# Patient Record
Sex: FEMALE | Race: WHITE | NOT HISPANIC OR LATINO | Employment: FULL TIME | URBAN - METROPOLITAN AREA
[De-identification: names, ages, dates, MRNs, and addresses within clinical notes are randomized per-mention and may not be internally consistent; named-entity substitution may affect disease eponyms.]

---

## 2020-08-07 ENCOUNTER — OFFICE VISIT CONVERTED (OUTPATIENT)
Dept: FAMILY MEDICINE CLINIC | Facility: CLINIC | Age: 40
End: 2020-08-07
Attending: NURSE PRACTITIONER

## 2020-08-07 ENCOUNTER — CONVERSION ENCOUNTER (OUTPATIENT)
Dept: FAMILY MEDICINE CLINIC | Facility: CLINIC | Age: 40
End: 2020-08-07

## 2020-12-02 ENCOUNTER — HOSPITAL ENCOUNTER (OUTPATIENT)
Dept: LAB | Facility: HOSPITAL | Age: 40
Discharge: HOME | End: 2020-12-02
Attending: NURSE PRACTITIONER

## 2020-12-02 LAB
25(OH)D3 SERPL-MCNC: 33.2 NG/ML (ref 30–100)
ALBUMIN SERPL-MCNC: 4.5 G/DL (ref 3.5–5)
ALBUMIN/GLOB SERPL: 1.7 {RATIO} (ref 1.4–2.6)
ALP SERPL-CCNC: 42 U/L (ref 42–98)
ALT SERPL-CCNC: 20 U/L (ref 10–40)
ANION GAP SERPL CALC-SCNC: 19 MMOL/L (ref 8–19)
AST SERPL-CCNC: 16 U/L (ref 15–50)
BASOPHILS # BLD AUTO: 0.02 10*3/UL (ref 0–0.2)
BASOPHILS NFR BLD AUTO: 0.4 % (ref 0–3)
BILIRUB SERPL-MCNC: 0.17 MG/DL (ref 0.2–1.3)
BUN SERPL-MCNC: 19 MG/DL (ref 5–25)
BUN/CREAT SERPL: 23 {RATIO} (ref 6–20)
CALCIUM SERPL-MCNC: 9.7 MG/DL (ref 8.7–10.4)
CHLORIDE SERPL-SCNC: 108 MMOL/L (ref 99–111)
CHOLEST SERPL-MCNC: 235 MG/DL (ref 107–200)
CHOLEST/HDLC SERPL: 3.4 {RATIO} (ref 3–6)
CONV ABS IMM GRAN: 0.01 10*3/UL (ref 0–0.2)
CONV CO2: 20 MMOL/L (ref 22–32)
CONV IMMATURE GRAN: 0.2 % (ref 0–1.8)
CONV TOTAL PROTEIN: 7.2 G/DL (ref 6.3–8.2)
CREAT UR-MCNC: 0.83 MG/DL (ref 0.5–0.9)
DEPRECATED RDW RBC AUTO: 42.5 FL (ref 36.4–46.3)
EOSINOPHIL # BLD AUTO: 0.16 10*3/UL (ref 0–0.7)
EOSINOPHIL # BLD AUTO: 3.3 % (ref 0–7)
ERYTHROCYTE [DISTWIDTH] IN BLOOD BY AUTOMATED COUNT: 12.6 % (ref 11.7–14.4)
EST. AVERAGE GLUCOSE BLD GHB EST-MCNC: 103 MG/DL
GFR SERPLBLD BASED ON 1.73 SQ M-ARVRAT: >60 ML/MIN/{1.73_M2}
GLOBULIN UR ELPH-MCNC: 2.7 G/DL (ref 2–3.5)
GLUCOSE SERPL-MCNC: 94 MG/DL (ref 65–99)
HBA1C MFR BLD: 5.2 % (ref 3.5–5.7)
HCT VFR BLD AUTO: 42.9 % (ref 37–47)
HDLC SERPL-MCNC: 70 MG/DL (ref 40–60)
HGB BLD-MCNC: 14.1 G/DL (ref 12–16)
LDLC SERPL CALC-MCNC: 153 MG/DL (ref 70–100)
LYMPHOCYTES # BLD AUTO: 1.71 10*3/UL (ref 1–5)
LYMPHOCYTES NFR BLD AUTO: 35.6 % (ref 20–45)
MCH RBC QN AUTO: 30.1 PG (ref 27–31)
MCHC RBC AUTO-ENTMCNC: 32.9 G/DL (ref 33–37)
MCV RBC AUTO: 91.7 FL (ref 81–99)
MONOCYTES # BLD AUTO: 0.31 10*3/UL (ref 0.2–1.2)
MONOCYTES NFR BLD AUTO: 6.4 % (ref 3–10)
NEUTROPHILS # BLD AUTO: 2.6 10*3/UL (ref 2–8)
NEUTROPHILS NFR BLD AUTO: 54.1 % (ref 30–85)
NRBC CBCN: 0 % (ref 0–0.7)
OSMOLALITY SERPL CALC.SUM OF ELEC: 298 MOSM/KG (ref 273–304)
PLATELET # BLD AUTO: 188 10*3/UL (ref 130–400)
PMV BLD AUTO: 11.4 FL (ref 9.4–12.3)
POTASSIUM SERPL-SCNC: 3.9 MMOL/L (ref 3.5–5.3)
RBC # BLD AUTO: 4.68 10*6/UL (ref 4.2–5.4)
SODIUM SERPL-SCNC: 143 MMOL/L (ref 135–147)
TRIGL SERPL-MCNC: 59 MG/DL (ref 40–150)
TSH SERPL-ACNC: 2.75 M[IU]/L (ref 0.27–4.2)
VIT B12 SERPL-MCNC: 868 PG/ML (ref 211–911)
VLDLC SERPL-MCNC: 12 MG/DL (ref 5–37)
WBC # BLD AUTO: 4.81 10*3/UL (ref 4.8–10.8)

## 2020-12-04 ENCOUNTER — CONVERSION ENCOUNTER (OUTPATIENT)
Dept: FAMILY MEDICINE CLINIC | Facility: CLINIC | Age: 40
End: 2020-12-04

## 2020-12-04 ENCOUNTER — OFFICE VISIT CONVERTED (OUTPATIENT)
Dept: FAMILY MEDICINE CLINIC | Facility: CLINIC | Age: 40
End: 2020-12-04
Attending: NURSE PRACTITIONER

## 2021-01-07 ENCOUNTER — OFFICE VISIT CONVERTED (OUTPATIENT)
Dept: FAMILY MEDICINE CLINIC | Facility: CLINIC | Age: 41
End: 2021-01-07
Attending: NURSE PRACTITIONER

## 2021-04-01 ENCOUNTER — HOSPITAL ENCOUNTER (OUTPATIENT)
Dept: GENERAL RADIOLOGY | Facility: HOSPITAL | Age: 41
Discharge: HOME | End: 2021-04-01
Attending: NURSE PRACTITIONER

## 2021-04-01 ENCOUNTER — CONVERSION ENCOUNTER (OUTPATIENT)
Dept: FAMILY MEDICINE CLINIC | Facility: CLINIC | Age: 41
End: 2021-04-01

## 2021-04-01 ENCOUNTER — OFFICE VISIT CONVERTED (OUTPATIENT)
Dept: FAMILY MEDICINE CLINIC | Facility: CLINIC | Age: 41
End: 2021-04-01
Attending: NURSE PRACTITIONER

## 2021-05-07 ENCOUNTER — CONVERSION ENCOUNTER (OUTPATIENT)
Dept: FAMILY MEDICINE CLINIC | Facility: CLINIC | Age: 41
End: 2021-05-07

## 2021-05-07 ENCOUNTER — OFFICE VISIT CONVERTED (OUTPATIENT)
Dept: FAMILY MEDICINE CLINIC | Facility: CLINIC | Age: 41
End: 2021-05-07
Attending: NURSE PRACTITIONER

## 2021-05-13 NOTE — PROGRESS NOTES
Progress Note      Patient Name: Angelique Cordon   Patient ID: [id number]   Sex: Female   YOB: 1980    Primary Care Provider: Maira FORBES   Referring Provider: Maira FORBES    Visit Date: August 7, 2020    Provider: ANDREI Evangelista   Location: [facility]   Location Address: [address] [place] [address], [address]  [place]  [id number]   Location Phone: [phone]          Chief Complaint  · NEW PATIENT ESTABLISH CARE      History Of Present Illness  Angelique Cordon is a 40 year old female who presents for evaluation and treatment of:      Patient is here today to establish care.    Says she is here to establish care and discuss her weight gain. Gained 32 lbs since October and seems to be still climbing up. She just restarted [employer]. She is normally strict Keto but recently moved the last couple weeks so she has been off it. She states her body feels better on Keto.    Would like to schedule Pap smear with us and Mammogram. Both have always been normal in the pastl    costochondritis-dx 2018-she takes motrin prn which takes care of it.    due tetanus-defers today.       Past Surgical History  Procedure Name Date Notes   Foot surgery 2004/2008 L FOOT         Medication List  Name Date Started Instructions   Motrin  mg oral tablet 08/09/2020 take 1 tablet (200 mg) by oral route every 4-6 hours as needed with food   Trintellix 10 mg oral tablet 08/10/2020 take 1 tablet (10 mg) by oral route once daily at the same time each day         Allergy List  Allergen Name Date Reaction Notes   NO KNOWN DRUG ALLERGIES --  --  --        Allergies Reconciled  Family Medical History  Disease Name Relative/Age Notes   Diabetes Mother/   --          Social History  Finding Status Start/Stop Quantity Notes   Alcohol Never --/-- --  08/07/2020 -     --  --/-- --  --    Tobacco Never --/-- --  --          Review of Systems  · Constitutional  o Admits  o : wt gain  o Denies  o :  fatigue, night sweats  · Eyes  o Denies  o : double vision, blurred vision  · HENT  o Denies  o : vertigo, recent head injury  · Breasts  o Denies  o : abnormal changes in breast size, additional breast symptoms except as noted in the HPI  · Cardiovascular  o Denies  o : chest pain, irregular heart beats  · Respiratory  o Denies  o : shortness of breath, productive cough  · Gastrointestinal  o Denies  o : nausea, vomiting  · Genitourinary  o Denies  o : dysuria, urinary retention  · Integument  o Denies  o : hair growth change, new skin lesions  · Neurologic  o Denies  o : altered mental status, seizures  · Musculoskeletal  o Admits  o : chest wall pain at times  o Denies  o : joint swelling, limitation of motion  · Endocrine  o Denies  o : cold intolerance, heat intolerance  · Heme-Lymph  o Denies  o : petechiae, lymph node enlargement or tenderness  · Allergic-Immunologic  o Denies  o : frequent illnesses      Vitals  Date Time BP Position Site L\R Cuff Size HR RR TEMP (F) WT  HT  BMI kg/m2 BSA m2 O2 Sat        08/07/2020 01:38 /65 Sitting    88 - R   168lbs 0oz 5'   32.81 1.8 97 %          Physical Examination  · Constitutional  o Appearance  o : alert, in no acute distress, well developed, well-nourished  · Neck  o Thyroid  o : no thyomegaly, no palpabale masses   · Respiratory  o Auscultation of Lungs  o : normal breath sounds throughout, no wheeze, rhonchi, or crackles  · Cardiovascular  o Heart  o : Regular rate and rhythm, Normal S1,S2 , No cardiac murmers, No S3 or S4 gallop or rubs  · Skin and Subcutaneous Tissue  o General Inspection  o : no rashes, normal skin color, warm and dry  o Digits and Nails  o : no clubbing, cyanosis, deformities or edema present, normal appearing nails  · Neurologic  o Mental Status Examination  o : alert and oriented to time, place, and person. Gait and Station: normal gait, able to stand without difficulty  · Psychiatric  o Judgement and Insight  o : judgment and  insight intact  o Mood and Affect  o : normal mood and affect          Assessment  · Fatigue     780.79/R53.83  · Vitamin D deficiency     268.9/E55.9  · Screening for depression     V79.0/Z13.89  scored 4 on the PHQ-9 today-neg for depression.  · Screening for lipid disorders     V77.91/Z13.220  · Visit for screening mammogram     V76.12/Z12.31  ordered today.  · Screening for thyroid disorder     V77.0/Z13.29  · Routine lab draw     V72.60/Z01.89  · Family history of diabetes mellitus     V18.0/Z83.3  · Costochondral chest pain     786.59/R07.89  occ costochondritis-reports motrin relieves the pain.  · Weight gain     783.1/R63.5  32 lb wt gain since October. She just restarted [employer]. She is trying to get back on her Keto diet. She has been on Zoloft-discussed side effect of wt gain. Switched to Trintellix 10mg qd. Scheduled a 1 month f/u.      Plan  · Orders  o ACO-18: Negative screen for clinical depression using a standardized tool () - V79.0/Z13.89 - 08/07/2020  o Screening Mammography; Bilateral 3D (30884, 94470, ) - V76.12/Z12.31 - 08/07/2020  o Hgb A1c [facility] (34763) - V18.0/Z83.3 - 08/07/2020  o Physical, Primary Care Panel (CBC, CMP, Lipid, TSH) [facility] (72156, 36614, 43620, 83921) - V77.91/Z13.220, V77.0/Z13.29, V72.60/Z01.89 - 08/07/2020  o Vitamin D (25-Hydroxy) Level (25159) - 268.9/E55.9, 780.79/R53.83 - 08/07/2020  o ACO-39: Current medications updated and reviewed () - - 08/07/2020  o ACO-14: Influenza immunization was not administered for reasons documented () - - 08/07/2020  o Vitamin B12 level (85091) - 780.79/R53.83 - 08/07/2020  · Medications  o Trintellix 10 mg oral tablet   SIG: take 1 tablet (10 mg) by oral route once daily at the same time each day   DISP: (30) tablets with 0 refills  Prescribed on 08/07/2020     o Zoloft 50 mg oral tablet   SIG: take 1 tablet (50 mg) by oral route once daily   DISP: (0) tablet with 0 refills  Discontinued on 08/07/2020      o Medications have been Reconciled  o Transition of Care or Provider Policy  · Instructions  o Depression Screen completed and scanned into the EMR under the designated folder within the patient's documents.  o Today's PHQ-9 result is 4.  o Take all medications as prescribed/directed.  o Patient was educated/instructed on their diagnosis, treatment and medications prior to discharge from the clinic today.  o Patient instructed to seek medical attention urgently for new or worsening symptoms.  o Call the office with any concerns or questions.  o 1 month follow up  · Disposition  o Call or Return if symptoms worsen or persist.            Electronically Signed by: ANDREI Evangelista -Author on August 13, 2020 11:20:21 PM

## 2021-05-13 NOTE — PROGRESS NOTES
Progress Note      Patient Name: Angelique Cordon   Patient ID: [id number]   Sex: Female   YOB: 1980    Primary Care Provider: Maira FORBES   Referring Provider: Maira FORBES    Visit Date: December 4, 2020    Provider: ANDREI Evangelista   [place] [facility]   Location Address: [address] [place] [address], [address]  [place], [place]  [id number]   Location Phone: [phone]          Chief Complaint  · DISCUSS MEDICATIONS      History Of Present Illness  Angelique Cordon is a 40 year old /White female who presents for evaluation and treatment of:      Patient is here today to discuss medications. Says Trintellix is working for her she does feel a little agitated at night time. Increased dose to 20mg qd. Scheduled 1 month follow up.    States she would like to discuss weight loss options. Says she is on dirty keto diet and exercises 3-4 times a week but don't seem to lose any weight. Discussed Saxenda, Contrave and Phentermine. She is not a candidate for Contrave or Phentermine due to interaction w/Trintellix. Prescribed Saxenda. She is to bring the medication back for teaching prior to starting the medication.    report whelp under the left axilla -reports initially she noticed it in the summer but it went away-states it came back in the same spot. She was told it was a bug bite when it initially occurred. The area is red but no discharge or warmth noted. She is to call if the area does not resolve or she notices more lesions.       Past Medical History  Disease Name Date Onset Notes   Pap smear for cervical cancer screening 2018 --    Screening Mammogram --  --          Past Surgical History  Procedure Name Date Notes   Foot surgery 2004/2008 L FOOT         Medication List  Name Date Started Instructions   ibuprofen 800 mg oral tablet 12/04/2020 take 1 tablet (800 mg) by oral route 3 times per day with food prn   Trintellix 20 mg oral tablet 12/04/2020 take 1 tablet  (20 mg) by oral route once daily at the same time each day         Allergy List  Allergen Name Date Reaction Notes   NO KNOWN DRUG ALLERGIES --  --  --        Allergies Reconciled  Family Medical History  Disease Name Relative/Age Notes   Diabetes Mother/   --          Social History  Finding Status Start/Stop Quantity Notes   Alcohol Never --/-- --  12/04/2020 - 08/07/2020 -     --  --/-- --  --    Tobacco Never --/-- --  --          Review of Systems  · Constitutional  o Admits  o : wt gain  o Denies  o : fever, fatigue, weight loss  · Cardiovascular  o Denies  o : lower extremity edema, claudication, chest pressure, palpitations  · Respiratory  o Denies  o : shortness of breath, wheezing, cough, hemoptysis, dyspnea on exertion  · Gastrointestinal  o Denies  o : nausea, vomiting, diarrhea, constipation, abdominal pain  · Integument  o Admits  o : pigmentation changes, new skin lesions  o Denies  o : rash, itching  · Psychiatric  o Admits  o : anxiety, depression, agitation      Vitals  Date Time BP Position Site L\R Cuff Size HR RR TEMP (F) WT  HT  BMI kg/m2 BSA m2 O2 Sat FR L/min FiO2 HC       08/07/2020 01:38 /65 Sitting    88 - R   168lbs 0oz 5'   32.81 1.8 97 %      12/04/2020 08:11 /77 Sitting    87 - R   175lbs 4oz 5'   34.23 1.83 100 %            Physical Examination  · Constitutional  o Appearance  o : alert, in no acute distress, well developed, well-nourished  · Neck  o Thyroid  o : no thyomegaly, no palpabale masses   · Respiratory  o Auscultation of Lungs  o : normal breath sounds throughout, no wheeze, rhonchi, or crackles  · Cardiovascular  o Heart  o : Regular rate and rhythm, Normal S1,S2 , No cardiac murmers, No S3 or S4 gallop or rubs  · Skin and Subcutaneous Tissue  o General Inspection  o : no rashes, normal skin color, warm and dry  o Digits and Nails  o : no clubbing, cyanosis, deformities or edema present, normal appearing nails  · Neurologic  o Mental Status  Examination  o : alert and oriented to time, place, and person. Gait and Station: normal gait, able to stand without difficulty  · Psychiatric  o Judgement and Insight  o : judgment and insight intact  o Mood and Affect  o : normal mood and affect          Assessment  · Anxiety disorder     300.00/F41.9  · Depression     311/F32.9  · Obesity     278.00/E66.9  · Weight gain     783.1/R63.5  · Skin lesion     709.9/L98.9  · BMI 34.0-34.9,adult     V85.34/Z68.34      Plan  · Orders  o ACO-39: Current medications updated and reviewed (, 1159F) - - 12/04/2020  o ACO-14: Influenza immunization was not administered for reasons documented [facility] () - - 12/04/2020  · Medications  o Saxenda 3 mg/0.5 mL (18 mg/3 mL) subcutaneous pen injector   SIG: inject 0.6 mg subq daily x1wk then increase to 1.2mg qd x 1wk then 1.8mg qd x 1 wk then 2.4mg qd x 1wk then 3mg qd.   DISP: (1) Pen Needle with 0 refills  Prescribed on 12/04/2020     o ibuprofen 800 mg oral tablet   SIG: take 1 tablet (800 mg) by oral route 3 times per day with food prn   DISP: (20) Tablet with 2 refills  Adjusted on 12/04/2020     o Trintellix 20 mg oral tablet   SIG: take 1 tablet (20 mg) by oral route once daily at the same time each day   DISP: (30) Tablet with 0 refills  Refilled on 12/04/2020     o Medications have been Reconciled  o Transition of Care or Provider Policy  · Instructions  o Take all medications as prescribed/directed.  o Patient was educated/instructed on their diagnosis, treatment and medications prior to discharge from the clinic today.  o Patient instructed to seek medical attention urgently for new or worsening symptoms.  o Call the office with any concerns or questions.  o 1 month follow up  o Electronically Identified Patient Education Materials Provided Electronically  · Disposition  o Call or Return if symptoms worsen or persist.            Electronically Signed by: ANDREI Evangelista -Author on December 4, 2020 09:48:23 AM

## 2021-05-14 VITALS
SYSTOLIC BLOOD PRESSURE: 133 MMHG | HEART RATE: 87 BPM | OXYGEN SATURATION: 100 % | DIASTOLIC BLOOD PRESSURE: 77 MMHG | HEIGHT: 60 IN | BODY MASS INDEX: 34.41 KG/M2 | WEIGHT: 175.25 LBS

## 2021-05-14 VITALS
HEIGHT: 60 IN | SYSTOLIC BLOOD PRESSURE: 121 MMHG | HEART RATE: 78 BPM | DIASTOLIC BLOOD PRESSURE: 74 MMHG | WEIGHT: 177 LBS | OXYGEN SATURATION: 98 % | BODY MASS INDEX: 34.75 KG/M2

## 2021-05-14 VITALS
HEIGHT: 60 IN | WEIGHT: 178.25 LBS | SYSTOLIC BLOOD PRESSURE: 124 MMHG | BODY MASS INDEX: 34.99 KG/M2 | DIASTOLIC BLOOD PRESSURE: 79 MMHG | OXYGEN SATURATION: 97 % | SYSTOLIC BLOOD PRESSURE: 148 MMHG | HEART RATE: 84 BPM | DIASTOLIC BLOOD PRESSURE: 70 MMHG

## 2021-05-14 NOTE — PROGRESS NOTES
Progress Note      Patient Name: Angelique Cordon   Patient ID: [id number]   Sex: Female   YOB: 1980    Primary Care Provider: Maira FORBES   Referring Provider: Maira FORBES    Visit Date: April 1, 2021    Provider: ANDREI Evangelista   Location: [facility] [place]   Location Address: [address] [place] [address], [address]  [place], [place]  [id number]   Location Phone: [phone]          Chief Complaint  · Acute Visit   · Right Foot pain   · Cramping in toes      History Of Present Illness  Angelique Cordon is a 41 year old /White female who presents for evaluation and treatment of:      Pt is here for Acute Visit.    Pt states that this pain has been going on for a month now. Pt thinks that she injured it in [place] at the [employer]. States she did 2 races the first was a 7 mile and the second was a 4 mile.     Pt states that she has pain on the bottom of her right forefoot that radiates to her toes with sharp tightening pain. Pain isolated in the second and third toes. States she is unable to go barefoot due to pain. Denies swelling or bruising. Pt has used IBU 800mg QD for pain. States it helps the pain. She has not tried ice. Discussed her symptoms correlate with metatarsalgia. Ordered right foot x-ray to r/o stress fx.      She is doing CrossFit 5 days per wk and meal planning to loose wt-she was started on phentermine in Jan but missed her last appt due to having to pick her daughter up from school. Denies any side effects of the phentermine-increased dose to 18.75mg bid. Tani and JUANJO up to date.       Past Medical History  Disease Name Date Onset Notes   Bone Density Screening --  --    Pap smear for cervical cancer screening 2018 --    Screening Mammogram --  --          Past Surgical History  Procedure Name Date Notes   Colonoscopy --  --    Foot surgery 2004/2008 L FOOT         Medication List  Name Date Started Instructions   ibuprofen 800 mg oral tablet  02/18/2021 take 1 tablet (800 mg) by oral route 3 times per day with food prn   phentermine 37.5 mg oral tablet 04/01/2021 take one-half tablet (18.75 mg) by oral route 2 times per day before breakfast and after lunch   Trintellix 20 mg oral tablet 02/18/2021 take 1 tablet (20 mg) by oral route once daily at the same time each day         Allergy List  Allergen Name Date Reaction Notes   NO KNOWN DRUG ALLERGIES --  --  --          Family Medical History  Disease Name Relative/Age Notes   Diabetes Mother/   --          Social History  Finding Status Start/Stop Quantity Notes   Alcohol Never --/-- --  01/07/2021 - 12/04/2020 - 08/07/2020 -     --  --/-- --  --    Tobacco Never --/-- --  --          Review of Systems  · Constitutional  o Denies  o : fever, fatigue, weight loss, weight gain  · Cardiovascular  o Denies  o : lower extremity edema, claudication, chest pressure, palpitations  · Respiratory  o Denies  o : shortness of breath, wheezing, cough, hemoptysis, dyspnea on exertion  · Gastrointestinal  o Denies  o : nausea, vomiting, diarrhea, constipation, abdominal pain  · Musculoskeletal  o Admits  o : foot pain      Vitals  Date Time BP Position Site L\R Cuff Size HR RR TEMP (F) WT  HT  BMI kg/m2 BSA m2 O2 Sat FR L/min FiO2 HC       04/01/2021 02:55 /79 Sitting    84 - R   178lbs 4oz 5'   34.81 1.85 97 %  21%    04/01/2021 03:12 /70 Sitting                       Physical Examination  · Constitutional  o Appearance  o : alert, in no acute distress, well developed, well-nourished  · Respiratory  o Auscultation of Lungs  o : normal breath sounds throughout, no wheeze, rhonchi, or crackles  · Cardiovascular  o Heart  o : Regular rate and rhythm, Normal S1,S2 , No cardiac murmers, No S3 or S4 gallop or rubs  · Skin and Subcutaneous Tissue  o General Inspection  o : no rashes, normal skin color, warm and dry  o Digits and Nails  o : no clubbing, cyanosis, deformities or edema present, normal  appearing nails  · Neurologic  o Mental Status Examination  o : alert and oriented to time, place, and person. Gait and Station: normal gait, able to stand without difficulty  · Psychiatric  o Judgment and Insight  o : judgment and insight intact  o Mood and Affect  o : normal mood and affect     TTP over right forefoot-specifically below the 2nd and 3rd digits           Assessment  · Foot pain, right     729.5/M79.671  · Metatarsalgia of right foot     726.70/M77.41      Plan  · Orders  o ACO - Pt declines to or was not able to provide an Advance Care Plan or name a Surrogate Decision Maker (1124F) - - 04/01/2021  o ACO-39: Current medications updated and reviewed (1159F, ) - - 04/01/2021  o Xray foot right [facility] ([id number]) - 729.5/M79.671 - 04/01/2021  · Medications  o phentermine 37.5 mg oral tablet   SIG: take one-half tablet (18.75 mg) by oral route 2 times per day before breakfast and after lunch   DISP: (30) Tablet with 0 refills  Adjusted on 04/01/2021     o Medications have been Reconciled  o Transition of Care or Provider Policy  · Instructions  o Patient was educated/instructed on their diagnosis, treatment and medications prior to discharge from the clinic today.  o Patient instructed to seek medical attention urgently for new or worsening symptoms.  o Call the office with any concerns or questions.  o Bring all medicines with their bottles to each office visit.  o 1 month follow up  · Disposition  o Call or Return if symptoms worsen or persist.            Electronically Signed by: ANDREI Evangelista -Author on April 1, 2021 09:20:04 PM

## 2021-05-14 NOTE — PROGRESS NOTES
Progress Note      Patient Name: Angelique Cordon   Patient ID: [id number]   Sex: Female   YOB: 1980    Primary Care Provider: Maira FORBES   Referring Provider: Maira FORBES    Visit Date: January 7, 2021    Provider: ANDREI Evangelista   Location: [facility]   Location Address: [address] [place] [address], [address]  [place], [place]  [id number]   Location Phone: [phone]          Chief Complaint  · 1 MONTH F/U      History Of Present Illness  Angelique Cordon is a 40 year old /White female who presents for evaluation and treatment of:      Patient is here today for 1 month follow up on Saxenda. States her insurance wont pay for it unless she try phentermine and contrave medications. States she doing really good on Trintellix 20mg-she feels her mood is great. She is doing Keto diet and she has an appt today to start CrossFit. There is a possible interaction with Trintellix w/Saxenda and Contrave so instructed pt to move her Trintellix to pm and take the Phentermine in the am to avoid a medication interaction. UDS, Tani and Controlled Consent obtained today. Prescribed Phentermine 15mg po qd. Scheduled 1m follow up.       Past Medical History  Disease Name Date Onset Notes   Pap smear for cervical cancer screening 2018 --    Screening Mammogram --  --          Past Surgical History  Procedure Name Date Notes   Foot surgery 2004/2008 L FOOT         Medication List  Name Date Started Instructions   ibuprofen 800 mg oral tablet 12/04/2020 take 1 tablet (800 mg) by oral route 3 times per day with food prn   Trintellix 20 mg oral tablet 01/07/2021 take 1 tablet (20 mg) by oral route once daily at the same time each day         Allergy List  Allergen Name Date Reaction Notes   NO KNOWN DRUG ALLERGIES --  --  --        Allergies Reconciled  Family Medical History  Disease Name Relative/Age Notes   Diabetes Mother/   --          Social History  Finding Status Start/Stop  Quantity Notes   Alcohol Never --/-- --  01/07/2021 - 12/04/2020 - 08/07/2020 -     --  --/-- --  --    Tobacco Never --/-- --  --          Review of Systems  · Constitutional  o Admits  o : wt gain  o Denies  o : fever, fatigue, weight loss  · Cardiovascular  o Denies  o : lower extremity edema, claudication, chest pressure, palpitations  · Respiratory  o Denies  o : shortness of breath, wheezing, cough, hemoptysis, dyspnea on exertion  · Gastrointestinal  o Denies  o : nausea, vomiting, diarrhea, constipation, abdominal pain      Vitals  Date Time BP Position Site L\R Cuff Size HR RR TEMP (F) WT  HT  BMI kg/m2 BSA m2 O2 Sat FR L/min FiO2 HC       01/07/2021 08:21 /74 Sitting    78 - R   176lbs 16oz 5'   34.57 1.84 98 %            Physical Examination  · Constitutional  o Appearance  o : alert, in no acute distress, well developed, well-nourished  · Respiratory  o Auscultation of Lungs  o : normal breath sounds throughout, no wheeze, rhonchi, or crackles  · Cardiovascular  o Heart  o : Regular rate and rhythm, Normal S1,S2 , No cardiac murmers, No S3 or S4 gallop or rubs  · Skin and Subcutaneous Tissue  o General Inspection  o : no rashes, normal skin color, warm and dry  o Digits and Nails  o : no clubbing, cyanosis, deformities or edema present, normal appearing nails  · Neurologic  o Mental Status Examination  o : alert and oriented to time, place, and person. Gait and Station: normal gait, able to stand without difficulty  · Psychiatric  o Judgement and Insight  o : judgment and insight intact  o Mood and Affect  o : normal mood and affect          Results  · In-Office Procedures  o Lab procedure  § IOP - Urine Drug Screen In-House [facility] ([address])   § Amphetamines Ur Ql: Negative   § Barbiturates Ur Ql: Negative   § Buprenorphine+Nor Ur Ql Scn: Negative   § Benzodiaz Ur Ql: Negative   § Cocaine Ur Ql: Negative   § Methadone Ur Ql: Negative   § Methamphet Ur Ql: Negative   § MDMA Ur Ql Scn: Negative    § Opiates Ur Ql: Negative   § Oxycodone Ur Ql: Negative   § PCP Ur Ql: Negative   § THC Ur Ql: Negative   § Temp in Range?: Within/Acceptable   § Control Seen?: Yes   § Automated Dipstick Urinalysis (Surg Spec) WITHOUT Micro [facility] (42251)   § Color Ur: Yellow   § Clarity Ur: Clear   § Glucose Ur Ql Strip: Negative   § Bilirub Ur Ql Strip: Negative   § Ketones Ur Ql Strip: Negative   § Sp Gr Ur Qn: 1.020   § Hgb Ur Ql Strip: Negative   § pH Ur-LsCnc: 6.5   § Prot Ur Ql Strip: Negative   § Urobilinogen Ur Strip-mCnc: 0.2 E.U./dL   § Nitrite Ur Ql Strip: Negative   § WBC Est Ur Ql Strip: Negative       Assessment  · Obesity     278.00/E66.9  · BMI 34.0-34.9,adult     V85.34/Z68.34      Plan  · Orders  o MARCO A Report (KASPR) - - 01/07/2021  o ACO-14: Influenza immunization was not administered for reasons documented [facility] () - - 01/07/2021  o ACO-39: Current medications updated and reviewed (, 1159F) - - 01/07/2021  · Medications  o phentermine 15 mg oral capsule   SIG: take 1 capsule (15 mg) by oral route once daily before breakfast   DISP: (30) Capsule with 0 refills  Prescribed on 01/07/2021     o Trintellix 20 mg oral tablet   SIG: take 1 tablet (20 mg) by oral route once daily at the same time each day   DISP: (30) Tablet with 5 refills  Adjusted on 01/07/2021     o Saxenda 3 mg/0.5 mL (18 mg/3 mL) subcutaneous pen injector   SIG: inject 0.6 mg subq daily x1wk then increase to 1.2mg qd x 1wk then 1.8mg qd x 1 wk then 2.4mg qd x 1wk then 3mg qd.   DISP: (1) Pen Needle with 0 refills  Discontinued on 01/07/2021     o Medications have been Reconciled  o Transition of Care or Provider Policy  · Instructions  o Take all medications as prescribed/directed.  o Patient was educated/instructed on their diagnosis, treatment and medications prior to discharge from the clinic today.  o Patient instructed to seek medical attention urgently for new or worsening symptoms.  o Call the office with any concerns or  questions.  o 1 month follow up  o Electronically Identified Patient Education Materials Provided Electronically  · Disposition  o Call or Return if symptoms worsen or persist.            Electronically Signed by: ANDREI Evangelista -Author on January 7, 2021 10:01:27 AM

## 2021-05-15 VITALS
SYSTOLIC BLOOD PRESSURE: 117 MMHG | OXYGEN SATURATION: 97 % | BODY MASS INDEX: 32.98 KG/M2 | HEART RATE: 88 BPM | WEIGHT: 168 LBS | DIASTOLIC BLOOD PRESSURE: 65 MMHG | HEIGHT: 60 IN

## 2021-06-06 NOTE — PROGRESS NOTES
Progress Note      Patient Name: Angelique Cordon   Patient ID: [id number]   Sex: Female   YOB: 1980    Primary Care Provider: Maira FORBES   Referring Provider: Maira FORBES    Visit Date: May 7, 2021    Provider: ANDREI Evangelista   Location: [facility] [place] [place]   Location Address: [address] [place] [address], [address]  [place], [place]  [id number]   Location Phone: [phone]          Chief Complaint  · FOLLOW UP WEIGHT LOSS      History Of Present Illness  Angelique Cordon is a 41 year old /White female who presents for evaluation and treatment of:      PATIENT HERE FOR FOLLOW UP WEIGHT LOSS--she has lost 11 lbs since last visit. Denies SOA, HTN, tachycardia or palpitions -states the only thing she has noticed is occ she has to take a deep breath. Her vital signs are WNL in the office today. She is doing CrossFit and HSN. States HSN tell you what to eat to be healthier. Her goal is to loose 30 more pounds and she will be at her BMI. Instructed to continue diet and exercise to prevent rebound wt gain. She is to call with any questions or concerns. Tani ordered, uds up to date. Refilled today.    due pap-states she has started noticing a vaginal odor-denies fish odor-denies new partner-states she has started eating more veg and thinks it may be related. Denies urinary symptoms. Scheduled pap-discussed doing vaginal cultures w/pap.    she would like a referral to podiatry due to feet pain-referral placed.       Past Medical History  Disease Name Date Onset Notes   Bone Density Screening --  --    Pap smear for cervical cancer screening 2018 --    Screening Mammogram --  --          Past Surgical History  Procedure Name Date Notes   Colonoscopy --  --    Foot surgery 2004/2008 L FOOT         Medication List  Name Date Started Instructions   ibuprofen 800 mg oral tablet 02/18/2021 take 1 tablet (800 mg) by oral route 3 times per day with food prn   phentermine 37.5 mg oral  tablet 04/01/2021 take one-half tablet (18.75 mg) by oral route 2 times per day before breakfast and after lunch   Trintellix 20 mg oral tablet 02/18/2021 take 1 tablet (20 mg) by oral route once daily at the same time each day         Allergy List  Allergen Name Date Reaction Notes   NO KNOWN DRUG ALLERGIES --  --  --          Family Medical History  Disease Name Relative/Age Notes   Diabetes Mother/   --          Social History  Finding Status Start/Stop Quantity Notes   Alcohol Never --/-- --  01/07/2021 - 12/04/2020 - 08/07/2020 -     --  --/-- --  --    Tobacco Never --/-- --  --          Review of Systems  · Constitutional  o Admits  o : wt loss  o Denies  o : fever, fatigue, weight gain  · Cardiovascular  o Denies  o : lower extremity edema, claudication, chest pressure, palpitations  · Respiratory  o Denies  o : shortness of breath, wheezing, cough, hemoptysis, dyspnea on exertion  · Gastrointestinal  o Denies  o : nausea, vomiting, diarrhea, constipation, abdominal pain      Vitals  Date Time BP Position Site L\R Cuff Size HR RR TEMP (F) WT  HT  BMI kg/m2 BSA m2 O2 Sat FR L/min FiO2 HC       01/07/2021 08:21 /74 Sitting    78 - R   176lbs 16oz 5'   34.57 1.84 98 %      04/01/2021 02:55 /79 Sitting    84 - R   178lbs 4oz 5'   34.81 1.85 97 %  21%    05/07/2021 10:03 /82 Sitting    86 - R 12 97.8 167lbs 2oz 5'   32.64 1.79 99 %  21%          Physical Examination  · Constitutional  o Appearance  o : alert, in no acute distress, well developed, well-nourished  · Neck  o Thyroid  o : no thyomegaly, no palpabale masses   · Respiratory  o Auscultation of Lungs  o : normal breath sounds throughout, no wheeze, rhonchi, or crackles  · Cardiovascular  o Heart  o : Regular rate and rhythm, Normal S1,S2 , No cardiac murmers, No S3 or S4 gallop or rubs  · Skin and Subcutaneous Tissue  o General Inspection  o : no rashes, normal skin color, warm and dry  o Digits and Nails  o : no clubbing,  cyanosis, deformities or edema present, normal appearing nails  · Neurologic  o Mental Status Examination  o : alert and oriented to time, place, and person. Gait and Station: normal gait, able to stand without difficulty  · Psychiatric  o Judgement and Insight  o : judgment and insight intact  o Mood and Affect  o : normal mood and affect          Assessment  · Obesity     278.00/E66.9  · Foot pain, bilateral       Pain in right foot     729.5/M79.671  Pain in left foot     729.5/M79.672  · BMI 32.0-32.9,adult     V85.32/Z68.32  · Vaginal odor     625.8/N89.8      Plan  · Orders  o MARCO A Report (KASPR) - - 05/07/2021  o ACO-14: Influenza immunization was not administered for reasons documented [facility] () - - 05/07/2021  o ACO-39: Current medications updated and reviewed (, 1159F) - - 05/07/2021  o PODIATRY CONSULTATION (PODIA) - 729.5/M79.671, 729.5/M79.672 - 05/07/2021  · Medications  o phentermine 37.5 mg oral tablet   SIG: take one-half tablet (18.75 mg) by oral route 2 times per day before breakfast and after lunch   DISP: (30) Tablet with 0 refills  Refilled on 05/07/2021     o Medications have been Reconciled  o Transition of Care or Provider Policy  · Instructions  o Patient was educated/instructed on their diagnosis, treatment and medications prior to discharge from the clinic today.  o Patient instructed to seek medical attention urgently for new or worsening symptoms.  o Call the office with any concerns or questions.  o 1 month follow up w/pap  · Disposition  o Call or Return if symptoms worsen or persist.            Electronically Signed by: ANDREI Evangelista -Author on May 7, 2021 02:24:18 PM

## 2021-06-17 ENCOUNTER — PROCEDURE VISIT (OUTPATIENT)
Dept: FAMILY MEDICINE CLINIC | Facility: CLINIC | Age: 41
End: 2021-06-17

## 2021-06-17 VITALS
BODY MASS INDEX: 32.16 KG/M2 | TEMPERATURE: 97.2 F | DIASTOLIC BLOOD PRESSURE: 66 MMHG | HEART RATE: 88 BPM | SYSTOLIC BLOOD PRESSURE: 133 MMHG | OXYGEN SATURATION: 100 % | HEIGHT: 60 IN | WEIGHT: 163.8 LBS

## 2021-06-17 DIAGNOSIS — Z12.4 PAP SMEAR FOR CERVICAL CANCER SCREENING: ICD-10-CM

## 2021-06-17 DIAGNOSIS — R31.9 HEMATURIA, UNSPECIFIED TYPE: Primary | ICD-10-CM

## 2021-06-17 DIAGNOSIS — E66.3 OVERWEIGHT: ICD-10-CM

## 2021-06-17 PROBLEM — F32.A DEPRESSION: Status: ACTIVE | Noted: 2021-06-17

## 2021-06-17 LAB
BACTERIA UR QL AUTO: NORMAL /HPF
BILIRUB BLD-MCNC: NEGATIVE MG/DL
BILIRUB UR QL STRIP: NEGATIVE
CANDIDA SPECIES: NEGATIVE
CLARITY UR: CLEAR
CLARITY, POC: CLEAR
COLOR UR: YELLOW
COLOR UR: YELLOW
GARDNERELLA VAGINALIS: NEGATIVE
GLUCOSE UR STRIP-MCNC: NEGATIVE MG/DL
GLUCOSE UR STRIP-MCNC: NEGATIVE MG/DL
HGB UR QL STRIP.AUTO: ABNORMAL
HYALINE CASTS UR QL AUTO: NORMAL /LPF
KETONES UR QL STRIP: NEGATIVE
KETONES UR QL: NEGATIVE
LEUKOCYTE EST, POC: NEGATIVE
LEUKOCYTE ESTERASE UR QL STRIP.AUTO: NEGATIVE
NITRITE UR QL STRIP: NEGATIVE
NITRITE UR-MCNC: NEGATIVE MG/ML
PH UR STRIP.AUTO: 6 [PH] (ref 5–8)
PH UR: 5.5 [PH] (ref 5–8)
PROT UR QL STRIP: NEGATIVE
PROT UR STRIP-MCNC: NEGATIVE MG/DL
RBC # UR STRIP: ABNORMAL /UL
RBC # UR: NORMAL /HPF
REF LAB TEST METHOD: NORMAL
SP GR UR STRIP: 1.02 (ref 1–1.03)
SP GR UR: 1.01 (ref 1–1.03)
SQUAMOUS #/AREA URNS HPF: NORMAL /HPF
T VAGINALIS DNA VAG QL PROBE+SIG AMP: NEGATIVE
UROBILINOGEN UR QL STRIP: ABNORMAL
UROBILINOGEN UR QL: NORMAL
WBC UR QL AUTO: NORMAL /HPF

## 2021-06-17 PROCEDURE — 87624 HPV HI-RISK TYP POOLED RSLT: CPT | Performed by: NURSE PRACTITIONER

## 2021-06-17 PROCEDURE — 87660 TRICHOMONAS VAGIN DIR PROBE: CPT | Performed by: NURSE PRACTITIONER

## 2021-06-17 PROCEDURE — 87205 SMEAR GRAM STAIN: CPT | Performed by: NURSE PRACTITIONER

## 2021-06-17 PROCEDURE — 81003 URINALYSIS AUTO W/O SCOPE: CPT | Performed by: NURSE PRACTITIONER

## 2021-06-17 PROCEDURE — 87186 SC STD MICRODIL/AGAR DIL: CPT | Performed by: NURSE PRACTITIONER

## 2021-06-17 PROCEDURE — 87070 CULTURE OTHR SPECIMN AEROBIC: CPT | Performed by: NURSE PRACTITIONER

## 2021-06-17 PROCEDURE — G0123 SCREEN CERV/VAG THIN LAYER: HCPCS | Performed by: NURSE PRACTITIONER

## 2021-06-17 PROCEDURE — 99396 PREV VISIT EST AGE 40-64: CPT | Performed by: NURSE PRACTITIONER

## 2021-06-17 PROCEDURE — 81001 URINALYSIS AUTO W/SCOPE: CPT | Performed by: NURSE PRACTITIONER

## 2021-06-17 PROCEDURE — 87510 GARDNER VAG DNA DIR PROBE: CPT | Performed by: NURSE PRACTITIONER

## 2021-06-17 PROCEDURE — 87480 CANDIDA DNA DIR PROBE: CPT | Performed by: NURSE PRACTITIONER

## 2021-06-17 RX ORDER — PHENTERMINE HYDROCHLORIDE 37.5 MG/1
37.5 TABLET ORAL
Qty: 30 TABLET | Refills: 0 | Status: SHIPPED | OUTPATIENT
Start: 2021-06-17 | End: 2021-08-10 | Stop reason: SDUPTHER

## 2021-06-17 RX ORDER — IBUPROFEN 800 MG/1
TABLET ORAL
COMMUNITY
Start: 2021-06-10 | End: 2021-08-26 | Stop reason: SDUPTHER

## 2021-06-17 RX ORDER — VORTIOXETINE 20 MG/1
20 TABLET, FILM COATED ORAL EVERY MORNING
COMMUNITY
Start: 2021-06-10 | End: 2022-11-04

## 2021-06-17 RX ORDER — PHENTERMINE HYDROCHLORIDE 37.5 MG/1
TABLET ORAL
COMMUNITY
Start: 2021-05-07 | End: 2021-06-17 | Stop reason: SDUPTHER

## 2021-06-17 NOTE — ADDENDUM NOTE
Addended by: HELENA HERNANDEZ on: 6/17/2021 11:42 AM     Modules accepted: Level of Service, SmartSet

## 2021-06-17 NOTE — PROGRESS NOTES
Subjective   Pt states that she thinks that foul odor and white discharge came when she started her Menstrual cycle. Pt denies any burning or itching. Pt would like to see about getting tested for this if possible.     Pt needs refill on Phentermine, current weight 163.8. Wt loss of 14lbs.  Denies palpitations, soa or htn.  Refilled today. Discussed establishing a routine to establish good eating habits and exercise to prevent rebound wt gain.     Angelique Cordon is a 41 y.o. female who presents for annual exam.    Obstetric History:  OB History    No obstetric history on file.        Menstrual History:  Menarche Age: 12 years  Patient's last menstrual period was 05/27/2021 (exact date).  Period Duration (Days): 5-7  Period Pattern: (!) Irregular (Irregular d/t not know when she going to start)  Menstrual Flow: Heavy (Starts Heavy)  Menstrual Control: Tampon  Menstrual Control Change Freq (Hours): less than 1 hour when flow starts then up to 3 hrs  Dysmenorrhea: None  Cyclic Symptoms: (!) Yes  Cyclical Symptoms: Other (Comment), Bloating (Back pain and Cramping)    Sexual History:  Age of First Sexual Encounter: 18 years  Number of Partners in Last Year: 1 years (last 15 years)  Sexually Transmitted Infection History: None  Results of Last Sexually Transmitted Infection Testing: Not applicable      Current contraception: vasectomy  History of abnormal Pap smear: no  JUAQUIN exposure in utero: no  Received Gardasil immunization: no  Perform regular self breast exam: no  Family history of uterine or ovarian cancer: no  Family History of cervical cancer: no  Family History of colon cancer/colon polyps: yes - dad  Regular self breast exam: no  History of abnormal mammogram: no  Family history of breast cancer: no  History of abnormal lipids: no    The following portions of the patient's history were reviewed and updated as appropriate:   She  has a past medical history of Abnormal bone density screening, Pap smear for  "cervical cancer screening (2018), and Visit for screening mammogram.  She does not have any pertinent problems on file.  She  has a past surgical history that includes Foot surgery (2003/2009).  Her family history includes COPD in her mother; Diabetes in her mother; Hypertension in her mother; Sleep apnea in her mother.  She  reports that she has never smoked. She has never used smokeless tobacco. She reports that she does not drink alcohol and does not use drugs.  Current Outpatient Medications   Medication Sig Dispense Refill   • ibuprofen (ADVIL,MOTRIN) 800 MG tablet      • phentermine (ADIPEX-P) 37.5 MG tablet Take 1 tablet by mouth Every Morning Before Breakfast. 30 tablet 0   • Trintellix 20 MG tablet        No current facility-administered medications for this visit.     Current Outpatient Medications on File Prior to Visit   Medication Sig   • ibuprofen (ADVIL,MOTRIN) 800 MG tablet    • Trintellix 20 MG tablet    • [DISCONTINUED] phentermine (ADIPEX-P) 37.5 MG tablet      No current facility-administered medications on file prior to visit.     She has No Known Allergies..    Review of Systems    Genitourinary:positive for abnormal menstrual periods and vaginal discharge  GYN:  positive for  vaginal discharge     Objective   Physical Exam    /66 (BP Location: Right arm, Patient Position: Sitting, Cuff Size: Adult)   Pulse 88   Temp 97.2 °F (36.2 °C)   Ht 152.4 cm (60\")   Wt 74.3 kg (163 lb 12.8 oz)   LMP 05/27/2021 (Exact Date)   SpO2 100%   Breastfeeding No   BMI 31.99 kg/m²     General:   alert, appears stated age and cooperative   Heart: regular rate and rhythm, S1, S2 normal, no murmur, click, rub or gallop   Lungs: clear to auscultation bilaterally   Abdomen: soft, non-tender, without masses or organomegaly   Breast: inspection negative, no nipple discharge or bleeding, no masses or nodularity palpable   Vulva: normal   Vagina: moderate discharge   Cervix: no bleeding following Pap, no " cervical motion tenderness and no lesions   Uterus: non-tender, normal shape and consistency   Adnexa: normal adnexa     Assessment/Plan   Diagnoses and all orders for this visit:    1. Pap smear for cervical cancer screening (Primary)  -     PAP, Liquid Based ([facility] Only); Future  -     POCT urinalysis dipstick, automated    2. Overweight  -     phentermine (ADIPEX-P) 37.5 MG tablet; Take 1 tablet by mouth Every Morning Before Breakfast.  Dispense: 30 tablet; Refill: 0        urinalysis with micro and repeat u/a in 2 wks

## 2021-06-18 ENCOUNTER — TELEPHONE (OUTPATIENT)
Dept: FAMILY MEDICINE CLINIC | Facility: CLINIC | Age: 41
End: 2021-06-18

## 2021-06-18 DIAGNOSIS — Z12.4 PAP SMEAR FOR CERVICAL CANCER SCREENING: Primary | ICD-10-CM

## 2021-06-18 NOTE — TELEPHONE ENCOUNTER
Pt informed    ----- Message from ANDREI Bustos sent at 6/17/2021 11:22 PM EDT -----  Urine micro wnl-would still like her to come by in 2 wks to repeat u/a

## 2021-06-20 LAB
BACTERIA SPEC AEROBE CULT: ABNORMAL
GRAM STN SPEC: ABNORMAL
GRAM STN SPEC: ABNORMAL

## 2021-06-21 LAB
CYTOLOGIST CVX/VAG CYTO: NORMAL
CYTOLOGY CVX/VAG DOC CYTO: NORMAL
DX ICD CODE: NORMAL
HIV 1 & 2 AB SER-IMP: NORMAL
OTHER STN SPEC: NORMAL
STAT OF ADQ CVX/VAG CYTO-IMP: NORMAL

## 2021-06-21 RX ORDER — SULFAMETHOXAZOLE AND TRIMETHOPRIM 800; 160 MG/1; MG/1
1 TABLET ORAL 2 TIMES DAILY
Qty: 20 TABLET | Refills: 0 | Status: SHIPPED | OUTPATIENT
Start: 2021-06-21 | End: 2021-07-01

## 2021-07-15 VITALS
WEIGHT: 167.12 LBS | HEART RATE: 86 BPM | DIASTOLIC BLOOD PRESSURE: 82 MMHG | BODY MASS INDEX: 32.81 KG/M2 | OXYGEN SATURATION: 99 % | SYSTOLIC BLOOD PRESSURE: 128 MMHG | HEIGHT: 60 IN | TEMPERATURE: 97.8 F | RESPIRATION RATE: 12 BRPM

## 2021-08-06 DIAGNOSIS — E66.3 OVERWEIGHT: ICD-10-CM

## 2021-08-06 RX ORDER — PHENTERMINE HYDROCHLORIDE 37.5 MG/1
37.5 TABLET ORAL
Qty: 30 TABLET | Refills: 0 | Status: CANCELLED | OUTPATIENT
Start: 2021-08-06

## 2021-08-09 ENCOUNTER — TELEPHONE (OUTPATIENT)
Dept: FAMILY MEDICINE CLINIC | Facility: CLINIC | Age: 41
End: 2021-08-09

## 2021-08-10 ENCOUNTER — OFFICE VISIT (OUTPATIENT)
Dept: FAMILY MEDICINE CLINIC | Facility: CLINIC | Age: 41
End: 2021-08-10

## 2021-08-10 VITALS
WEIGHT: 161.8 LBS | OXYGEN SATURATION: 96 % | SYSTOLIC BLOOD PRESSURE: 119 MMHG | HEART RATE: 85 BPM | DIASTOLIC BLOOD PRESSURE: 72 MMHG | HEIGHT: 60 IN | BODY MASS INDEX: 31.77 KG/M2

## 2021-08-10 DIAGNOSIS — E66.3 OVERWEIGHT: Primary | ICD-10-CM

## 2021-08-10 PROCEDURE — 99213 OFFICE O/P EST LOW 20 MIN: CPT | Performed by: NURSE PRACTITIONER

## 2021-08-10 RX ORDER — PHENTERMINE HYDROCHLORIDE 37.5 MG/1
CAPSULE ORAL
Qty: 30 CAPSULE | Refills: 0 | Status: SHIPPED | OUTPATIENT
Start: 2021-08-10 | End: 2021-10-07 | Stop reason: SDUPTHER

## 2021-08-10 NOTE — PROGRESS NOTES
"Chief Complaint  Weight Loss (1 month f/u) and Med Refill    Subjective          Angelique Cordon presents to [facility]  Pt is here for 1 month f/u on weight loss. Pt current weight 161.8lbs BMI 31.60 last month her weight was 163 lbs  BMI 32. Last UDS 1/7/21, Tani ran today.    Pt states that feels like the phentermine 37.5mg, working pretty good and she likes the energy it gives her. She is doing Crossfit and watches her diet. She is working from home due to COVID exposures at work and stays she is active when she is home. Denies side effects from the medication. Educated pt on long term effects of phentermine which is why is it prescribed <6m. Denies any side effects of palpitations, soa or htn.               She  has a past medical history of Abnormal bone density screening, Pap smear for cervical cancer screening (2018), and Visit for screening mammogram.     Objective   Vital Signs:   /72 (BP Location: Right arm, Patient Position: Sitting, Cuff Size: Large Adult)   Pulse 85   Ht 152.4 cm (60\")   Wt 73.4 kg (161 lb 12.8 oz)   SpO2 96%   BMI 31.60 kg/m²     Physical Exam  Constitutional:       Appearance: Normal appearance.   Neck:      Thyroid: No thyroid mass, thyromegaly or thyroid tenderness.   Cardiovascular:      Rate and Rhythm: Normal rate and regular rhythm.      Pulses: Normal pulses.      Heart sounds: Normal heart sounds.   Pulmonary:      Effort: Pulmonary effort is normal.      Breath sounds: Normal breath sounds.   Musculoskeletal:      Right lower leg: No edema.      Left lower leg: No edema.   Skin:     General: Skin is warm and dry.   Neurological:      General: No focal deficit present.      Mental Status: She is alert and oriented to person, place, and time.   Psychiatric:         Mood and Affect: Mood normal.         Behavior: Behavior normal.        Result Review :        Assessment and Plan    Diagnoses and all orders for this visit:    1. " Overweight (Primary)        Follow Up   Return in about 4 weeks (around 9/7/2021).  Patient was given instructions and counseling regarding her condition or for health maintenance advice. Please see specific information pulled into the AVS if appropriate.     Angelique EVER Cordon  reports that she has never smoked. She has never used smokeless tobacco.. .

## 2021-08-24 ENCOUNTER — OFFICE VISIT (OUTPATIENT)
Dept: FAMILY MEDICINE CLINIC | Facility: CLINIC | Age: 41
End: 2021-08-24

## 2021-08-24 VITALS
SYSTOLIC BLOOD PRESSURE: 119 MMHG | HEIGHT: 60 IN | DIASTOLIC BLOOD PRESSURE: 76 MMHG | TEMPERATURE: 98.3 F | OXYGEN SATURATION: 100 % | WEIGHT: 159 LBS | HEART RATE: 91 BPM | BODY MASS INDEX: 31.22 KG/M2 | RESPIRATION RATE: 16 BRPM

## 2021-08-24 DIAGNOSIS — J02.9 SORE THROAT: ICD-10-CM

## 2021-08-24 DIAGNOSIS — R05.9 COUGH: Primary | ICD-10-CM

## 2021-08-24 LAB
EXPIRATION DATE: NORMAL
INTERNAL CONTROL: NORMAL
Lab: NORMAL
S PYO AG THROAT QL: NEGATIVE

## 2021-08-24 PROCEDURE — 99213 OFFICE O/P EST LOW 20 MIN: CPT | Performed by: NURSE PRACTITIONER

## 2021-08-24 PROCEDURE — 87880 STREP A ASSAY W/OPTIC: CPT | Performed by: NURSE PRACTITIONER

## 2021-08-24 PROCEDURE — U0003 INFECTIOUS AGENT DETECTION BY NUCLEIC ACID (DNA OR RNA); SEVERE ACUTE RESPIRATORY SYNDROME CORONAVIRUS 2 (SARS-COV-2) (CORONAVIRUS DISEASE [COVID-19]), AMPLIFIED PROBE TECHNIQUE, MAKING USE OF HIGH THROUGHPUT TECHNOLOGIES AS DESCRIBED BY CMS-2020-01-R: HCPCS | Performed by: NURSE PRACTITIONER

## 2021-08-24 RX ORDER — BENZONATATE 100 MG/1
200 CAPSULE ORAL 3 TIMES DAILY PRN
Qty: 21 CAPSULE | Refills: 0 | Status: SHIPPED | OUTPATIENT
Start: 2021-08-24 | End: 2021-12-21

## 2021-08-24 RX ORDER — METHYLPREDNISOLONE 4 MG/1
TABLET ORAL
Qty: 1 EACH | Refills: 0 | Status: SHIPPED | OUTPATIENT
Start: 2021-08-24 | End: 2021-12-21

## 2021-08-24 NOTE — PROGRESS NOTES
"Chief Complaint  Sore Throat and Cough    Subjective          Angelique Cordon presents to [facility] for   History of Present Illness    Patient is here with complaints of a cough and sore throat since Friday. Patient states that it started out as a tickle in her throat but has progressively gotten worse. Cough is non-productive, nasal congestion, drainage.   Medical History  Past Medical History:   Diagnosis Date   • Abnormal bone density screening    • Pap smear for cervical cancer screening 2018   • Visit for screening mammogram      Surgical History  Past Surgical History:   Procedure Laterality Date   • FOOT SURGERY  2003/2009    L FOOT     Social History  Social History     Socioeconomic History   • Marital status:      Spouse name: Not on file   • Number of children: Not on file   • Years of education: Not on file   • Highest education level: Not on file   Tobacco Use   • Smoking status: Never Smoker   • Smokeless tobacco: Never Used   Vaping Use   • Vaping Use: Never used   Substance and Sexual Activity   • Alcohol use: Never   • Drug use: Never   • Sexual activity: Yes     Partners: Male     Birth control/protection: None       Current Outpatient Medications:   •  ibuprofen (ADVIL,MOTRIN) 800 MG tablet, , Disp: , Rfl:   •  phentermine 37.5 MG capsule, Take 1/2 tab in the am and then 1/2 tab at 11., Disp: 30 capsule, Rfl: 0  •  Trintellix 20 MG tablet, , Disp: , Rfl:   •  benzonatate (Tessalon Perles) 100 MG capsule, Take 2 capsules by mouth 3 (Three) Times a Day As Needed for Cough., Disp: 21 capsule, Rfl: 0  •  methylPREDNISolone (MEDROL) 4 MG dose pack, Take as directed on package instructions., Disp: 1 each, Rfl: 0    Review of Systems     Objective     /76 (BP Location: Left arm, Patient Position: Sitting, Cuff Size: Adult)   Pulse 91   Temp 98.3 °F (36.8 °C)   Resp 16   Ht 152.4 cm (60\")   Wt 72.1 kg (159 lb)   SpO2 100%   BMI 31.05 kg/m²     Body " mass index is 31.05 kg/m².    Physical Exam  Vitals reviewed.   Constitutional:       Appearance: Normal appearance. She is well-developed.   HENT:      Head: Normocephalic and atraumatic.      Right Ear: Tympanic membrane, ear canal and external ear normal.      Left Ear: Tympanic membrane, ear canal and external ear normal.      Nose: Congestion and rhinorrhea present.   Eyes:      Conjunctiva/sclera: Conjunctivae normal.      Pupils: Pupils are equal, round, and reactive to light.   Cardiovascular:      Rate and Rhythm: Normal rate and regular rhythm.      Heart sounds: No murmur heard.   No friction rub. No gallop.    Pulmonary:      Effort: Pulmonary effort is normal.      Breath sounds: Normal breath sounds. No wheezing or rhonchi.   Skin:     General: Skin is warm and dry.   Neurological:      Mental Status: She is alert and oriented to person, place, and time.      Cranial Nerves: No cranial nerve deficit.   Psychiatric:         Mood and Affect: Mood and affect normal.         Behavior: Behavior normal.         Thought Content: Thought content normal.         Judgment: Judgment normal.         Result Review :     The following data was reviewed by: ANDREI Olivera on 08/24/2021:    Strep    [facility] 8/24/21   POC Strep A, Molecular Negative                              Assessment:  Diagnoses and all orders for this visit:    1. Cough (Primary)  -     methylPREDNISolone (MEDROL) 4 MG dose pack; Take as directed on package instructions.  Dispense: 1 each; Refill: 0  -     benzonatate (Tessalon Perles) 100 MG capsule; Take 2 capsules by mouth 3 (Three) Times a Day As Needed for Cough.  Dispense: 21 capsule; Refill: 0  -     COVID-19,CEPHEID/ROYCE/BDMAX,COR/LORRAINE/PAD/ESTRELLITA IN-HOUSE(OR EMERGENT/ADD-ON),NP SWAB IN TRANSPORT MEDIA 3-4 HR TAT, RT-PCR - Swab, Nasopharynx; Future  -     COVID-19,CEPHEID/ROYCE/BDMAX,COR/LORRAINE/PAD/ESTRELLITA IN-HOUSE(OR EMERGENT/ADD-ON),NP SWAB IN TRANSPORT MEDIA 3-4 HR TAT, RT-PCR - Swab,  Nasopharynx    2. Sore throat  -     POCT rapid strep A  -     COVID-19,CEPHEID/ROYCE/BDMAX,COR/LORRAINE/PAD/ESTRELLITA IN-HOUSE(OR EMERGENT/ADD-ON),NP SWAB IN TRANSPORT MEDIA 3-4 HR TAT, RT-PCR - Swab, Nasopharynx; Future  -     COVID-19,CEPHEID/ROYCE/BDMAX,COR/LORRAINE/PAD/ESTRELLITA IN-HOUSE(OR EMERGENT/ADD-ON),NP SWAB IN TRANSPORT MEDIA 3-4 HR TAT, RT-PCR - Swab, Nasopharynx        Plan:     Patient is self quarantine pending Covid swab results.  Any worsening symptoms or shortness of breath patient is seek immediate reevaluation.          Follow Up     No follow-ups on file.    Patient was given instructions and counseling regarding her condition or for health maintenance advice. Please see specific information pulled into the AVS if appropriate.     Hannah Parrish, APRN  08/24/2021

## 2021-08-25 LAB — SARS-COV-2 RNA RESP QL NAA+PROBE: DETECTED

## 2021-08-26 ENCOUNTER — TELEPHONE (OUTPATIENT)
Dept: FAMILY MEDICINE CLINIC | Facility: CLINIC | Age: 41
End: 2021-08-26

## 2021-08-26 DIAGNOSIS — M79.672 PAIN IN BOTH FEET: Primary | ICD-10-CM

## 2021-08-26 DIAGNOSIS — M79.671 PAIN IN BOTH FEET: Primary | ICD-10-CM

## 2021-08-26 RX ORDER — IBUPROFEN 800 MG/1
800 TABLET ORAL EVERY 8 HOURS PRN
Qty: 30 TABLET | Refills: 0 | Status: SHIPPED | OUTPATIENT
Start: 2021-08-26 | End: 2021-11-10

## 2021-08-26 NOTE — TELEPHONE ENCOUNTER
----- Message from ANDREI Bustos sent at 8/25/2021 10:18 PM EDT -----  Regarding: COVID-19 Lab Results (Informational Only - No Action Required)  Inform pt she is positive for COVID she needs to quarantine x 10 days-use good handwashing and hand . Avoid contact w/others. How is she feeling?

## 2021-10-07 DIAGNOSIS — E66.3 OVERWEIGHT: ICD-10-CM

## 2021-10-07 RX ORDER — PHENTERMINE HYDROCHLORIDE 37.5 MG/1
CAPSULE ORAL
Qty: 30 CAPSULE | Refills: 0 | Status: SHIPPED | OUTPATIENT
Start: 2021-10-07 | End: 2021-11-19 | Stop reason: SDUPTHER

## 2021-10-07 NOTE — TELEPHONE ENCOUNTER
Caller: Angelique Cordon    Relationship: Self      Medication requested (name and dosage): phentermine 37.5 MG capsule    Pharmacy where request should be sent: [id number] - [place], [place] - [address] AT [facility] [phone]  - [phone]   [phone]    Additional details provided by patient: THE PATIENT STATED SHE IS NEARLY OUT OF MEDICATION AND NEEDS A REFILL TO GET HER THROUGH TO HER NEXT APPOINTMENT ON 11/03/2021. SHE HAS SCHEDULED THE FIRST AVAILABLE WITH PCP    Best call back number: [phone]    Does the patient have less than a 3 day supply:  [x] Yes  [] No    Anny Spicer Rep   10/07/21 07:59 EDT

## 2021-11-10 DIAGNOSIS — M79.672 PAIN IN BOTH FEET: ICD-10-CM

## 2021-11-10 DIAGNOSIS — M79.671 PAIN IN BOTH FEET: ICD-10-CM

## 2021-11-10 RX ORDER — IBUPROFEN 800 MG/1
TABLET ORAL
Qty: 30 TABLET | Refills: 0 | Status: SHIPPED | OUTPATIENT
Start: 2021-11-10 | End: 2022-04-03 | Stop reason: SDUPTHER

## 2021-11-19 ENCOUNTER — TELEMEDICINE (OUTPATIENT)
Dept: FAMILY MEDICINE CLINIC | Facility: CLINIC | Age: 41
End: 2021-11-19

## 2021-11-19 ENCOUNTER — TELEPHONE (OUTPATIENT)
Dept: FAMILY MEDICINE CLINIC | Facility: CLINIC | Age: 41
End: 2021-11-19

## 2021-11-19 DIAGNOSIS — E66.3 OVERWEIGHT: ICD-10-CM

## 2021-11-19 PROCEDURE — 99213 OFFICE O/P EST LOW 20 MIN: CPT | Performed by: NURSE PRACTITIONER

## 2021-11-19 RX ORDER — PHENTERMINE HYDROCHLORIDE 37.5 MG/1
CAPSULE ORAL
Qty: 30 CAPSULE | Refills: 0 | Status: SHIPPED | OUTPATIENT
Start: 2021-11-19 | End: 2021-12-26 | Stop reason: SDUPTHER

## 2021-11-19 NOTE — PROGRESS NOTES
You have chosen to receive care through a telehealth visit.  Do you consent to use a video/audio connection for your medical care today? Yes  Chief Complaint  Med Refill    Subjective          Angelique Cordon presents to [facility]  History of Present IllnessPT PRESENTS TODAY FOR A VIDEO VISIT   PT WOULD LIKE TO DISCUSS MEDICATIONS   PT HAS NO OTHER ISSUES OR CONCERNS. States she has not lost any wt since last visit. States it does help curb her appetite. She has lost 19lbs since Jan. She is working out but reports she has been inconsistent due to her  traveling. Denies htn, palpitations or SOA. Tani (today) and UDS (1/7/21)  up to date.     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     Objective   Vital Signs:   There were no vitals taken for this visit.    Physical Exam   Result Review :            Past Surgical History:   Procedure Laterality Date   • FOOT SURGERY  2003/2009    L FOOT      Family History   Problem Relation Age of Onset   • Diabetes Mother    • Sleep apnea Mother    • COPD Mother    • Hypertension Mother         Current Outpatient Medications:   •  benzonatate (Tessalon Perles) 100 MG capsule, Take 2 capsules by mouth 3 (Three) Times a Day As Needed for Cough., Disp: 21 capsule, Rfl: 0  •  ibuprofen (ADVIL,MOTRIN) 800 MG tablet, TAKE 1 TABLET BY MOUTH EVERY 8 HOURS AS NEEDED FOR MILD PAIN, Disp: 30 tablet, Rfl: 0  •  methylPREDNISolone (MEDROL) 4 MG dose pack, Take as directed on package instructions., Disp: 1 each, Rfl: 0  •  phentermine 37.5 MG capsule, Take 1/2 tab in the am and then 1/2 tab at 11., Disp: 30 capsule, Rfl: 0  •  Trintellix 20 MG tablet, , Disp: , Rfl:    Assessment and Plan    Diagnoses and all orders for this visit:    1. Overweight  -      phentermine 37.5 MG capsule; Take 1/2 tab in the am and then 1/2 tab at 11.  Dispense: 30 capsule; Refill: 0        Follow Up   Return in about 4 weeks (around 12/17/2021).  Patient was given instructions and counseling regarding her condition or for health maintenance advice. Please see specific information pulled into the AVS if appropriate.     Angelique CURTIS Bravo  reports that she has never smoked. She has never used smokeless tobacco..            ANDREI Bustos

## 2021-12-20 ENCOUNTER — PATIENT MESSAGE (OUTPATIENT)
Dept: FAMILY MEDICINE CLINIC | Facility: CLINIC | Age: 41
End: 2021-12-20

## 2021-12-21 ENCOUNTER — OFFICE VISIT (OUTPATIENT)
Dept: FAMILY MEDICINE CLINIC | Facility: CLINIC | Age: 41
End: 2021-12-21

## 2021-12-21 VITALS
HEIGHT: 60 IN | HEART RATE: 68 BPM | WEIGHT: 157 LBS | DIASTOLIC BLOOD PRESSURE: 77 MMHG | OXYGEN SATURATION: 99 % | SYSTOLIC BLOOD PRESSURE: 113 MMHG | BODY MASS INDEX: 30.82 KG/M2

## 2021-12-21 DIAGNOSIS — Z12.31 BREAST CANCER SCREENING BY MAMMOGRAM: ICD-10-CM

## 2021-12-21 DIAGNOSIS — M79.89 NODULE OF SOFT TISSUE: Primary | ICD-10-CM

## 2021-12-21 PROCEDURE — 99214 OFFICE O/P EST MOD 30 MIN: CPT | Performed by: NURSE PRACTITIONER

## 2021-12-21 NOTE — PROGRESS NOTES
"Chief Complaint  Bump on left side  Subjective          Angelique Cordon presents to [facility] for   History of Present Illness  States noticed a little bump on left side ribs near axilla approx a couple weeks ago. States has not changed during this time but it is somewhat painful to touch. Has not been red or any color change.     Pt states has not had a mammo in the last year. Denies any breast symptoms, no family history of breast cancer.    Medical History  Past Medical History:   Diagnosis Date   • Abnormal bone density screening    • Pap smear for cervical cancer screening 2018   • Visit for screening mammogram      Surgical History  Past Surgical History:   Procedure Laterality Date   • FOOT SURGERY  2003/2009    L FOOT     Social History  Social History     Socioeconomic History   • Marital status:    Tobacco Use   • Smoking status: Never Smoker   • Smokeless tobacco: Never Used   Vaping Use   • Vaping Use: Never used   Substance and Sexual Activity   • Alcohol use: Never   • Drug use: Never   • Sexual activity: Yes     Partners: Male     Birth control/protection: None       Current Outpatient Medications:   •  ibuprofen (ADVIL,MOTRIN) 800 MG tablet, TAKE 1 TABLET BY MOUTH EVERY 8 HOURS AS NEEDED FOR MILD PAIN, Disp: 30 tablet, Rfl: 0  •  phentermine 37.5 MG capsule, Take 1/2 tab in the am and then 1/2 tab at 11., Disp: 30 capsule, Rfl: 0  •  Trintellix 20 MG tablet, , Disp: , Rfl:     Review of Systems     Objective     /77   Pulse 68   Ht 152.4 cm (60\")   Wt 71.2 kg (157 lb)   SpO2 99%   BMI 30.66 kg/m²     Body mass index is 30.66 kg/m².    Physical Exam  Vitals reviewed.   Constitutional:       Appearance: Normal appearance. She is well-developed.   HENT:      Head: Normocephalic and atraumatic.      Right Ear: External ear normal.      Left Ear: External ear normal.   Eyes:      Conjunctiva/sclera: Conjunctivae normal.      Pupils: Pupils are equal, " round, and reactive to light.   Cardiovascular:      Rate and Rhythm: Normal rate and regular rhythm.      Heart sounds: No murmur heard.  No friction rub. No gallop.    Pulmonary:      Effort: Pulmonary effort is normal.      Breath sounds: Normal breath sounds. No wheezing or rhonchi.   Skin:     General: Skin is warm and dry.      Comments: Posterior to left axilla patient has superficial subcentimeter firm mobile nodule.  Mildly tender to palpation, no palpable axillary nodes   Neurological:      Mental Status: She is alert and oriented to person, place, and time.      Cranial Nerves: No cranial nerve deficit.   Psychiatric:         Mood and Affect: Mood and affect normal.         Behavior: Behavior normal.         Thought Content: Thought content normal.         Judgment: Judgment normal.         Result Review :     The following data was reviewed by: ANDREI Olivera on 12/21/2021:                         Assessment:  Diagnoses and all orders for this visit:    1. Nodule of soft tissue (Primary)  -     US Chest; Future    2. Breast cancer screening by mammogram  -     Mammo Screening Digital Tomosynthesis Bilateral With CAD; Future        Instructed patient to call and follow-up if she has not heard from the scheduling hub for an appointment within 1 week        Follow Up     Return if symptoms worsen or fail to improve, for Follow-up with PCP if any changes.    Patient was given instructions and counseling regarding her condition or for health maintenance advice. Please see specific information pulled into the AVS if appropriate.     ANDREI Olivera  12/21/2021

## 2021-12-26 DIAGNOSIS — E66.3 OVERWEIGHT: ICD-10-CM

## 2021-12-26 RX ORDER — PHENTERMINE HYDROCHLORIDE 37.5 MG/1
CAPSULE ORAL
Qty: 30 CAPSULE | Refills: 0 | Status: SHIPPED | OUTPATIENT
Start: 2021-12-26 | End: 2022-05-12

## 2021-12-26 NOTE — TELEPHONE ENCOUNTER
From: Angelique Cordon  To: Maira Oviedo, ANDREI  Sent: 12/20/2021 11:44 AM EST  Subject: Phentermine     Hey Dr. Jaramillo     Hope your planning a great Neelam. I forgot if I had a refill left on this RX. I know we have a follow up next month. I am on my last 2 days of it and feel like I am doing amazing with it. Even on my scale it's going down YEA!! can you let me know if I have 1 refi left on it. I would love to keep it going till my apt next month!     Thank you   Angelique

## 2022-01-12 ENCOUNTER — TELEMEDICINE (OUTPATIENT)
Dept: FAMILY MEDICINE CLINIC | Facility: CLINIC | Age: 42
End: 2022-01-12

## 2022-01-12 VITALS — BODY MASS INDEX: 30.86 KG/M2 | WEIGHT: 158 LBS

## 2022-01-12 DIAGNOSIS — E66.9 OBESITY (BMI 30-39.9): Primary | ICD-10-CM

## 2022-01-12 PROCEDURE — 99213 OFFICE O/P EST LOW 20 MIN: CPT | Performed by: NURSE PRACTITIONER

## 2022-01-12 NOTE — PROGRESS NOTES
You have chosen to receive care through a telehealth visit.  Do you consent to use a video/audio connection for your medical care today? Yes  Chief Complaint  Obesity (f/u)     Pt states that her weight keeps staying between 157- 160 lbs. Pt thinks her current weight is 158 lb. She has lost 2 lbs since last visit. She has lost a total of 10lbs. Discussed need to transition off the phentermine. Prescribed contrave.     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     Subjective          Angelique Cordon presents to [facility]  History of Present Illness    She  has a past medical history of Abnormal bone density screening, Pap smear for cervical cancer screening (2018), and Visit for screening mammogram.     Objective   Vital Signs:   Wt 71.7 kg (158 lb)   BMI 30.86 kg/m²     Physical Exam  Constitutional:       Appearance: Normal appearance.   Neurological:      General: No focal deficit present.      Mental Status: She is alert and oriented to person, place, and time.   Psychiatric:         Mood and Affect: Mood normal.         Behavior: Behavior normal.        Result Review :        Past Surgical History:   Procedure Laterality Date   • FOOT SURGERY  2003/2009    L FOOT      Family History   Problem Relation Age of Onset   • Diabetes Mother    • Sleep apnea Mother    • COPD Mother    • Hypertension Mother         Current Outpatient Medications:   •  ibuprofen (ADVIL,MOTRIN) 800 MG tablet, TAKE 1 TABLET BY MOUTH EVERY 8 HOURS AS NEEDED FOR MILD PAIN, Disp: 30 tablet, Rfl: 0  •  naltrexone-bupropion ER (CONTRAVE) 8-90 MG tablet, Wk 1: 1 tab daily, Wk 2: 1 tab twice a day, Wk 3: 2 tabs in AM, 1 tab in PM, Wk 4: 2 tabs twice a day, Maintenance dose: 2 tabs twice daily., Disp: 120 tablet, Rfl: 0  •  phentermine 37.5 MG capsule, Take 1/2 tab in the am and then 1/2 tab at 11., Disp: 30 capsule, Rfl: 0  •  Trintellix 20 MG  tablet, , Disp: , Rfl:    Assessment and Plan    Diagnoses and all orders for this visit:    1. Obesity (BMI 30-39.9) (Primary)  Comments:  discontinued phentermine. Prescribed Contrave -instructed pt on taper. Scheduled 1 month follow up.   Orders:  -     naltrexone-bupropion ER (CONTRAVE) 8-90 MG tablet; Wk 1: 1 tab daily, Wk 2: 1 tab twice a day, Wk 3: 2 tabs in AM, 1 tab in PM, Wk 4: 2 tabs twice a day, Maintenance dose: 2 tabs twice daily.  Dispense: 120 tablet; Refill: 0        Follow Up   Return in about 4 weeks (around 2/9/2022).  Patient was given instructions and counseling regarding her condition or for health maintenance advice. Please see specific information pulled into the AVS if appropriate.     Angelique EVER Cordon  reports that she has never smoked. She has never used smokeless tobacco..                   ANDREI Bustos    The patient is advised to attempt to lose weight.

## 2022-01-26 ENCOUNTER — HOSPITAL ENCOUNTER (OUTPATIENT)
Dept: ULTRASOUND IMAGING | Facility: HOSPITAL | Age: 42
Discharge: HOME | End: 2022-01-26

## 2022-01-26 ENCOUNTER — HOSPITAL ENCOUNTER (OUTPATIENT)
Dept: MAMMOGRAPHY | Facility: HOSPITAL | Age: 42
Discharge: HOME | End: 2022-01-26

## 2022-01-26 DIAGNOSIS — Z12.31 BREAST CANCER SCREENING BY MAMMOGRAM: ICD-10-CM

## 2022-01-26 DIAGNOSIS — M79.89 NODULE OF SOFT TISSUE: ICD-10-CM

## 2022-01-26 PROCEDURE — 77063 BREAST TOMOSYNTHESIS BI: CPT

## 2022-01-26 PROCEDURE — 77067 SCR MAMMO BI INCL CAD: CPT

## 2022-01-26 PROCEDURE — 76604 US EXAM CHEST: CPT

## 2022-01-27 ENCOUNTER — TELEPHONE (OUTPATIENT)
Dept: FAMILY MEDICINE CLINIC | Facility: CLINIC | Age: 42
End: 2022-01-27

## 2022-01-27 NOTE — TELEPHONE ENCOUNTER
----- Message from ANDREI Arteaga sent at 1/27/2022  7:21 AM EST -----  Results appear most consistent with a sebaceous cyst.  If patient desires, refer to general surgery for probable excision

## 2022-01-27 NOTE — TELEPHONE ENCOUNTER
----- Message from ANDREI Arteaga sent at 1/27/2022 11:30 AM EST -----  Mammogram normal, repeat in 1 year

## 2022-03-08 DIAGNOSIS — N60.89 SEBACEOUS CYST OF SKIN OF BREAST, UNSPECIFIED LATERALITY: Primary | ICD-10-CM

## 2022-03-24 ENCOUNTER — OFFICE VISIT (OUTPATIENT)
Dept: SURGERY | Facility: CLINIC | Age: 42
End: 2022-03-24

## 2022-03-24 VITALS — WEIGHT: 160 LBS | HEIGHT: 60 IN | BODY MASS INDEX: 31.41 KG/M2 | RESPIRATION RATE: 15 BRPM

## 2022-03-24 DIAGNOSIS — L72.3 SEBACEOUS CYST: Primary | ICD-10-CM

## 2022-03-24 PROCEDURE — 99203 OFFICE O/P NEW LOW 30 MIN: CPT | Performed by: SURGERY

## 2022-03-24 RX ORDER — ONDANSETRON 2 MG/ML
4 INJECTION INTRAMUSCULAR; INTRAVENOUS EVERY 6 HOURS PRN
Status: CANCELLED | OUTPATIENT
Start: 2022-03-24

## 2022-03-24 NOTE — PROGRESS NOTES
Chief Complaint: Breast Mass    Subjective         History of Present Illness  Angelique Cordon is a 42 y.o. female presents to [facility] to be seen for sebaceous cyst of left axilla.   It became inflamed and she would like it removed.    Narrative & Impression   PROCEDURE:  MAMMO SCREENING DIGITAL TOMOSYNTHESIS BILATERAL W CAD     COMPARISON: None, baseline.      VIEWS:  BILATERAL CC AND MLO VIEWS WERE OBTAINED UTILIZING 3D TOMOSYNTHESIS AND R2 CAD SOFTWARE     INDICATIONS:  SCREENING     FINDINGS:     No suspicious mass, area of architectural distortion or suspicious microcalcification is   identified.      IMPRESSION:     Benign mammogram. Suggest routine mammographic screening.     RECOMMENDATION(S):               ROUTINE MAMMOGRAM AND CLINICAL EVALUATION IN 12 MONTHS.          Narrative & Impression   PROCEDURE:  US CHEST     COMPARISON:     INDICATIONS:  sub-cm superficial nodule posterior to left axilla     TECHNIQUE:    A limited ultrasound examination of the left axilla was performed.       FINDINGS:          Evaluation of the left axilla demonstrates a 6 x 5 x 4 mm cystic lesion in the subcutaneous   tissues, likely a sebaceous cyst.     IMPRESSION:               6 mm cystic lesion within subcutaneous tissues in area of concern, likely a sebaceous   cyst.           Objective     Past Medical History:   Diagnosis Date   • Abnormal bone density screening    • Pap smear for cervical cancer screening 2018   • Visit for screening mammogram        Past Surgical History:   Procedure Laterality Date   • FOOT SURGERY  2003/2009    L FOOT         Current Outpatient Medications:   •  ibuprofen (ADVIL,MOTRIN) 800 MG tablet, TAKE 1 TABLET BY MOUTH EVERY 8 HOURS AS NEEDED FOR MILD PAIN, Disp: 30 tablet, Rfl: 0  •  naltrexone-bupropion ER (CONTRAVE) 8-90 MG tablet, Wk 1: 1 tab daily, Wk 2: 1 tab twice a day, Wk 3: 2 tabs in AM, 1 tab in PM, Wk 4: 2 tabs twice a day, Maintenance dose: 2  "tabs twice daily., Disp: 120 tablet, Rfl: 0  •  phentermine 37.5 MG capsule, Take 1/2 tab in the am and then 1/2 tab at 11., Disp: 30 capsule, Rfl: 0  •  Trintellix 20 MG tablet, , Disp: , Rfl:     No Known Allergies     Family History   Problem Relation Age of Onset   • Diabetes Mother    • Sleep apnea Mother    • COPD Mother    • Hypertension Mother        Social History     Socioeconomic History   • Marital status:    Tobacco Use   • Smoking status: Never Smoker   • Smokeless tobacco: Never Used   Vaping Use   • Vaping Use: Never used   Substance and Sexual Activity   • Alcohol use: Never   • Drug use: Never   • Sexual activity: Yes     Partners: Male     Birth control/protection: None       Vital Signs:   Resp 15   Ht 152.4 cm (60\")   Wt 72.6 kg (160 lb)   BMI 31.25 kg/m²    Review of Systems    Physical Exam  Vitals and nursing note reviewed.   Constitutional:       General: She is not in acute distress.     Appearance: Normal appearance. She is well-developed.   HENT:      Head: Normocephalic and atraumatic.   Eyes:      Extraocular Movements: Extraocular movements intact.      Pupils: Pupils are equal, round, and reactive to light.   Cardiovascular:      Pulses: Normal pulses.   Pulmonary:      Effort: Pulmonary effort is normal. No retractions.      Breath sounds: Normal air entry. No wheezing.   Abdominal:      General: There is no distension.      Palpations: Abdomen is soft.      Tenderness: There is no abdominal tenderness.      Hernia: No hernia is present.   Musculoskeletal:         General: No swelling or deformity.      Cervical back: Neck supple.   Skin:     General: Skin is warm and dry.      Findings: No erythema.      Comments: 1 cm left axillary sebaceous cyst   Neurological:      General: No focal deficit present.      Mental Status: She is alert and oriented to person, place, and time.      Motor: Motor function is intact.   Psychiatric:         Mood and Affect: Mood normal.         " Thought Content: Thought content normal.          Result Review :               Assessment and Plan    Diagnoses and all orders for this visit:    1. Sebaceous cyst (Primary)  -     Case Request; Standing  -     Case Request    Plan for excision on 4/1/22    Follow Up   Return for Next scheduled followup after surgery.  Patient was given instructions and counseling regarding her condition or for health maintenance advice. Please see specific information pulled into the AVS if appropriate.         This document has been electronically signed by Rosalie Talley MD  March 24, 2022 10:05 EDT

## 2022-04-01 ENCOUNTER — ANESTHESIA EVENT (OUTPATIENT)
Dept: PERIOP | Facility: HOSPITAL | Age: 42
End: 2022-04-01

## 2022-04-01 ENCOUNTER — HOSPITAL ENCOUNTER (OUTPATIENT)
Facility: HOSPITAL | Age: 42
Setting detail: HOSPITAL OUTPATIENT SURGERY
Discharge: HOME | End: 2022-04-01
Attending: SURGERY | Admitting: SURGERY

## 2022-04-01 ENCOUNTER — ANESTHESIA (OUTPATIENT)
Dept: PERIOP | Facility: HOSPITAL | Age: 42
End: 2022-04-01

## 2022-04-01 VITALS
HEART RATE: 74 BPM | WEIGHT: 171.3 LBS | OXYGEN SATURATION: 100 % | DIASTOLIC BLOOD PRESSURE: 74 MMHG | BODY MASS INDEX: 31.52 KG/M2 | HEIGHT: 62 IN | TEMPERATURE: 97.4 F | RESPIRATION RATE: 20 BRPM | SYSTOLIC BLOOD PRESSURE: 113 MMHG

## 2022-04-01 DIAGNOSIS — L72.3 SEBACEOUS CYST: ICD-10-CM

## 2022-04-01 LAB — B-HCG UR QL: NEGATIVE

## 2022-04-01 PROCEDURE — 25010000002 FENTANYL CITRATE (PF) 50 MCG/ML SOLUTION: Performed by: NURSE ANESTHETIST, CERTIFIED REGISTERED

## 2022-04-01 PROCEDURE — 88304 TISSUE EXAM BY PATHOLOGIST: CPT | Performed by: SURGERY

## 2022-04-01 PROCEDURE — 25010000002 KETOROLAC TROMETHAMINE PER 15 MG: Performed by: NURSE ANESTHETIST, CERTIFIED REGISTERED

## 2022-04-01 PROCEDURE — 25010000002 PROPOFOL 10 MG/ML EMULSION: Performed by: NURSE ANESTHETIST, CERTIFIED REGISTERED

## 2022-04-01 PROCEDURE — 25010000002 DEXAMETHASONE PER 1 MG: Performed by: NURSE ANESTHETIST, CERTIFIED REGISTERED

## 2022-04-01 PROCEDURE — C1889 IMPLANT/INSERT DEVICE, NOC: HCPCS | Performed by: SURGERY

## 2022-04-01 PROCEDURE — 25010000002 MIDAZOLAM PER 1 MG: Performed by: ANESTHESIOLOGY

## 2022-04-01 PROCEDURE — 11402 EXC TR-EXT B9+MARG 1.1-2 CM: CPT | Performed by: SURGERY

## 2022-04-01 PROCEDURE — 81025 URINE PREGNANCY TEST: CPT | Performed by: SURGERY

## 2022-04-01 PROCEDURE — 25010000002 ONDANSETRON PER 1 MG: Performed by: SURGERY

## 2022-04-01 PROCEDURE — 12031 INTMD RPR S/A/T/EXT 2.5 CM/<: CPT | Performed by: SURGERY

## 2022-04-01 DEVICE — LIGACLIP MCA MULTIPLE CLIP APPLIERS, 20 MEDIUM CLIPS
Type: IMPLANTABLE DEVICE | Site: AXILLA | Status: FUNCTIONAL
Brand: LIGACLIP

## 2022-04-01 RX ORDER — FENTANYL CITRATE 50 UG/ML
INJECTION, SOLUTION INTRAMUSCULAR; INTRAVENOUS AS NEEDED
Status: DISCONTINUED | OUTPATIENT
Start: 2022-04-01 | End: 2022-04-01 | Stop reason: SURG

## 2022-04-01 RX ORDER — HYDROCODONE BITARTRATE AND ACETAMINOPHEN 5; 325 MG/1; MG/1
1-2 TABLET ORAL EVERY 4 HOURS PRN
Qty: 10 TABLET | Refills: 0 | Status: SHIPPED | OUTPATIENT
Start: 2022-04-01 | End: 2022-05-12

## 2022-04-01 RX ORDER — ACETAMINOPHEN 500 MG
1000 TABLET ORAL ONCE
Status: COMPLETED | OUTPATIENT
Start: 2022-04-01 | End: 2022-04-01

## 2022-04-01 RX ORDER — BUPIVACAINE HYDROCHLORIDE 2.5 MG/ML
INJECTION, SOLUTION EPIDURAL; INFILTRATION; INTRACAUDAL AS NEEDED
Status: DISCONTINUED | OUTPATIENT
Start: 2022-04-01 | End: 2022-04-01 | Stop reason: HOSPADM

## 2022-04-01 RX ORDER — PROMETHAZINE HYDROCHLORIDE 25 MG/1
25 SUPPOSITORY RECTAL ONCE AS NEEDED
Status: DISCONTINUED | OUTPATIENT
Start: 2022-04-01 | End: 2022-04-01 | Stop reason: HOSPADM

## 2022-04-01 RX ORDER — SODIUM CHLORIDE, SODIUM LACTATE, POTASSIUM CHLORIDE, CALCIUM CHLORIDE 600; 310; 30; 20 MG/100ML; MG/100ML; MG/100ML; MG/100ML
9 INJECTION, SOLUTION INTRAVENOUS CONTINUOUS PRN
Status: DISCONTINUED | OUTPATIENT
Start: 2022-04-01 | End: 2022-04-01 | Stop reason: HOSPADM

## 2022-04-01 RX ORDER — CLINDAMYCIN PHOSPHATE 900 MG/50ML
900 INJECTION INTRAVENOUS ONCE
Status: COMPLETED | OUTPATIENT
Start: 2022-04-01 | End: 2022-04-01

## 2022-04-01 RX ORDER — PROMETHAZINE HYDROCHLORIDE 12.5 MG/1
25 TABLET ORAL ONCE AS NEEDED
Status: DISCONTINUED | OUTPATIENT
Start: 2022-04-01 | End: 2022-04-01 | Stop reason: HOSPADM

## 2022-04-01 RX ORDER — PROPOFOL 10 MG/ML
VIAL (ML) INTRAVENOUS AS NEEDED
Status: DISCONTINUED | OUTPATIENT
Start: 2022-04-01 | End: 2022-04-01 | Stop reason: SURG

## 2022-04-01 RX ORDER — OXYCODONE HYDROCHLORIDE 5 MG/1
5 TABLET ORAL
Status: DISCONTINUED | OUTPATIENT
Start: 2022-04-01 | End: 2022-04-01 | Stop reason: HOSPADM

## 2022-04-01 RX ORDER — PHENYLEPHRINE HCL IN 0.9% NACL 1 MG/10 ML
SYRINGE (ML) INTRAVENOUS AS NEEDED
Status: DISCONTINUED | OUTPATIENT
Start: 2022-04-01 | End: 2022-04-01 | Stop reason: SURG

## 2022-04-01 RX ORDER — MEPERIDINE HYDROCHLORIDE 25 MG/ML
12.5 INJECTION INTRAMUSCULAR; INTRAVENOUS; SUBCUTANEOUS
Status: DISCONTINUED | OUTPATIENT
Start: 2022-04-01 | End: 2022-04-01 | Stop reason: HOSPADM

## 2022-04-01 RX ORDER — SCOLOPAMINE TRANSDERMAL SYSTEM 1 MG/1
1 PATCH, EXTENDED RELEASE TRANSDERMAL CONTINUOUS
Status: DISCONTINUED | OUTPATIENT
Start: 2022-04-01 | End: 2022-04-01 | Stop reason: HOSPADM

## 2022-04-01 RX ORDER — ONDANSETRON 4 MG/1
4 TABLET, FILM COATED ORAL ONCE AS NEEDED
Status: DISCONTINUED | OUTPATIENT
Start: 2022-04-01 | End: 2022-04-01 | Stop reason: HOSPADM

## 2022-04-01 RX ORDER — DEXAMETHASONE SODIUM PHOSPHATE 4 MG/ML
INJECTION, SOLUTION INTRA-ARTICULAR; INTRALESIONAL; INTRAMUSCULAR; INTRAVENOUS; SOFT TISSUE AS NEEDED
Status: DISCONTINUED | OUTPATIENT
Start: 2022-04-01 | End: 2022-04-01 | Stop reason: SURG

## 2022-04-01 RX ORDER — ONDANSETRON 2 MG/ML
4 INJECTION INTRAMUSCULAR; INTRAVENOUS ONCE AS NEEDED
Status: DISCONTINUED | OUTPATIENT
Start: 2022-04-01 | End: 2022-04-01 | Stop reason: HOSPADM

## 2022-04-01 RX ORDER — GLYCOPYRROLATE 0.2 MG/ML
0.2 INJECTION INTRAMUSCULAR; INTRAVENOUS
Status: COMPLETED | OUTPATIENT
Start: 2022-04-01 | End: 2022-04-01

## 2022-04-01 RX ORDER — MIDAZOLAM HYDROCHLORIDE 1 MG/ML
2 INJECTION INTRAMUSCULAR; INTRAVENOUS ONCE
Status: COMPLETED | OUTPATIENT
Start: 2022-04-01 | End: 2022-04-01

## 2022-04-01 RX ORDER — MAGNESIUM HYDROXIDE 1200 MG/15ML
LIQUID ORAL AS NEEDED
Status: DISCONTINUED | OUTPATIENT
Start: 2022-04-01 | End: 2022-04-01 | Stop reason: HOSPADM

## 2022-04-01 RX ORDER — KETOROLAC TROMETHAMINE 30 MG/ML
INJECTION, SOLUTION INTRAMUSCULAR; INTRAVENOUS AS NEEDED
Status: DISCONTINUED | OUTPATIENT
Start: 2022-04-01 | End: 2022-04-01 | Stop reason: SURG

## 2022-04-01 RX ORDER — ONDANSETRON 2 MG/ML
4 INJECTION INTRAMUSCULAR; INTRAVENOUS EVERY 6 HOURS PRN
Status: DISCONTINUED | OUTPATIENT
Start: 2022-04-01 | End: 2022-04-01 | Stop reason: HOSPADM

## 2022-04-01 RX ADMIN — PROPOFOL 50 MG: 10 INJECTION, EMULSION INTRAVENOUS at 08:43

## 2022-04-01 RX ADMIN — MIDAZOLAM HYDROCHLORIDE 2 MG: 1 INJECTION, SOLUTION INTRAMUSCULAR; INTRAVENOUS at 08:30

## 2022-04-01 RX ADMIN — Medication 100 MCG: at 08:56

## 2022-04-01 RX ADMIN — CLINDAMYCIN IN 5 PERCENT DEXTROSE 900 MG: 18 INJECTION, SOLUTION INTRAVENOUS at 08:39

## 2022-04-01 RX ADMIN — GLYCOPYRROLATE 0.2 MG: 0.2 INJECTION INTRAMUSCULAR; INTRAVENOUS at 08:30

## 2022-04-01 RX ADMIN — PROPOFOL 200 MG: 10 INJECTION, EMULSION INTRAVENOUS at 08:42

## 2022-04-01 RX ADMIN — DEXAMETHASONE SODIUM PHOSPHATE 4 MG: 4 INJECTION INTRA-ARTICULAR; INTRALESIONAL; INTRAMUSCULAR; INTRAVENOUS; SOFT TISSUE at 08:42

## 2022-04-01 RX ADMIN — FENTANYL CITRATE 25 MCG: 50 INJECTION, SOLUTION INTRAMUSCULAR; INTRAVENOUS at 08:56

## 2022-04-01 RX ADMIN — ACETAMINOPHEN 1000 MG: 500 TABLET ORAL at 07:26

## 2022-04-01 RX ADMIN — SCOPALAMINE 1 PATCH: 1 PATCH, EXTENDED RELEASE TRANSDERMAL at 07:26

## 2022-04-01 RX ADMIN — KETOROLAC TROMETHAMINE 30 MG: 30 INJECTION, SOLUTION INTRAMUSCULAR; INTRAVENOUS at 09:02

## 2022-04-01 RX ADMIN — FENTANYL CITRATE 25 MCG: 50 INJECTION, SOLUTION INTRAMUSCULAR; INTRAVENOUS at 08:51

## 2022-04-01 RX ADMIN — ONDANSETRON 4 MG: 2 INJECTION INTRAMUSCULAR; INTRAVENOUS at 09:01

## 2022-04-01 RX ADMIN — SODIUM CHLORIDE, POTASSIUM CHLORIDE, SODIUM LACTATE AND CALCIUM CHLORIDE 9 ML/HR: 600; 310; 30; 20 INJECTION, SOLUTION INTRAVENOUS at 07:26

## 2022-04-01 NOTE — ANESTHESIA POSTPROCEDURE EVALUATION
Patient: Angelique Cordon    Procedure Summary     Date: 04/01/22 Room / Location: [facility] OR [address] [place] [address] OR [facility]    Anesthesia Start: 0839 Anesthesia Stop: 0916    Procedure: EXCISION CYST LEFT AXILLARY (Left ) Diagnosis:       Sebaceous cyst      (Sebaceous cyst [L72.3])    Surgeons: Rosalie Talley MD Provider: Song Paula MD    Anesthesia Type: general ASA Status: 1          Anesthesia Type: general    Vitals  Vitals Value Taken Time   /71 04/01/22 0926   Temp 36.3 °C (97.4 °F) 04/01/22 0915   Pulse 73 04/01/22 0928   Resp 20 04/01/22 0925   SpO2 100 % 04/01/22 0928   Vitals shown include unvalidated device data.        Post Anesthesia Care and Evaluation    Patient location during evaluation: bedside  Patient participation: complete - patient participated  Level of consciousness: awake  Pain management: adequate  Airway patency: patent  Anesthetic complications: No anesthetic complications  PONV Status: none  Cardiovascular status: acceptable  Respiratory status: acceptable  Hydration status: acceptable    Comments: An Anesthesiologist personally participated in the most demanding procedures (including induction and emergence if applicable) in the anesthesia plan, monitored the course of anesthesia administration at frequent intervals and remained physically present and available for immediate diagnosis and treatment of emergencies.

## 2022-04-01 NOTE — OP NOTE
EXCISION LESION  Procedure Report    Patient Name:  Angelique Cordon  YOB: 1980    Date of Surgery:  4/1/2022     Indications:  Sebaceous cyst    Pre-op Diagnosis:   Sebaceous cyst [L72.3]       Post-Op Diagnosis Codes:     * Sebaceous cyst [L72.3]    Procedure/CPT® Codes:  Procedure(s):  EXCISION CYST LEFT AXILLARY    Staff:  Surgeon(s):  Rosalie Talley MD    Assistant: Kiesha Dubon CSA    Anesthesia: Monitored Anesthesia Care    Estimated Blood Loss: 5 mL    Implants:    Implant Name Type Inv. Item Serial No.  Lot No. LRB No. Used Action   CLIPAPPLR M/ ENDO LIGACLIP 20CLP 11IN MD - [id number] Implant CLIPAPPLR M/ ENDO LIGACLIP 20CLP 11IN MD  [employer]  [employer] J [employer] J [id number] Left 1 Implanted       Specimen:          Specimens     ID Source Type Tests Collected By Collected At Frozen?    A Axilla, Left Tissue · TISSUE PATHOLOGY EXAM   Rosalie Talley MD 4/1/22 0854     Description: left axillary cyst    Comment: One clip anterior / 2 clips superior    This specimen was not marked as sent.              Findings: none    Complications: none    Description of Procedure:   After informed consent, the patient was taken to the OR placed supine on the table, and after adequate anesthesia prepped and draped in usual sterile fashion. We began by making elliptical incision around the cyst and dissecting down using electrocautery to reach normal-appearing tissue. The cyst was then excised circumferentially and handed off as a specimen.  It was about 1.5 x 1 cm in size.   Electrocautery was used to achieve hemostasis. The wound was copiously irrigated and the deeper layers were closed with 2-0 Vicryl and skin was closed with a running 4 subcuticular stitch. Patient was taken to recovery in good condition. All counts were correct at the end of the case  Assistant: Kiesha Dubon CSA  was responsible for performing the following activities: Irrigation and Closing and their  skilled assistance was necessary for the success of this case.    Rosalie Talley MD     Date: 4/1/2022  Time: 09:23 EDT

## 2022-04-01 NOTE — DISCHARGE INSTRUCTIONS
DISCHARGE INSTRUCTIONS  SURGICAL / AMBULATORY  PROCEDURES      For your surgery you had:  General anesthesia (you may have a sore throat for the first 24 hours)  You received a medicated patch for nausea prevention today (behind your ear). It is recommended that you remove it 24-48 hours post-operatively. It must be removed within 72 hours.   You may experience dizziness, drowsiness, or light-headedness for several hours following surgery/procedure.  Do not stay alone today or tonight.  Limit your activity for 24 hours.  Resume your diet slowly.  Follow whatever special dietary instructions you may have been given by your doctor.  You should not drive or operate machinery, drink alcohol, or sign legally binding documents for 24 hours or while you are taking pain medication.  Last dose of pain medication was given at:   .  NOTIFY YOUR DOCTOR IF YOU EXPERIENCE ANY OF THE FOLLOWING:  Temperature greater than 101 degrees Fahrenheit  Shaking Chills  Redness or excessive drainage from incision  Nausea, vomiting and/or pain that is not controlled by prescribed medications  Increase in bleeding or bleeding that is excessive  Unable to urinate in 6 hours after surgery  If unable to reach your doctor, please go to the closest Emergency Room     Other:  REMOVE DRESSING ON  sUNDAY  .  You may showerSUNDAY  .  Apply an ice pack 24-48 hours.  Medications per physician instructions as indicated on Discharge Medication Information Sheet.       SPECIAL INSTRUCTIONS:  MAY TAKE AN OVER THE COUNTER STOOL SOFTENER AS NEEDED

## 2022-04-01 NOTE — ANESTHESIA PREPROCEDURE EVALUATION
Anesthesia Evaluation     Patient summary reviewed and Nursing notes reviewed                Airway   Mallampati: I  TM distance: >3 FB  Neck ROM: full  No difficulty expected  Dental      Pulmonary - negative pulmonary ROS and normal exam    breath sounds clear to auscultation  Cardiovascular - negative cardio ROS and normal exam    Rhythm: regular  Rate: normal        Neuro/Psych- negative ROS  GI/Hepatic/Renal/Endo    (+) obesity,       Musculoskeletal (-) negative ROS    Abdominal    Substance History - negative use     OB/GYN negative ob/gyn ROS         Other                        Anesthesia Plan    ASA 1     general     intravenous induction     Anesthetic plan, all risks, benefits, and alternatives have been provided, discussed and informed consent has been obtained with: patient.        CODE STATUS:

## 2022-04-03 DIAGNOSIS — M79.671 PAIN IN BOTH FEET: ICD-10-CM

## 2022-04-03 DIAGNOSIS — M79.672 PAIN IN BOTH FEET: ICD-10-CM

## 2022-04-04 LAB
CYTO UR: NORMAL
LAB AP CASE REPORT: NORMAL
LAB AP CLINICAL INFORMATION: NORMAL
PATH REPORT.FINAL DX SPEC: NORMAL
PATH REPORT.GROSS SPEC: NORMAL

## 2022-04-04 RX ORDER — IBUPROFEN 800 MG/1
800 TABLET ORAL EVERY 8 HOURS PRN
Qty: 30 TABLET | Refills: 0 | Status: SHIPPED | OUTPATIENT
Start: 2022-04-04 | End: 2022-09-29

## 2022-04-06 ENCOUNTER — TELEPHONE (OUTPATIENT)
Dept: SURGERY | Facility: CLINIC | Age: 42
End: 2022-04-06

## 2022-04-06 NOTE — TELEPHONE ENCOUNTER
I called the patient and told her she does not need to replace the steri-strips, and that they will fall off on their own.

## 2022-04-06 NOTE — TELEPHONE ENCOUNTER
Excision of left axillary cyst by Dr. Talley 04/01/22.  She removed bandage and showered Sunday.  The steri-strips are coming up a little and pulling.  Can she or does she need to replace the steri-strips?    April, will you address?

## 2022-04-08 ENCOUNTER — TELEPHONE (OUTPATIENT)
Dept: SURGERY | Facility: CLINIC | Age: 42
End: 2022-04-08

## 2022-04-08 NOTE — TELEPHONE ENCOUNTER
Caller: Angelique Cordon    Relationship: Self    Best call back number: [phone]  What is the best time to reach you: ANYTIME    Who are you requesting to speak with (clinical staff, provider,  specific staff member): CLINICAL    Do you know the name of the person who called: NAGELIQUE  What was the call regarding:PT WOULD LIKE TO ASK IF SHE COULD START APPLYING SCARRING CREAM TO WOUND AREA  Do you require a callback: YES

## 2022-04-11 DIAGNOSIS — E66.9 OBESITY (BMI 30-39.9): Primary | ICD-10-CM

## 2022-04-13 NOTE — PROGRESS NOTES
"Chief Complaint  Follow-up    Subjective          History of Present Illness  The patient is here to follow up on excision of cyst.  They are doing well and have no complaints.  Pathology is shown below:    Clinical Information    Comment:    Sebaceous cyst      Final Diagnosis   Left axillary cyst, excision:               - Ruptured epidermal inclusion cyst       Objective   Vital Signs:   Resp 15   Ht 157.5 cm (62\")   Wt 77.6 kg (171 lb)   BMI 31.28 kg/m²     Physical Exam  Vitals and nursing note reviewed.   Constitutional:       General: She is not in acute distress.     Appearance: Normal appearance. She is well-developed.   HENT:      Head: Normocephalic and atraumatic.   Eyes:      Extraocular Movements: Extraocular movements intact.      Pupils: Pupils are equal, round, and reactive to light.   Cardiovascular:      Pulses: Normal pulses.   Pulmonary:      Effort: Pulmonary effort is normal. No retractions.      Breath sounds: Normal air entry. No wheezing.   Abdominal:      General: There is no distension.      Palpations: Abdomen is soft.      Tenderness: There is no abdominal tenderness.      Hernia: No hernia is present.   Musculoskeletal:         General: No swelling or deformity.      Cervical back: Neck supple.   Skin:     General: Skin is warm and dry.      Findings: No erythema.      Comments: Surgical Incision Healing Well   Neurological:      General: No focal deficit present.      Mental Status: She is alert and oriented to person, place, and time.      Motor: Motor function is intact.   Psychiatric:         Mood and Affect: Mood normal.         Thought Content: Thought content normal.            Result Review :                 Assessment and Plan    Diagnoses and all orders for this visit:    1. Sebaceous cyst (Primary)    Followup prn    Follow Up   Return if symptoms worsen or fail to improve.  Patient was given instructions and counseling regarding her condition or for health maintenance " advice. Please see specific information pulled into the AVS if appropriate.

## 2022-04-14 ENCOUNTER — OFFICE VISIT (OUTPATIENT)
Dept: SURGERY | Facility: CLINIC | Age: 42
End: 2022-04-14

## 2022-04-14 VITALS — WEIGHT: 171 LBS | RESPIRATION RATE: 15 BRPM | BODY MASS INDEX: 31.47 KG/M2 | HEIGHT: 62 IN

## 2022-04-14 DIAGNOSIS — L72.3 SEBACEOUS CYST: Primary | ICD-10-CM

## 2022-04-14 PROCEDURE — 99024 POSTOP FOLLOW-UP VISIT: CPT | Performed by: SURGERY

## 2022-05-12 ENCOUNTER — OFFICE VISIT (OUTPATIENT)
Dept: FAMILY MEDICINE CLINIC | Facility: CLINIC | Age: 42
End: 2022-05-12

## 2022-05-12 VITALS
HEIGHT: 62 IN | BODY MASS INDEX: 30.88 KG/M2 | OXYGEN SATURATION: 100 % | SYSTOLIC BLOOD PRESSURE: 124 MMHG | DIASTOLIC BLOOD PRESSURE: 64 MMHG | WEIGHT: 167.8 LBS | HEART RATE: 74 BPM

## 2022-05-12 DIAGNOSIS — R60.9 LIPEDEMA: ICD-10-CM

## 2022-05-12 DIAGNOSIS — E66.01 MORBID (SEVERE) OBESITY DUE TO EXCESS CALORIES: ICD-10-CM

## 2022-05-12 DIAGNOSIS — Z79.899 LONG TERM USE OF DRUG: Primary | ICD-10-CM

## 2022-05-12 PROCEDURE — 80305 DRUG TEST PRSMV DIR OPT OBS: CPT | Performed by: NURSE PRACTITIONER

## 2022-05-12 PROCEDURE — 99213 OFFICE O/P EST LOW 20 MIN: CPT | Performed by: NURSE PRACTITIONER

## 2022-05-12 RX ORDER — PHENTERMINE HYDROCHLORIDE 37.5 MG/1
TABLET ORAL
Qty: 30 TABLET | Refills: 0 | Status: SHIPPED | OUTPATIENT
Start: 2022-05-12 | End: 2022-06-08 | Stop reason: SDUPTHER

## 2022-05-12 NOTE — ASSESSMENT & PLAN NOTE
- Stop taking Contrave.  - I have ordered a urine drug screen today  - Phentermine was sent to the pharmacy on file.  - I will follow up with her referral to lymphedema clinic.  - Follow up appointment for a weight check in 1 month.

## 2022-05-12 NOTE — PROGRESS NOTES
"Chief Complaint  Obesity (Contrave f/u)    Subjective          Angelique Cordon presents to [facility]  History of Present Illness  Pt states that she never started the contrave d/t reading the reviews.    The patient is here for a weight check up.  She is here to discuss her weight today. She mentions she would like to go back on phentermine today. She reports the last time she was taking phentermine was 12/26/2021. She mentions she took phentermine for 5 months. She mentions she has concerns taking Contrave with the side effects she researched. She denies any side effects of elevated blood pressure,  elevated heart rate, shortness of breath, palpitations or constipation while on phentermine. She reports she tolerated this medication well. She mentions she just experienced a lot of energy while taking phentermine. She agrees to urine drug screen today. The patient reports that her insurance will pay for weight loss surgery but they will not pay for weight loss medication. She mentions she is not interested in bypass surgery. She denies any family history of weight problems. She reports that she is still exercising and her girlfriend is a ; they work out together. She reportedly tries to go to the gym at least 3 times per week but sometimes she gets busy at work. She reports that she does Weight Watchers at home for her eating habits for a couple of weeks. She mentions when she started Weight Watchers her weight was 170 pounds and this morning she weighed 166 pounds. She agrees to monthly weight check office appointments.    She mentions that she is still waiting on the referral to the lymphedema clinic. She reports that the referral was made but she has not heard from them. She reports her knee is \"sunken in.\"    Patient Care Team:  Maira Oviedo APRN as PCP - General (Nurse Practitioner)  Rosalie Talley MD as Consulting Physician (General Surgery) " "  She  has a past medical history of Abnormal bone density screening, Anxiety, Pap smear for cervical cancer screening (2018), Sebaceous cyst of left axilla (04/01/2022), and Visit for screening mammogram.     Objective   Vital Signs:   /64 (BP Location: Right arm, Patient Position: Sitting, Cuff Size: Adult)   Pulse 74   Ht 157.5 cm (62\")   Wt 76.1 kg (167 lb 12.8 oz)   SpO2 100%   BMI 30.69 kg/m²     Physical Exam  Constitutional:       Appearance: Normal appearance. She is obese.   Cardiovascular:      Rate and Rhythm: Normal rate and regular rhythm.      Pulses: Normal pulses.      Heart sounds: Normal heart sounds.   Pulmonary:      Effort: Pulmonary effort is normal.      Breath sounds: Normal breath sounds.   Skin:     General: Skin is warm and dry.   Neurological:      Mental Status: She is alert and oriented to person, place, and time.   Psychiatric:         Mood and Affect: Mood normal.         Behavior: Behavior normal.        Result Review :        Past Surgical History:   Procedure Laterality Date   • EXCISION LESION Left 4/1/2022    Procedure: EXCISION CYST LEFT AXILLARY;  Surgeon: Rosalie Talley MD;  Location: [facility] OR [facility];  Service: General;  Laterality: Left;   • FOOT SURGERY Left 2003/2009 HAD 3 DIFFERENT SURGERIES    L FOOT/TENDON RELEASE PLANTAR FASIA      Family History   Problem Relation Age of Onset   • Diabetes Mother    • Sleep apnea Mother    • COPD Mother    • Hypertension Mother    • Malig Hyperthermia Neg Hx         Current Outpatient Medications:   •  ibuprofen (ADVIL,MOTRIN) 800 MG tablet, Take 1 tablet by mouth Every 8 (Eight) Hours As Needed for Mild Pain ., Disp: 30 tablet, Rfl: 0  •  Melatonin 10 MG/ML liquid, Take 5 mg by mouth Every Night., Disp: , Rfl:   •  naltrexone-bupropion ER (CONTRAVE) 8-90 MG tablet, Wk 1: 1 tab daily, Wk 2: 1 tab twice a day, Wk 3: 2 tabs in AM, 1 tab in PM, Wk 4: 2 tabs twice a day, Maintenance dose: 2 tabs twice daily., Disp: 120 " tablet, Rfl: 0  •  phentermine (ADIPEX-P) 37.5 MG tablet, Take 1/2 tab in the am with breakfast then take the other 1/2 at lunch, Disp: 30 tablet, Rfl: 0  •  Trintellix 20 MG tablet, Take 20 mg by mouth Every Morning., Disp: , Rfl:    Assessment and Plan    Diagnoses and all orders for this visit:    1. Long term use of drug (Primary)  Comments:  jerry mehta and controlled consent obtained today  Orders:  -     POC Urine Drug Screen [facility]    2. Morbid (severe) obesity due to excess calories (HCC)  Comments:  prescribed phentermine-instructed to reports side effects of htn, palpitations or SOA. Instructed to continue weight watchers  Orders:  -     phentermine (ADIPEX-P) 37.5 MG tablet; Take 1/2 tab in the am with breakfast then take the other 1/2 at lunch  Dispense: 30 tablet; Refill: 0    3. Lipedema  Comments:  [facility]  Assessment & Plan:   - Stop taking Contrave.  - I have ordered a urine drug screen today  - Phentermine was sent to the pharmacy on file.  - I will follow up with her referral to lymphedema clinic.  - Follow up appointment for a weight check in 1 month.    Orders:  -     Ambulatory Referral to Lymphedema Clinic      Follow Up   Return in about 4 weeks (around 6/9/2022).  Patient was given instructions and counseling regarding her condition or for health maintenance advice. Please see specific information pulled into the AVS if appropriate.     Angelique Cordon  reports that she has never smoked. She has never used smokeless tobacco..               The patient is advised to begin progressive daily aerobic exercise program, follow a low fat, low cholesterol diet and attempt to lose weight.    Transcribed from ambient dictation for ANDREI Bustos by Angelique Jones.  05/12/22   16:28 EDT    Patient verbalized consent to the visit recording.

## 2022-05-16 ENCOUNTER — TELEPHONE (OUTPATIENT)
Dept: FAMILY MEDICINE CLINIC | Facility: CLINIC | Age: 42
End: 2022-05-16

## 2022-05-16 NOTE — TELEPHONE ENCOUNTER
Caller: Angelique Cordon    Relationship: Self    Best call back number:[phone]    What is the best time to reach you ANY TIME     Who are you requesting to speak with (clinical staff, provider,  specific staff member): CLINICAL         What was the call regarding: PATIENT STATES THAT SHE HAS A BLANK REFERRAL ON HER [facility] ACCOUNT.   HOWEVER, SHE IS SUPPOSE TO HAVE A REFERRAL FOR LIPEDEMA AND WAS JUST FOLLOWING UP ON IT SINCE IT HAS BEEN ABOUT 2 AND A HALF WEEKS AND SHE STILL NOT BEEN CONTACTED.     Do you require a callback: YES

## 2022-06-08 ENCOUNTER — TELEMEDICINE (OUTPATIENT)
Dept: FAMILY MEDICINE CLINIC | Facility: CLINIC | Age: 42
End: 2022-06-08

## 2022-06-08 VITALS — HEIGHT: 62 IN | BODY MASS INDEX: 28.89 KG/M2 | WEIGHT: 157 LBS

## 2022-06-08 DIAGNOSIS — E66.3 OVERWEIGHT (BMI 25.0-29.9): ICD-10-CM

## 2022-06-08 PROCEDURE — 99213 OFFICE O/P EST LOW 20 MIN: CPT | Performed by: NURSE PRACTITIONER

## 2022-06-08 RX ORDER — PHENTERMINE HYDROCHLORIDE 37.5 MG/1
TABLET ORAL
Qty: 30 TABLET | Refills: 0 | Status: SHIPPED | OUTPATIENT
Start: 2022-06-08 | End: 2022-07-11 | Stop reason: SDUPTHER

## 2022-06-08 NOTE — PROGRESS NOTES
"You have chosen to receive care through a telehealth visit.  Do you consent to use a video/audio connection for your medical care today? Yes  Chief Complaint  Obesity    Subjective          Angelique Cordon presents to [facility]  History of Present Illness     She is here today for a follow up on phentermine. She reports she has lost 10lbs since last visit. Denies any side effects of the medication. States she has been seeing a  and doing Weight Watchers. Tani, uds and controlled consent up to date.     Tani done today. uds 5/12/22      Patient Care Team:  Maira Oviedo APRN as PCP - General (Nurse Practitioner)  Rosalie Talley MD as Consulting Physician (General Surgery)   She  has a past medical history of Abnormal bone density screening, Anxiety, Pap smear for cervical cancer screening (2018), Sebaceous cyst of left axilla (04/01/2022), and Visit for screening mammogram.     Objective   Vital Signs:   Ht 157.5 cm (62\")   Wt 71.2 kg (157 lb)   BMI 28.72 kg/m²     Physical Exam  Constitutional:       Appearance: Normal appearance. She is obese.   Neurological:      General: No focal deficit present.      Mental Status: She is alert and oriented to person, place, and time.   Psychiatric:         Mood and Affect: Mood normal.         Behavior: Behavior normal.        Result Review :        Past Surgical History:   Procedure Laterality Date   • EXCISION LESION Left 4/1/2022    Procedure: EXCISION CYST LEFT AXILLARY;  Surgeon: Rosalie Talley MD;  [facility] [place];  Service: General;  Laterality: Left;   • FOOT SURGERY Left 2003/2009 HAD 3 DIFFERENT SURGERIES    L FOOT/TENDON RELEASE PLANTAR FASIA      Family History   Problem Relation Age of Onset   • Diabetes Mother    • Sleep apnea Mother    • COPD Mother    • Hypertension Mother    • Malig Hyperthermia Neg Hx         Current Outpatient Medications:   •  phentermine (ADIPEX-P) 37.5 " MG tablet, Take 1/2 tab in the am with breakfast then take the other 1/2 at lunch, Disp: 30 tablet, Rfl: 0  •  ibuprofen (ADVIL,MOTRIN) 800 MG tablet, Take 1 tablet by mouth Every 8 (Eight) Hours As Needed for Mild Pain ., Disp: 30 tablet, Rfl: 0  •  Melatonin 10 MG/ML liquid, Take 5 mg by mouth Every Night., Disp: , Rfl:   •  Trintellix 20 MG tablet, Take 20 mg by mouth Every Morning., Disp: , Rfl:    Assessment and Plan    Diagnoses and all orders for this visit:    1. Overweight (BMI 25.0-29.9)  Comments:  prescribed phentermine-instructed to reports side effects of htn, palpitations or SOA. Instructed to continue weight watchers  Orders:  -     phentermine (ADIPEX-P) 37.5 MG tablet; Take 1/2 tab in the am with breakfast then take the other 1/2 at lunch  Dispense: 30 tablet; Refill: 0        Follow Up   Return in about 4 weeks (around 7/6/2022).  Patient was given instructions and counseling regarding her condition or for health maintenance advice. Please see specific information pulled into the AVS if appropriate.     Angelique EVER Cordon  reports that she has never smoked. She has never used smokeless tobacco.            ANDREI Bustos    The patient is advised to begin progressive daily aerobic exercise program, follow a low fat, low cholesterol diet, attempt to lose weight and continue current medications.

## 2022-06-09 ENCOUNTER — HOSPITAL ENCOUNTER (OUTPATIENT)
Dept: OCCUPATIONAL THERAPY | Facility: HOSPITAL | Age: 42
Setting detail: THERAPIES SERIES
Discharge: HOME | End: 2022-06-09

## 2022-06-09 DIAGNOSIS — I89.0 LYMPHEDEMA: Primary | ICD-10-CM

## 2022-06-09 DIAGNOSIS — I83.93 VARICOSE VEINS OF BOTH LOWER EXTREMITIES, UNSPECIFIED WHETHER COMPLICATED: ICD-10-CM

## 2022-06-09 DIAGNOSIS — R60.9 LIPOEDEMA: ICD-10-CM

## 2022-06-09 PROCEDURE — 97166 OT EVAL MOD COMPLEX 45 MIN: CPT

## 2022-06-09 PROCEDURE — 97535 SELF CARE MNGMENT TRAINING: CPT

## 2022-06-09 NOTE — THERAPY EVALUATION
Outpatient Occupational Therapy Lymphedema Initial Evaluation  [facility]     Patient Name: Angelique Cordon  : 1980  MRN: [id number]  Today's Date: 2022      Visit Date: 2022    Patient Active Problem List   Diagnosis   • Pap smear for cervical cancer screening   • Depression   • Overweight   • Sebaceous cyst        Past Medical History:   Diagnosis Date   • Abnormal bone density screening    • Anxiety    • Pap smear for cervical cancer screening    • Sebaceous cyst of left axilla 2022   • Visit for screening mammogram         Past Surgical History:   Procedure Laterality Date   • EXCISION LESION Left 2022    Procedure: EXCISION CYST LEFT AXILLARY;  Surgeon: Rosalie Talley MD;  [facility] [place];  Service: General;  Laterality: Left;   • FOOT SURGERY Left  HAD 3 DIFFERENT SURGERIES    L FOOT/TENDON RELEASE PLANTAR FASIA         Visit Dx:     ICD-10-CM ICD-9-CM   1. Lymphedema  I89.0 457.1   2. Lipoedema  R60.9 782.3   3. Varicose veins of both lower extremities, unspecified whether complicated  I83.93 454.9                      Therapy Education  Education Details: Occupational therapist explained the difference between lipedema and lymphedema including the etiology of how it occurs and symptoms.  Occupational therapist explained that in this clinic, we can affect the lymphedema part of her condition, but the lipedema requires medical management.  Patient verbalized knowledge.  Occupational therapist provided education for manual lymph drainage and patient was provided with written instructions as well.  Patient return demonstrated properly after instruction.  She is asked to perform self manual lymphatic drainage at least 1 time a day.  Given: HEP, Symptoms/condition management  Program: New  How Provided: Verbal, Demonstration, Written  Provided to: Patient  Level of Understanding: Verbalized, Demonstrated    LE circumferential measurement:   Right   Left  20.7 20.5   20.5 20.0   30.5 28.0   39.5 38.3   41.5 41.8   34.8 35.1  56.7 52.5  66.5 63.2  76.0 72.0  Total circ:   R 387ml  L 371 ml     Trunk circ:  Hips (widest with feet together) 121.0  umbellicus                                  91.8   8cm below umbellicus               109.5  Total trunk                                  322 ml                 Goals:   1. Uncontrolled lymphedema of Bilateral  lower extremity/lower quadrant.  LTG1:  90 days:  Volumetric measurements of lower extremity/lower quadrant will be decreased by 10% or until plateau in reduction is achieved to improve functional mobility.           STATUS:  New   STG1a:  30 days:  Volumetric measurements of bilateral lower extremity/lower quadrant will be decreased by 5% bilaterally to improve functional mobility.    STATUS:  New  TREATMENT:  Manual Therapy, Therapeutic exercise, ADL/self-care therapy, Bioimpedence Fluid Analysis, Orthotic management and training, Therapeutic Activity, wound dressing as needed, Modalities: TENS, NMES, Ultrasound, Fluidotherapy, , and Patient and family/caregiver education.    2. Patient with decreased ADL/Self-Care routine for lymphedema management.   LTG 2:  90 days:  Patient/caregiver will independently verbalize/demonstrate ADL/Self-Care routine for lymphedema management.           STATUS:  New   STG 2a:  30 days:  Patient/caregiver will independently perform or verbally dictate compression wrapping technique of the lower extremity/lower quadrant.           STATUS:  New   STG2b:  30 days:  Patient will independently verbalize signs and symptoms of infection.                    STATUS:  New   STG2c:  30 days:  Patient will independently demonstrate/verbalize proper skin care routine to prevent infection and or wound development.            STATUS:  New  STG2d:  30 days:  Patient will independently perform teach back of HEP routine.           STATUS: New  STG2e:  30 days:  Patient/caregiver will obtain  properly fitting compression orthosis, will demonstrate don/doff skill of compression orthosis with modified independence, and independently verbalize wear schedule.          STATUS: New   STG2f: 30 days: Patient/caregiver will independently perform self-manual lymphatic drainage of the lower extremity/lower quadrant.          STATUS: New  TREATMENT:  Therapeutic Activity, Patient/Caregiver Education, ADL retraining, Manual Therapy, Orthotic Management and training, Modalities: TENS, NMES, Ultrasound, Fluidotherapy Wound dressing if necessary,  Therapeutic Exercise, Modalities     3. Self-Care Limitations     LTG 3: 90 days:  The patient will demonstrate improved quality of life by achieving a score of 20 or less on the Lymphedema Life Impact Scale.           STATUS:  New   STG 3a: 30 days:  The patient will demonstrate improved quality of life by achieving a score of of 24 or less on the Lymphedema Life Impact Scale.           STATUS:  New  TREATMENT:  Manual therapy, therapeutic exercise, therapeutic activity, ADL retraining, Patient/caregiver education, Modalities: TENS, NMES, Ultrasound, Fluidotherapy Orthotic Management and Training, Wound dressing as needed.    OT eval complexity:   Examination:   Performance Deficits include: Health management, leisure exploration, dressing   # deficits affecting performance: 3  Decision Making:   Treatment Options Considered : Health promotion, remediation/restoration, maintain, prevent  # Treatment options considered : 4  Modification Made for: None  Level of Task Modification: Not applicable  Comorbidity Affect Performance: Nonapplicable  How is performance affected: Nonapplicable  Evaluation Level Determined: Moderate    Time Calculation:   Timed Charges  95921 - OT Therapeutic Activity Minutes: 15  Untimed Charges  OT Eval/Re-eval Minutes: 45  Total Minutes  Timed Charges Total Minutes: 15  Untimed Charges Total Minutes: 45   Total Minutes: 60                   Rach  Daniella, OT  7/12/2022

## 2022-06-22 ENCOUNTER — TELEPHONE (OUTPATIENT)
Dept: FAMILY MEDICINE CLINIC | Facility: CLINIC | Age: 42
End: 2022-06-22

## 2022-06-22 NOTE — TELEPHONE ENCOUNTER
Caller: Angelique Cordon    Relationship: Self    Best call back number: [phone]    What is the best time to reach you: ANYTIME    Who are you requesting to speak with (clinical staff, provider,  specific staff member): CODING    What was the call regarding: PATIENT IS REQUESTING THE CTT CODE FOR LYMPHEDEMA. SHE WAS INFORMED BY [facility] THAT THEY ARE NEEDING THE CODE TO SEE IF THEY WOULD BE ABLE TO COVER THE SURGERY FOR THIS. SHE IS REQUESTING CALL WITH CODE.     Do you require a callback: YES

## 2022-06-27 ENCOUNTER — APPOINTMENT (OUTPATIENT)
Dept: OCCUPATIONAL THERAPY | Facility: HOSPITAL | Age: 42
End: 2022-06-27

## 2022-06-28 ENCOUNTER — HOSPITAL ENCOUNTER (OUTPATIENT)
Dept: OCCUPATIONAL THERAPY | Facility: HOSPITAL | Age: 42
Setting detail: THERAPIES SERIES
Discharge: HOME | End: 2022-06-28

## 2022-06-28 DIAGNOSIS — I89.0 LYMPHEDEMA: Primary | ICD-10-CM

## 2022-06-28 DIAGNOSIS — R60.9 LIPOEDEMA: ICD-10-CM

## 2022-06-28 DIAGNOSIS — I83.93 VARICOSE VEINS OF BOTH LOWER EXTREMITIES, UNSPECIFIED WHETHER COMPLICATED: ICD-10-CM

## 2022-06-28 PROCEDURE — 97140 MANUAL THERAPY 1/> REGIONS: CPT

## 2022-06-28 PROCEDURE — 97535 SELF CARE MNGMENT TRAINING: CPT

## 2022-06-28 NOTE — THERAPY TREATMENT NOTE
Outpatient Occupational Therapy Lymphedema Treatment Note   [facility]     Patient Name: Angelique Cordon  : 1980  MRN: [id number]  Today's Date: 2022      Visit Date: 2022    Patient Active Problem List   Diagnosis   • Pap smear for cervical cancer screening   • Depression   • Overweight   • Sebaceous cyst        Past Medical History:   Diagnosis Date   • Abnormal bone density screening    • Anxiety    • Pap smear for cervical cancer screening    • Sebaceous cyst of left axilla 2022   • Visit for screening mammogram         Past Surgical History:   Procedure Laterality Date   • EXCISION LESION Left 2022    Procedure: EXCISION CYST LEFT AXILLARY;  Surgeon: Rosalie Talley MD;  [place] [facility] [place] [facility] [place]  Service: General;  Laterality: Left;   • FOOT SURGERY Left  HAD 3 DIFFERENT SURGERIES    L FOOT/TENDON RELEASE PLANTAR FASIA         Visit Dx:      ICD-10-CM ICD-9-CM   1. Lymphedema  I89.0 457.1   2. Lipoedema  R60.9 782.3   3. Varicose veins of both lower extremities, unspecified whether complicated  I83.93 454.9        Lymphedema     Row Name 22 1300 22 1100          Subjective Pain    Able to rate subjective pain? yes  -MP yes  -MP     Pre-Treatment Pain Level 0  -MP 0  -MP     Post-Treatment Pain Level 0  -MP 0  -MP     Subjective Pain Comment Patient denies pain.  She states she is eager to begin the compression wrapping.  -MP Pt states she is concerned about  -MP            Manual Lymphatic Drainage    Manual Lymphatic Drainage initial sequence;opened regional lymph nodes;opened anastamoses;extremity treatment  -MP --     Initial Sequence short neck;supraclavicular;diaphragmatic breathing  -MP --     Supraclavicular right;left  -MP --     Diaphragmatic Breathing Abdominal breathing technique  -MP --     Opened Regional Lymph Nodes axillary;inguinal  -MP --     Inguinal right;left  -MP --     Opened Anastamoses inguino-axillary  -MP --     Inguino-Axillary  right;left  -MP --     Extremity Treatment MLD to proximal limb only  -MP --            Compression/Skin Care    Compression/Skin Care skin care;wrapping location  -MP --     Skin Care lotion applied  -MP --     Wrapping Location lower extremity  -MP --     Wrapping Location LE bilateral:;foot to groin  -MP --     Wrapping Comments isoband hip spica bilaterally  -MP --           User Key  (r) = Recorded By, (t) = Taken By, (c) = Cosigned By    Initials Name Provider Type    Rach Alonso, OT Occupational Therapist                         OT Assessment/Plan     Row Name 06/28/22 1321          OT Assessment    Assessment Comments Patient tolerated initial treatment for manual lymphatic drainage and compression wrapping at bilateral lower extremities well.  She understands that she should maintain compression as long as possible between today and next treatment which is 2 days from now.  She is agreeable to continue with home exercise program as well.  She will continue to benefit from skilled occupational therapy services for reduction in lymph volume until a plateau is met and she is independent with complete decongestive therapy.  -MP           User Key  (r) = Recorded By, (t) = Taken By, (c) = Cosigned By    Initials Name Provider Type    Rcah Alonso, OT Occupational Therapist                    Manual Rx (last 36 hours)     Manual Treatments     Row Name 06/28/22 1323             Total Minutes    29787 - OT Manual Therapy Minutes 53  -MP            User Key  (r) = Recorded By, (t) = Taken By, (c) = Cosigned By    Initials Name Provider Type    Rach Alonso, OT Occupational Therapist               OT Goals     Row Name 06/28/22 1323          Time Calculation    OT Goal Re-Cert Due Date 07/28/22  -MP           User Key  (r) = Recorded By, (t) = Taken By, (c) = Cosigned By    Initials Name Provider Type    Rach Alonso OT Occupational Therapist                Therapy Education  Education  Details: Occupational therapy this provided education regarding need to maintain compression in place as long as possible between treatment sessions and patient is agreeable.  95104 - OT Self Care/Mgmt Minutes: 10        Time Calculation:   Timed Charges  48458 - OT Manual Therapy Minutes: 53  02647 - OT Self Care/Mgmt Minutes: 10  Total Minutes  Timed Charges Total Minutes: 63   Total Minutes: 63     Therapy Charges for Today     Code Description Service Date Service Provider Modifiers Qty    11826199340 HC OT MANUAL THERAPY EA 15 MIN 6/28/2022 Rach Brewer OT GO 3    05391219096 HC OT SELF CARE/MGMT/TRAIN EA 15 MIN 6/28/2022 Rach Brewer OT GO 1                      Rach Brewer OT  6/28/2022

## 2022-07-08 ENCOUNTER — HOSPITAL ENCOUNTER (OUTPATIENT)
Dept: OCCUPATIONAL THERAPY | Facility: HOSPITAL | Age: 42
Setting detail: THERAPIES SERIES
Discharge: HOME | End: 2022-07-08

## 2022-07-08 DIAGNOSIS — I83.93 VARICOSE VEINS OF BOTH LOWER EXTREMITIES, UNSPECIFIED WHETHER COMPLICATED: ICD-10-CM

## 2022-07-08 DIAGNOSIS — R60.9 LIPOEDEMA: ICD-10-CM

## 2022-07-08 DIAGNOSIS — I89.0 LYMPHEDEMA: Primary | ICD-10-CM

## 2022-07-08 PROCEDURE — 97140 MANUAL THERAPY 1/> REGIONS: CPT

## 2022-07-08 NOTE — THERAPY TREATMENT NOTE
Outpatient Occupational Therapy Lymphedema Treatment Note   Narinder     Patient Name: Angelique Cordon  : 1980  MRN: [id number]  Today's Date: 2022      Visit Date: 2022    Patient Active Problem List   Diagnosis   • Pap smear for cervical cancer screening   • Depression   • Overweight   • Sebaceous cyst        Past Medical History:   Diagnosis Date   • Abnormal bone density screening    • Anxiety    • Pap smear for cervical cancer screening    • Sebaceous cyst of left axilla 2022   • Visit for screening mammogram         Past Surgical History:   Procedure Laterality Date   • EXCISION LESION Left 2022    Procedure: EXCISION CYST LEFT AXILLARY;  Surgeon: Rosalie Talley MD;  Location: [facility] OR [facility] [place];  Service: General;  Laterality: Left;   • FOOT SURGERY Left  HAD 3 DIFFERENT SURGERIES    L FOOT/TENDON RELEASE PLANTAR FASIA         Visit Dx:      ICD-10-CM ICD-9-CM   1. Lymphedema  I89.0 457.1   2. Lipoedema  R60.9 782.3   3. Varicose veins of both lower extremities, unspecified whether complicated  I83.93 454.9        Lymphedema     Row Name 22 0900             Subjective Pain    Able to rate subjective pain? yes  -SC      Pre-Treatment Pain Level 0  -SC      Post-Treatment Pain Level 0  -SC              Subjective Comments    Subjective Comments Patient states that the Isoband that was applied in a hip spica configuration at last appointment rolled up cutting into her waist and she had to remove it that night.  Patient states she was able to keep compression wrapping on for 2 days prior to having to remove  -SC              Manual Lymphatic Drainage    Manual Lymphatic Drainage initial sequence;opened regional lymph nodes;opened anastamoses;extremity treatment  -SC      Initial Sequence short neck;cervical;supraclavicular;shoulder collectors  -SC      Opened Regional Lymph Nodes inguinal;axillary  -SC      Axillary right;left  -SC      Inguinal right;left  -SC       Opened Anastamoses inguino-axillary  -SC      Extremity Treatment MLD to proximal limb only  -SC      Manual Lymphatic Drainage Comments Bilateral thighs  -SC              Compression/Skin Care    Compression/Skin Care skin care;wrapping location;bandaging  -SC      Skin Care moisturizing lotion applied  -SC      Wrapping Location lower extremity  -SC      Wrapping Location LE bilateral:;foot to groin  -SC      Bandage Layers cotton liner;padding/fluff layer;short-stretch bandages (comment size/quantity)  Isoband  -SC      Bandaging Comments Isoband applied to circumference of bilateral thighs to help prevent wraps from slipping too quickly  -SC      Bandaging Technique circumferential/spiral;strong compression  -SC            User Key  (r) = Recorded By, (t) = Taken By, (c) = Cosigned By    Initials Name Provider Type    Vero Zuniga COTA Occupational Therapist Assistant                                Manual Rx (last 36 hours)     Manual Treatments     Row Name 07/08/22 1003             Total Minutes    91504 - OT Manual Therapy Minutes 40  -SC            User Key  (r) = Recorded By, (t) = Taken By, (c) = Cosigned By    Initials Name Provider Type    Vero Zuniga COTA Occupational Therapist Assistant                  Therapy Education  Education Details: Patient stating that she has her daughter's birthday party this weekend and was wanting to know if she could remove compression wrapping for the party and have her  rewrap her at the end.  Therapist suggesting patient keep the wraps on until that morning which is 48 hours from now and then have  rewrap.  Therapist educating patient on importance of continuing and compression to assist with optimal reduction of lymph volume bilaterally.  Therapist educating patient on garment options.  Therapist suggesting patient purchase [employer] micro massaging compression shorts once patient is ready.  Therapist educating patient that because it does  not have a specific compression grade insurance does not cover.  Therapist providing patient with website and name of shorts in order for patient to research and be ready for cost.  Therapist reeducating patient on the need to remove lymph volume prior to purchasing garments.  Patient does state that she has an appointment with a doctor that specializes in surgeries for lipidemia.  Patient does want to go through with the surgery.  Given: Symptoms/condition management  Program: New, Reinforced  How Provided: Verbal  Provided to: Patient  Level of Understanding: Verbalized  45874 - OT Self Care/Mgmt Minutes: 10                Time Calculation:   Timed Charges  78237 - OT Manual Therapy Minutes: 40  95066 - OT Self Care/Mgmt Minutes: 10  Total Minutes  Timed Charges Total Minutes: 50   Total Minutes: 50     Therapy Charges for Today     Code Description Service Date Service Provider Modifiers Qty    [id number]  OT MANUAL THERAPY EA 15 MIN 7/8/2022 Vero Mckinney COTA GO 3                      ESTEFANIA Elliott  7/8/2022

## 2022-07-11 DIAGNOSIS — E66.3 OVERWEIGHT (BMI 25.0-29.9): ICD-10-CM

## 2022-07-11 RX ORDER — PHENTERMINE HYDROCHLORIDE 37.5 MG/1
TABLET ORAL
Qty: 30 TABLET | Refills: 0 | Status: SHIPPED | OUTPATIENT
Start: 2022-07-11 | End: 2022-08-10 | Stop reason: SDUPTHER

## 2022-07-12 ENCOUNTER — APPOINTMENT (OUTPATIENT)
Dept: OCCUPATIONAL THERAPY | Facility: HOSPITAL | Age: 42
End: 2022-07-12

## 2022-07-14 ENCOUNTER — APPOINTMENT (OUTPATIENT)
Dept: OCCUPATIONAL THERAPY | Facility: HOSPITAL | Age: 42
End: 2022-07-14

## 2022-07-19 ENCOUNTER — HOSPITAL ENCOUNTER (OUTPATIENT)
Dept: OCCUPATIONAL THERAPY | Facility: HOSPITAL | Age: 42
Setting detail: THERAPIES SERIES
Discharge: HOME | End: 2022-07-19

## 2022-07-19 DIAGNOSIS — R60.9 LIPOEDEMA: ICD-10-CM

## 2022-07-19 DIAGNOSIS — I89.0 LYMPHEDEMA: Primary | ICD-10-CM

## 2022-07-19 DIAGNOSIS — I83.93 VARICOSE VEINS OF BOTH LOWER EXTREMITIES, UNSPECIFIED WHETHER COMPLICATED: ICD-10-CM

## 2022-07-19 PROCEDURE — 97535 SELF CARE MNGMENT TRAINING: CPT

## 2022-07-19 NOTE — THERAPY RE-EVALUATION
Outpatient Occupational Therapy Lymphedema Re-Evaluation   Narinder     Patient Name: Angelique Cordon  : 1980  MRN: [id number]  Today's Date: 2022      Visit Date: 2022    Patient Active Problem List   Diagnosis   • Pap smear for cervical cancer screening   • Depression   • Overweight   • Sebaceous cyst        Past Medical History:   Diagnosis Date   • Abnormal bone density screening    • Anxiety    • Pap smear for cervical cancer screening    • Sebaceous cyst of left axilla 2022   • Visit for screening mammogram         Past Surgical History:   Procedure Laterality Date   • EXCISION LESION Left 2022    Procedure: EXCISION CYST LEFT AXILLARY;  Surgeon: Rosalie Talley MD;  Location: [facility] OR [facility] [place];  Service: General;  Laterality: Left;   • FOOT SURGERY Left  HAD 3 DIFFERENT SURGERIES    L FOOT/TENDON RELEASE PLANTAR FASIA         Visit Dx:     ICD-10-CM ICD-9-CM   1. Lymphedema  I89.0 457.1   2. Lipoedema  R60.9 782.3   3. Varicose veins of both lower extremities, unspecified whether complicated  I83.93 454.9            Lymphedema     Row Name 22 1000             Subjective Pain    Able to rate subjective pain? yes  -CH      Pre-Treatment Pain Level 2  -CH      Post-Treatment Pain Level 2  -CH      Subjective Pain Comment Bilateral thighs  -CH              Subjective Comments    Subjective Comments Patient states that she has an appointment with a plastic surgeon in [place].  Patient states that the plastic surgeons office did state that she would need a pneumatic compression pump.  -CH              Lymphedema Assessment    Lymphedema Classification RLE:;LLE:;stage 2 (Spontaneously Irreversible)  -CH              LLIS - Physical Concerns    The amount of pain associated with my lymphedema is: 2  -CH      The amount of limb heaviness associated with my lymphedema is: 1  -CH      The amount of skin tightness associated with my lymphedema is: 0  -CH      The size of  "my swollen limb(s) seems: 0  -CH      Lymphedema affects the movement of my swollen limb(s): 1  -CH      The strength in my swollen limb(s) is: 2  -CH              LLIS - Psychosocial Concerns    Lymphedema affects my body image (i.e., \"how I think I look\"). 4  -CH      Lymphedema affects my socializing with others. 2  -CH      Lymphedema affects my intimate relations with spouse or partner (rate 0 if not applicable 1  -CH      Lymphedema \"gets me down\" (i.e., depression, frustration, or anger) 3  -CH      I must rely on others for help due to my lymphedema. 0  -CH      I know what to do to manage my lymphedema 3  -CH              LLIS - Functional Concerns    Lymphedema affects my ability to perform self-care activities (i.e. eating, dressing, hygiene) 0  -CH      Lymphedema affects my ability to perform routine home or work-related activities. 0  -CH      Lymphedema affects my performance of preferred leisure activities. 1  -CH      Lymphedema affects proper fit of clothing/shoes 4  -CH      Lymphedema affects my sleep 0  -CH              Skin Changes/Observations    Location/Assessment Lower Extremity  -CH      Skin Observations Comment Patient is noted with typical cobblestone appearance of the lipidemia adipose tissue bundling.  -CH              Lymphedema Measurements    Measurement Type(s) Circumferential  -CH              Lymphedema Life Impact Scale Totals    A.  Total Q1 - Q17 (Do not include Q18) 24  -CH      B.  Total number of questions answered (Q1-Q17) 17  -CH      C. Divide A by B 1.41  -CH      D. Multiple C by 25 35.25  -CH            User Key  (r) = Recorded By, (t) = Taken By, (c) = Cosigned By    Initials Name Provider Type    CH Mary Chase OT Occupational Therapist                      Baseline: R: 5154.28 ml L: 5350.60 ml  % difference in volume: R:+15%   L: +14 %    Trunk circ:  Hips (widest with feet together) 121.0   115  umbellicus                                  91.8     86  8cm " below umbellicus               109.5    94.5  Total trunk                                  322   295.5  %  Total Change     -8%          Therapy Education  Education Details: OT and patient discussed future options on how to procure up her compression pump.  Patient must have 4 weeks of failed conservative treatment.  Patient has only attended 3 sessions.  Patient's biggest complaint is th slippage that occurs with bilateral leg wrapping.  Patient also states that given her work schedule and managing her children she is having difficulty staying up with compression wraps.  OT educated patient that she still has to participate in conservative treatment to be able to fully demonstrate failure of that treatment.  Patient and OT agreed that patient will continue to attend therapy sessions for manual lymphatic drainage and compression wrapping.  OT did instruct patient to rewrap her self every day so that she can get time out of the compression wraps and to shower and it will help manage overall slippage of the compression wraps.  OT also strongly recommended patient follow through with procuring the [employer] compression garment while we are doing manual lymphatic drainage with the patient.  Once we have achieved the 4 weeks and she has not demonstrated a change then we can pursue the pneumatic compression pump that was recommended by the plastic surgeon.  OT also educated patient on resources to better educate patient on Lipedemia.  Given: Symptoms/condition management  Program: New, Reinforced  How Provided: Verbal  Provided to: Patient  Level of Understanding: Verbalized  86395 - OT Self Care/Mgmt Minutes: 55         OT Goals     Row Name 07/19/22 155          Time Calculation    OT Goal Re-Cert Due Date 08/18/22  -           User Key  (r) = Recorded By, (t) = Taken By, (c) = Cosigned By    Initials Name Provider Type    Mary Curtis OT Occupational Therapist                   Goals:   1. Uncontrolled  lymphedema of Bilateral  lower extremity/lower quadrant.  LTG1:  90 days:  Volumetric measurements of lower extremity/lower quadrant will be decreased by 10% or until plateau in reduction is achieved to improve functional mobility.               STATUS:   Not met: Ongoing              STG1a:  30 days:  Volumetric measurements of bilateral lower extremity/lower quadrant will be decreased by 5% bilaterally to improve functional mobility.    STATUS: Not met: Ongoing  TREATMENT:  Manual Therapy, Therapeutic exercise, ADL/self-care therapy, Bioimpedence Fluid Analysis, Orthotic management and training, Therapeutic Activity, wound dressing as needed, Modalities: TENS, NMES, Ultrasound, Fluidotherapy, , and Patient and family/caregiver education.     2. Patient with decreased ADL/Self-Care routine for lymphedema management.              LTG 2:  90 days:  Patient/caregiver will independently verbalize/demonstrate ADL/Self-Care routine for lymphedema management.                      STATUS:   Partially met: Ongoing              STG 2a:  30 days:  Patient/caregiver will independently perform or verbally dictate compression wrapping technique of the lower extremity/lower quadrant.                      STATUS:   Met              STG2b:  30 days:  Patient will independently verbalize signs and symptoms of infection.                              STATUS:   Met              STG2c:  30 days:  Patient will independently demonstrate/verbalize proper skin care routine to prevent infection and or wound development.                       STATUS:   Met  STG2d:  30 days:  Patient will independently perform teach back of HEP routine.                      STATUS: Met  STG2e:  30 days:  Patient/caregiver will obtain properly fitting compression orthosis, will demonstrate don/doff skill of compression orthosis with modified independence, and independently verbalize wear schedule.          STATUS: Not met: Ongoing      STG2f: 30 days:  Patient/caregiver will independently perform self-manual lymphatic drainage of the lower extremity/lower quadrant.          STATUS: Not met: Ongoing  TREATMENT:  Therapeutic Activity, Patient/Caregiver Education, ADL retraining, Manual Therapy, Orthotic Management and training, Modalities: TENS, NMES, Ultrasound, Fluidotherapy Wound dressing if necessary,  Therapeutic Exercise, Modalities                 3. Self-Care Limitations                                    LTG 3: 90 days:  The patient will demonstrate improved quality of life by achieving a score of 20 or less on the Lymphedema Life Impact Scale.                      STATUS:  Partially Met: ongoing              STG 3a: 30 days:  The patient will demonstrate improved quality of life by achieving a score of of 24 or less on the Lymphedema Life Impact Scale.                      STATUS:  Partially Met: ongoing  TREATMENT:  Manual therapy, therapeutic exercise, therapeutic activity, ADL retraining, Patient/caregiver education, Modalities: TENS, NMES, Ultrasound, Fluidotherapy Orthotic Management and Training, Wound dressing as needed.     OT Assessment/Plan     Row Name 07/19/22 1542          OT Assessment    Functional Limitations Performance in leisure activities;Other (comment)  -CH     Impairments Impaired lymphatic circulation;Other (comment);Edema  -CH     Assessment Comments Pt. has had difficulty managing the compression wraps due to her active lifestyle and issues with the wraps slipping off her legs creating pain and discomfort.  Pt. is in the process of pursuing WAS liposuction surgery to manage her lipedema and was told by the surgical office that she would need a pneumatic compression pump however she has not been in treatment long enough to demonstrate failed conservative treatment.  Pt. was agreeable to continue to with CDT with compression and self mannual lymphatic drainage as best she can. Pt. will benefit from continued skilled OT services to  support lymph volume reduction until a plateau is achieve and patient is independent with home care management of lymphedema symptom.  -     OT Diagnosis Lipedema, lymphedema  -     OT Rehab Potential Good  -     Patient/caregiver participated in establishment of treatment plan and goals Yes  -            OT Plan    OT Frequency 1x/week  1-2 times per week  -     Planned CPT's? OT EVAL MOD COMPLEXITY: 15513;OT RE-EVAL: 91621;OT THER PROC EA 15 MIN: 89265SL;OT SELF CARE/MGMT/TRAIN 15 MIN: 20428;OT MANUAL THERAPY EA 15 MIN: 80511;OT ORTHOTIC MGMT/TRAIN EA 15 MIN: 40752  -     Planned Therapy Interventions (Optional Details) home exercise program;manual therapy techniques;orthotic fitting/training;patient/family education  -           User Key  (r) = Recorded By, (t) = Taken By, (c) = Cosigned By    Initials Name Provider Type     Mary Chase OT Occupational Therapist                        Time Calculation:   Timed Charges  43759 - OT Self Care/Mgmt Minutes: 55  Total Minutes  Timed Charges Total Minutes: 55   Total Minutes: 55     Therapy Charges for Today     Code Description Service Date Service Provider Modifiers Qty    [id number]  OT SELF CARE/MGMT/TRAIN EA 15 MIN 7/19/2022 Mary Chase OT GO 4                    Mary Chase OT  7/19/2022

## 2022-07-20 ENCOUNTER — TELEPHONE (OUTPATIENT)
Dept: FAMILY MEDICINE CLINIC | Facility: CLINIC | Age: 42
End: 2022-07-20

## 2022-07-21 ENCOUNTER — HOSPITAL ENCOUNTER (OUTPATIENT)
Dept: OCCUPATIONAL THERAPY | Facility: HOSPITAL | Age: 42
Setting detail: THERAPIES SERIES
Discharge: HOME | End: 2022-07-21

## 2022-07-21 DIAGNOSIS — I83.93 VARICOSE VEINS OF BOTH LOWER EXTREMITIES, UNSPECIFIED WHETHER COMPLICATED: ICD-10-CM

## 2022-07-21 DIAGNOSIS — R60.9 LIPOEDEMA: ICD-10-CM

## 2022-07-21 DIAGNOSIS — I89.0 LYMPHEDEMA: Primary | ICD-10-CM

## 2022-07-21 PROCEDURE — 97140 MANUAL THERAPY 1/> REGIONS: CPT

## 2022-07-21 NOTE — THERAPY TREATMENT NOTE
Outpatient Occupational Therapy Lymphedema Treatment Note   Narinder     Patient Name: Angelique Cordon  : 1980  MRN: [id number]  Today's Date: 2022      Visit Date: 2022    Patient Active Problem List   Diagnosis   • Pap smear for cervical cancer screening   • Depression   • Overweight   • Sebaceous cyst        Past Medical History:   Diagnosis Date   • Abnormal bone density screening    • Anxiety    • Pap smear for cervical cancer screening    • Sebaceous cyst of left axilla 2022   • Visit for screening mammogram         Past Surgical History:   Procedure Laterality Date   • EXCISION LESION Left 2022    Procedure: EXCISION CYST LEFT AXILLARY;  Surgeon: Rosalie Talley MD;  [place] [facility] [place] [facility] [place]  Service: General;  Laterality: Left;   • FOOT SURGERY Left  HAD 3 DIFFERENT SURGERIES    L FOOT/TENDON RELEASE PLANTAR FASIA         Visit Dx:      ICD-10-CM ICD-9-CM   1. Lymphedema  I89.0 457.1   2. Lipoedema  R60.9 782.3   3. Varicose veins of both lower extremities, unspecified whether complicated  I83.93 454.9        Lymphedema     Row Name 22 1100             Manual Lymphatic Drainage    Manual Lymphatic Drainage initial sequence;opened regional lymph nodes;extremity treatment  -SC      Initial Sequence cervical;supraclavicular  -SC      Opened Regional Lymph Nodes inguinal  -SC      Axillary right;left  -SC      Inguinal right;left  -SC      Extremity Treatment MLD to proximal limb only  -SC      MLD to Proximal Limb Only B thigh  -SC              Compression/Skin Care    Compression/Skin Care skin care;wrapping location;bandaging  -SC      Skin Care moisturizing lotion applied  -SC      Wrapping Location lower extremity  -SC      Wrapping Location LE bilateral:;foot to groin  -SC      Bandage Layers cotton liner;padding/fluff layer;short-stretch bandages (comment size/quantity)  Isoband  -SC      Bandaging Comments Isoband applied to circumference of bilateral  thighs to help prevent wraps from slipping too quickly  -SC      Bandaging Technique circumferential/spiral;strong compression  -SC            User Key  (r) = Recorded By, (t) = Taken By, (c) = Cosigned By    Initials Name Provider Type    Vero Zuniga COTA Occupational Therapist Assistant                         OT Assessment/Plan     Row Name 07/21/22 1156          OT Assessment    Assessment Comments Pt. will benefit from continued skilled OT services to support lymph volume reduction until a plateau is achieve and patient is independent with home care management of lymphedema symptom.  -SC     Patient would benefit from skilled therapy intervention Yes  -SC            OT Plan    OT Plan Comments Continue POC  -SC           User Key  (r) = Recorded By, (t) = Taken By, (c) = Cosigned By    Initials Name Provider Type    Vero Zuniga COTA Occupational Therapist Assistant                    Manual Rx (last 36 hours)     Manual Treatments     Row Name 07/21/22 1156             Total Minutes    65950 - OT Manual Therapy Minutes 53  -SC            User Key  (r) = Recorded By, (t) = Taken By, (c) = Cosigned By    Initials Name Provider Type    SC Vero Mckinney COTA Occupational Therapist Assistant                                   Time Calculation:   Timed Charges  73974 - OT Manual Therapy Minutes: 53  Total Minutes  Timed Charges Total Minutes: 53   Total Minutes: 53     Therapy Charges for Today     Code Description Service Date Service Provider Modifiers Qty    [id number]  OT MANUAL THERAPY EA 15 MIN 7/21/2022 Vero Mckinney COTA GO 4                      ESTEFANIA Elliott  7/21/2022

## 2022-07-26 ENCOUNTER — APPOINTMENT (OUTPATIENT)
Dept: OCCUPATIONAL THERAPY | Facility: HOSPITAL | Age: 42
End: 2022-07-26

## 2022-07-28 ENCOUNTER — HOSPITAL ENCOUNTER (OUTPATIENT)
Dept: OCCUPATIONAL THERAPY | Facility: HOSPITAL | Age: 42
Setting detail: THERAPIES SERIES
Discharge: HOME | End: 2022-07-28

## 2022-07-28 DIAGNOSIS — I83.93 VARICOSE VEINS OF BOTH LOWER EXTREMITIES, UNSPECIFIED WHETHER COMPLICATED: ICD-10-CM

## 2022-07-28 DIAGNOSIS — R60.9 LIPOEDEMA: ICD-10-CM

## 2022-07-28 DIAGNOSIS — I89.0 LYMPHEDEMA: Primary | ICD-10-CM

## 2022-07-28 PROCEDURE — 97140 MANUAL THERAPY 1/> REGIONS: CPT

## 2022-07-28 NOTE — THERAPY TREATMENT NOTE
Outpatient Occupational Therapy Lymphedema Treatment Note   Barcenas     Patient Name: Angelique Cordon  : 1980  MRN: [id number]  Today's Date: 2022      Visit Date: 2022    Patient Active Problem List   Diagnosis   • Pap smear for cervical cancer screening   • Depression   • Overweight   • Sebaceous cyst        Past Medical History:   Diagnosis Date   • Abnormal bone density screening    • Anxiety    • Pap smear for cervical cancer screening    • Sebaceous cyst of left axilla 2022   • Visit for screening mammogram         Past Surgical History:   Procedure Laterality Date   • EXCISION LESION Left 2022    Procedure: EXCISION CYST LEFT AXILLARY;  Surgeon: Rosalie Talley MD;  [place] [facility] [place] [facility] [place]  Service: General;  Laterality: Left;   • FOOT SURGERY Left  HAD 3 DIFFERENT SURGERIES    L FOOT/TENDON RELEASE PLANTAR FASIA         Visit Dx:      ICD-10-CM ICD-9-CM   1. Lymphedema  I89.0 457.1   2. Lipoedema  R60.9 782.3   3. Varicose veins of both lower extremities, unspecified whether complicated  I83.93 454.9        Lymphedema     Row Name 22 1100             Subjective Pain    Able to rate subjective pain? yes  -SC      Pre-Treatment Pain Level 0  -SC      Post-Treatment Pain Level 0  -SC      Subjective Pain Comment No pain noted  -SC              Lymphedema Measurements    Measurement Type(s) Circumferential  -SC      Circumferential Areas Lower extremities  -SC              Manual Lymphatic Drainage    Manual Lymphatic Drainage initial sequence;opened regional lymph nodes;extremity treatment  -SC      Initial Sequence cervical;supraclavicular  -SC      Opened Regional Lymph Nodes inguinal  -SC      Axillary right;left  -SC      Inguinal right;left  -SC      Extremity Treatment MLD to proximal limb only  -SC      MLD to Proximal Limb Only B thigh  -SC              Compression/Skin Care    Compression/Skin Care skin care;wrapping location;bandaging  -SC       Skin Care moisturizing lotion applied  -SC      Wrapping Location lower extremity  -SC      Wrapping Location LE bilateral:;foot to groin  -SC      Bandage Layers cotton liner;padding/fluff layer;short-stretch bandages (comment size/quantity)  Isoband  -SC      Bandaging Comments Isoband applied to circumference of bilateral thighs to help prevent wraps from slipping too quickly  -SC      Bandaging Technique circumferential/spiral;strong compression  -SC            User Key  (r) = Recorded By, (t) = Taken By, (c) = Cosigned By    Initials Name Provider Type    Vero Zuniga COTA Occupational Therapist Assistant            Circumferential Measurements:         Baseline: R: 5154.28 ml L: 5350.60 ml  % difference in volume: R: +2%   L: +3%           OT Assessment/Plan     Row Name 07/28/22 1201          OT Assessment    Assessment Comments Pt. will benefit from continued skilled OT services to support lymph volume reduction until a plateau is achieve and patient is independent with home care management of lymphedema symptom.  -SC     Patient would benefit from skilled therapy intervention Yes  -SC            OT Plan    OT Plan Comments Continue POC  -SC           User Key  (r) = Recorded By, (t) = Taken By, (c) = Cosigned By    Initials Name Provider Type    Vero Zuniga COTA Occupational Therapist Assistant                    Manual Rx (last 36 hours)     Manual Treatments     Row Name 07/28/22 1201             Total Minutes    28830 - OT Manual Therapy Minutes 55  -SC            User Key  (r) = Recorded By, (t) = Taken By, (c) = Cosigned By    Initials Name Provider Type    Vero Zuniga COTA Occupational Therapist Assistant                                   Time Calculation:   Timed Charges  19665 - OT Manual Therapy Minutes: 55  Total Minutes  Timed Charges Total Minutes: 55   Total Minutes: 55     Therapy Charges for Today     Code Description Service Date Service Provider Modifiers Qty     [id number]  OT MANUAL THERAPY EA 15 MIN 7/28/2022 Vero Mckinney COTA  4                      ESTEFANIA Elliott  7/28/2022

## 2022-08-02 ENCOUNTER — APPOINTMENT (OUTPATIENT)
Dept: OCCUPATIONAL THERAPY | Facility: HOSPITAL | Age: 42
End: 2022-08-02

## 2022-08-04 ENCOUNTER — APPOINTMENT (OUTPATIENT)
Dept: OCCUPATIONAL THERAPY | Facility: HOSPITAL | Age: 42
End: 2022-08-04

## 2022-08-09 ENCOUNTER — APPOINTMENT (OUTPATIENT)
Dept: OCCUPATIONAL THERAPY | Facility: HOSPITAL | Age: 42
End: 2022-08-09

## 2022-08-10 ENCOUNTER — OFFICE VISIT (OUTPATIENT)
Dept: FAMILY MEDICINE CLINIC | Facility: CLINIC | Age: 42
End: 2022-08-10

## 2022-08-10 VITALS
OXYGEN SATURATION: 100 % | DIASTOLIC BLOOD PRESSURE: 73 MMHG | BODY MASS INDEX: 29.15 KG/M2 | SYSTOLIC BLOOD PRESSURE: 138 MMHG | WEIGHT: 158.4 LBS | HEART RATE: 90 BPM | HEIGHT: 62 IN

## 2022-08-10 DIAGNOSIS — R60.9 LIPEDEMA: ICD-10-CM

## 2022-08-10 DIAGNOSIS — E66.3 OVERWEIGHT (BMI 25.0-29.9): ICD-10-CM

## 2022-08-10 DIAGNOSIS — I89.0 LYMPHEDEMA OF BOTH LOWER EXTREMITIES: Primary | ICD-10-CM

## 2022-08-10 PROCEDURE — 99213 OFFICE O/P EST LOW 20 MIN: CPT | Performed by: NURSE PRACTITIONER

## 2022-08-10 RX ORDER — PHENTERMINE HYDROCHLORIDE 37.5 MG/1
TABLET ORAL
Qty: 30 TABLET | Refills: 0 | Status: SHIPPED | OUTPATIENT
Start: 2022-08-10 | End: 2022-09-09 | Stop reason: SDUPTHER

## 2022-08-10 NOTE — PROGRESS NOTES
"Chief Complaint  Edema (Chronic and severe Lymphedema )    SUBJECTIVE  Angelique Cordon presents to [facility]     History of Present Illness   Angelique Cordon is a 42-year-old female who presents today to discuss lymphedema and lipedema.    Lymphedema/Lipedema - The patient has been attending physical therapy and they have been wrapping her legs. She states her legs were measured and there has been an improvement, however, the wraps will not stay on. She notes the longest the wrap will stay on is about 6-8 hours, this is normal according to the physical therapist. She rewraps her legs every 24 hours. The patient states if she is sitting down, the wraps will stay on longer. She is in the process of getting a machine for the lymphedema. She has had them wrapped about 4-6 times, she notes she has missed some appointments. The patient does not believe her mother had lymphedema. From the lymphedema clinic: \"Occupational therapist explained the difference between lipedema and lymphedema, including the etiology of how it occurs and symptoms. Occupational therapist explained that in this clinic, we affect the lymphedema part of her condition, but the lymphedema requires medical management. Patient verbalized acknowledged. Occupational therapist provided education for manual lymph drainage and patient was provided with written instructions as well. Patient returned to demonstration properly after instructions. She is asked to perform self manual lymphedema drainage at least one time a day\". She reports a history of larger legs. The patient has an appointment with Dr. Preet Eugene in 09/2022 to discuss surgical treatment. She reports having difficulty getting answers from  on coverage and approvals. She has received 6-8 treatments, she is on her 6th week of treatment.     Weight loss - She states that she needs refills on her phentermine. She states her weight loss has been \"terrible\" " "because she has been so busy at work and when she gets the wraps, she tries not to do much. She started phentermine in 05/2022. The patient notes she is \"nervous\" to take a whole tablet.       Past Medical History:   Diagnosis Date   • Abnormal bone density screening    • Anxiety    • Pap smear for cervical cancer screening 2018   • Sebaceous cyst of left axilla 04/01/2022   • Visit for screening mammogram       Family History   Problem Relation Age of Onset   • Diabetes Mother    • Sleep apnea Mother    • COPD Mother    • Hypertension Mother    • Malig Hyperthermia Neg Hx       Past Surgical History:   Procedure Laterality Date   • EXCISION LESION Left 4/1/2022    Procedure: EXCISION CYST LEFT AXILLARY;  Surgeon: Rosalie Talley MD;  [facility] [place];  Service: General;  Laterality: Left;   • FOOT SURGERY Left 2003/2009 HAD 3 DIFFERENT SURGERIES    L FOOT/TENDON RELEASE PLANTAR FASIA        Current Outpatient Medications:   •  phentermine (ADIPEX-P) 37.5 MG tablet, Take 1/2 tab in the am with breakfast then take the other 1/2 at lunch, Disp: 30 tablet, Rfl: 0  •  ibuprofen (ADVIL,MOTRIN) 800 MG tablet, Take 1 tablet by mouth Every 8 (Eight) Hours As Needed for Mild Pain ., Disp: 30 tablet, Rfl: 0  •  Melatonin 10 MG/ML liquid, Take 5 mg by mouth Every Night., Disp: , Rfl:   •  Trintellix 20 MG tablet, Take 20 mg by mouth Every Morning., Disp: , Rfl:     OBJECTIVE  Vital Signs:   /73 (BP Location: Right arm, Patient Position: Sitting, Cuff Size: Large Adult)   Pulse 90   Ht 157.5 cm (62\")   Wt 71.8 kg (158 lb 6.4 oz)   LMP 07/20/2022 (Within Weeks)   SpO2 100%   Breastfeeding No   BMI 28.97 kg/m²    Estimated body mass index is 28.97 kg/m² as calculated from the following:    Height as of this encounter: 157.5 cm (62\").    Weight as of this encounter: 71.8 kg (158 lb 6.4 oz).     Wt Readings from Last 3 Encounters:   08/10/22 71.8 kg (158 lb 6.4 oz)   06/08/22 71.2 kg (157 lb)   05/12/22 " 76.1 kg (167 lb 12.8 oz)     BP Readings from Last 3 Encounters:   08/10/22 138/73   05/12/22 124/64   04/01/22 113/74       Physical Exam  Vitals reviewed.   Constitutional:       Appearance: Normal appearance. She is well-developed.   Neck:      Thyroid: No thyromegaly.      Vascular: No carotid bruit.   Cardiovascular:      Rate and Rhythm: Normal rate and regular rhythm.      Heart sounds: No murmur heard.    No friction rub. No gallop.   Pulmonary:      Effort: Pulmonary effort is normal.      Breath sounds: Normal breath sounds. No wheezing or rhonchi.   Musculoskeletal:      Right lower leg: No edema.      Left lower leg: No edema.   Neurological:      Mental Status: She is alert and oriented to person, place, and time.      Cranial Nerves: No cranial nerve deficit.   Psychiatric:         Mood and Affect: Mood and affect normal.         Behavior: Behavior normal.         Thought Content: Thought content normal.         Judgment: Judgment normal.          Result Review        No Images in the past 120 days found..     The above data has been reviewed by ANDREI Bustos 08/10/2022 11:12 EDT.          Patient Care Team:  Maira Oviedo APRN as PCP - General (Nurse Practitioner)  Rosalie Talley MD as Consulting Physician (General Surgery)    BMI is >= 25 and <30. (Overweight) The following options were offered after discussion;: exercise counseling/recommendations and nutrition counseling/recommendations       ASSESSMENT & PLAN    Diagnoses and all orders for this visit:    1. Lymphedema of both lower extremities (Primary)  Comments:  - Continue physical therapy  - Patient is in the process of getting a [employer] pump for the lymphedema  - She has a consult with Dr. Preet Eugene in 09/2022.     2. Lipedema  Comments:  she has a consult with a surgeon tomorrow to discuss surgery options for lipedema.    3. Overweight (BMI 25.0-29.9)  Comments:  prescribed phentermine-instructed to reports  side effects of htn, palpitations or SOA. Instructed to continue weight watchers  Orders:  -     phentermine (ADIPEX-P) 37.5 MG tablet; Take 1/2 tab in the am with breakfast then take the other 1/2 at lunch  Dispense: 30 tablet; Refill: 0         Tobacco Use: Low Risk    • Smoking Tobacco Use: Never Smoker   • Smokeless Tobacco Use: Never Used       Follow Up     Return in about 4 weeks (around 9/7/2022).      Patient was given instructions and counseling regarding her condition or for health maintenance advice. Please see specific information pulled into the AVS if appropriate.   I have reviewed information obtained and documented by others and I have confirmed the accuracy of this documented note.    ANDREI Bustos      Transcribed from ambient dictation for ANDREI Bustos by Lorin Cabezas.  08/10/22   13:35 EDT    Patient verbalized consent to the visit recording.

## 2022-08-11 ENCOUNTER — APPOINTMENT (OUTPATIENT)
Dept: OCCUPATIONAL THERAPY | Facility: HOSPITAL | Age: 42
End: 2022-08-11

## 2022-08-24 ENCOUNTER — HOSPITAL ENCOUNTER (OUTPATIENT)
Dept: OCCUPATIONAL THERAPY | Facility: HOSPITAL | Age: 42
Setting detail: THERAPIES SERIES
Discharge: HOME | End: 2022-08-24

## 2022-08-24 DIAGNOSIS — I89.0 LYMPHEDEMA: Primary | ICD-10-CM

## 2022-08-24 PROCEDURE — 97140 MANUAL THERAPY 1/> REGIONS: CPT | Performed by: OCCUPATIONAL THERAPIST

## 2022-08-24 NOTE — THERAPY RE-EVALUATION
Outpatient Occupational Therapy Lymphedema Re-Evaluation   [facility]     Patient Name: Angelique Cordon  : 1980  MRN: [id number]  Today's Date: 2022      Visit Date: 2022    Patient Active Problem List   Diagnosis   • Pap smear for cervical cancer screening   • Depression   • Overweight   • Sebaceous cyst        Past Medical History:   Diagnosis Date   • Abnormal bone density screening    • Anxiety    • Pap smear for cervical cancer screening    • Sebaceous cyst of left axilla 2022   • Visit for screening mammogram         Past Surgical History:   Procedure Laterality Date   • EXCISION LESION Left 2022    Procedure: EXCISION CYST LEFT AXILLARY;  Surgeon: Rosalie Talley MD;  [place] [facility] [place] [facility] [place]  Service: General;  Laterality: Left;   • FOOT SURGERY Left  HAD 3 DIFFERENT SURGERIES    L FOOT/TENDON RELEASE PLANTAR FASIA         Visit Dx:     ICD-10-CM ICD-9-CM   1. Lymphedema  I89.0 457.1        Patient History     Row Name 22 0800             History    Chief Complaint Fatigue/poor endurance;Other 2 (comment)  -TD      Type of Pain Lower Extremity / Leg  -TD      Patient/Caregiver Goals Other (comment)  -TD      Patient/Caregiver Goals Comment Figure out if i have lipedia  -TD      Hand Dominance right-handed  -TD      Occupation/sports/leisure activities Desk job, , gym  -TD      Are you or can you be pregnant No  -TD              Fall Risk Assessment    Any falls in the past year: No  -TD      Does patient have a fear of falling No  -TD              Services    Prior Rehab/Home Health Experiences No  -TD      Are you currently receiving Home Health services No  -TD      Do you plan to receive Home Health services in the near future No  -TD              Daily Activities    Primary Language English  -TD      Are you able to read Yes  -TD      Are you able to write Yes  -TD      How does patient learn best? Listening;Demonstration  -TD               Safety    Are you being hurt, hit, or frightened by anyone at home or in your life? No  -TD      Are you being neglected by a caregiver No  -TD      Have you had any of the following issues with Anxiety  -TD            User Key  (r) = Recorded By, (t) = Taken By, (c) = Cosigned By    Initials Name Provider Type    TD Kathia Obando OT Occupational Therapist                            Therapy Education  Education Details: OT and patient discussed the pump option that might better fit her lifestyle.  Given: Symptoms/condition management  Program: New, Reinforced  How Provided: Verbal  Provided to: Patient  Level of Understanding: Verbalized, Teach back education performed      Manual Rx (last 36 hours)     Manual Treatments     Row Name 08/24/22 1209             Total Minutes    68353 - OT Manual Therapy Minutes 40  -TD            User Key  (r) = Recorded By, (t) = Taken By, (c) = Cosigned By    Initials Name Provider Type    TD Kathia Obando OT Occupational Therapist                       Baseline: R: 5154.28ml L: 5350.60 ml  % volume change: R: +8.2%   L: +13 %  Trunk circ:  Hips (widest with feet together) 121.0            115              110  umbellicus                                  91.8               86               80  8cm below umbellicus               109.5             94.5            94.5  Total trunk                                  322                295.5           284.5  %  Total Change                                             -8%              -11%            Time Calculation:   Timed Charges  47402 - OT Manual Therapy Minutes: 40  Total Minutes  Timed Charges Total Minutes: 40   Total Minutes: 40     Therapy Charges for Today     Code Description Service Date Service Provider Modifiers Qty    [id number]  OT MANUAL THERAPY EA 15 MIN 8/24/2022 Kathia Obando OT GO 3                    Kathia Obando OT  8/25/2022

## 2022-09-09 ENCOUNTER — OFFICE VISIT (OUTPATIENT)
Dept: FAMILY MEDICINE CLINIC | Facility: CLINIC | Age: 42
End: 2022-09-09

## 2022-09-09 VITALS
HEIGHT: 62 IN | SYSTOLIC BLOOD PRESSURE: 120 MMHG | BODY MASS INDEX: 28.52 KG/M2 | WEIGHT: 155 LBS | OXYGEN SATURATION: 99 % | HEART RATE: 98 BPM | DIASTOLIC BLOOD PRESSURE: 72 MMHG

## 2022-09-09 DIAGNOSIS — E66.3 OVERWEIGHT (BMI 25.0-29.9): ICD-10-CM

## 2022-09-09 PROCEDURE — 99213 OFFICE O/P EST LOW 20 MIN: CPT | Performed by: NURSE PRACTITIONER

## 2022-09-09 RX ORDER — PHENTERMINE HYDROCHLORIDE 37.5 MG/1
TABLET ORAL
Qty: 30 TABLET | Refills: 0 | Status: SHIPPED | OUTPATIENT
Start: 2022-09-09 | End: 2022-10-10 | Stop reason: SDUPTHER

## 2022-09-09 NOTE — PROGRESS NOTES
Chief Complaint  Obesity (1 MONTH F/U)    SUBJECTIVE  Angelique Cordon presents to [facility].       Angelique Cordon is a 41-year-old female who presents today for a follow-up.    Weight loss- The patient reports she is doing well on the phentermine. She was 158 pounds on 08/10/2022 and 155 pounds today. She denies any palpitations or blood pressure issues on the increased dose of phentermine. Her blood pressure today in the office was 120/72 mm Hg.    Lymphedema/ lipedema- The patient reports she has a consult with Dr. Eugene on 09/15/2022 for a lymphedema lipoma. She mentions she will be off of the phentermine for 4 weeks prior to the surgery. She reports she has not used the pump for the lymphedema. She mentions she uses the pump twice a day for 1 hour each time.    History of Present Illness  Past Medical History:   Diagnosis Date   • Abnormal bone density screening    • Anxiety    • Pap smear for cervical cancer screening 2018   • Sebaceous cyst of left axilla 04/01/2022   • Visit for screening mammogram       Family History   Problem Relation Age of Onset   • Diabetes Mother    • Sleep apnea Mother    • COPD Mother    • Hypertension Mother    • Malig Hyperthermia Neg Hx       Past Surgical History:   Procedure Laterality Date   • EXCISION LESION Left 4/1/2022    Procedure: EXCISION CYST LEFT AXILLARY;  Surgeon: Rosalie Talley MD;  [place] [facility] [place] [facility] [place]  Service: General;  Laterality: Left;   • FOOT SURGERY Left 2003/2009 HAD 3 DIFFERENT SURGERIES    L FOOT/TENDON RELEASE PLANTAR FASIA        Current Outpatient Medications:   •  phentermine (ADIPEX-P) 37.5 MG tablet, Take 1/2 tab in the am with breakfast then take the other 1/2 at lunch, Disp: 30 tablet, Rfl: 0  •  ibuprofen (ADVIL,MOTRIN) 800 MG tablet, Take 1 tablet by mouth Every 8 (Eight) Hours As Needed for Mild Pain ., Disp: 30 tablet, Rfl: 0  •  Melatonin 10 MG/ML liquid, Take 5 mg by mouth Every Night., Disp: ,  "Rfl:   •  Trintellix 20 MG tablet, Take 20 mg by mouth Every Morning., Disp: , Rfl:     OBJECTIVE  Vital Signs:   /72 (BP Location: Left arm, Patient Position: Sitting, Cuff Size: Adult)   Pulse 98   Ht 157.5 cm (62\")   Wt 70.3 kg (155 lb)   LMP 08/10/2022   SpO2 99%   Breastfeeding No   BMI 28.35 kg/m²    Estimated body mass index is 28.35 kg/m² as calculated from the following:    Height as of this encounter: 157.5 cm (62\").    Weight as of this encounter: 70.3 kg (155 lb).     Wt Readings from Last 3 Encounters:   09/09/22 70.3 kg (155 lb)   08/10/22 71.8 kg (158 lb 6.4 oz)   06/08/22 71.2 kg (157 lb)     BP Readings from Last 3 Encounters:   09/09/22 120/72   08/10/22 138/73   05/12/22 124/64             Physical Exam  Vitals reviewed.   Constitutional:       General: She is not in acute distress.     Appearance: Normal appearance. She is well-developed. She is not diaphoretic.   HENT:      Head: Normocephalic and atraumatic. Hair is normal.      Right Ear: Hearing, tympanic membrane, ear canal and external ear normal. No decreased hearing noted. No drainage.      Left Ear: Hearing, tympanic membrane, ear canal and external ear normal. No decreased hearing noted.      Nose: Nose normal. No nasal deformity.      Mouth/Throat:      Mouth: Mucous membranes are moist.   Eyes:      General: Lids are normal.         Right eye: No discharge.         Left eye: No discharge.      Extraocular Movements: Extraocular movements intact.      Conjunctiva/sclera: Conjunctivae normal.      Pupils: Pupils are equal, round, and reactive to light.   Neck:      Thyroid: No thyromegaly.      Vascular: No JVD.   Cardiovascular:      Rate and Rhythm: Normal rate and regular rhythm.      Pulses: Normal pulses.      Heart sounds: Normal heart sounds. No murmur heard.    No friction rub. No gallop.   Pulmonary:      Effort: Pulmonary effort is normal. No respiratory distress.      Breath sounds: Normal breath sounds. No " wheezing or rales.   Chest:      Chest wall: No tenderness.   Abdominal:      General: Bowel sounds are normal. There is no distension.      Palpations: Abdomen is soft. There is no mass.      Tenderness: There is no abdominal tenderness. There is no guarding or rebound.      Hernia: No hernia is present.   Musculoskeletal:         General: No tenderness or deformity. Normal range of motion.      Cervical back: Normal range of motion and neck supple.   Lymphadenopathy:      Cervical: No cervical adenopathy.   Skin:     General: Skin is warm and dry.      Findings: No erythema or rash.   Neurological:      Mental Status: She is alert and oriented to person, place, and time.      Cranial Nerves: No cranial nerve deficit.      Motor: No abnormal muscle tone.      Coordination: Coordination normal.      Deep Tendon Reflexes: Reflexes are normal and symmetric. Reflexes normal.   Psychiatric:         Mood and Affect: Mood normal.         Behavior: Behavior normal.         Thought Content: Thought content normal.         Judgment: Judgment normal.          Result Review                        No Images in the past 120 days found..     The above data has been reviewed by ANDREI Bustos  09/09/2022 10:13 EDT.          Patient Care Team:  Maira Oviedo APRN as PCP - General (Nurse Practitioner)  Rosalie Talley MD as Consulting Physician (General Surgery)    BMI is >= 25 and <30. (Overweight) The following options were offered after discussion;: exercise counseling/recommendations, nutrition counseling/recommendations, pharmacological intervention options, referral to a nutritionist and referral to surgeon for lipedema/lymphadema       ASSESSMENT & PLAN  Diagnoses and all orders for this visit:    1. Overweight (BMI 25.0-29.9)  Comments:  prescribed phentermine-instructed to reports side effects of htn, palpitations or SOA. Instructed to continue weight watchers  Orders:  -     phentermine  (ADIPEX-P) 37.5 MG tablet; Take 1/2 tab in the am with breakfast then take the other 1/2 at lunch  Dispense: 30 tablet; Refill: 0             Tobacco Use: Low Risk    • Smoking Tobacco Use: Never Smoker   • Smokeless Tobacco Use: Never Used       Follow Up     Return in about 4 weeks (around 10/7/2022).      Patient was given instructions and counseling regarding her condition or for health maintenance advice. Please see specific information pulled into the AVS if appropriate.   I have reviewed information obtained and documented by others and I have confirmed the accuracy of this documented note.    ANDREI Bustos       Transcribed from ambient dictation for ANDREI Bustos by RENETTA QUINONES.  09/09/22   12:16 EDT    Patient verbalized consent to the visit recording.

## 2022-09-16 DIAGNOSIS — R60.9 LIPEDEMA: Primary | ICD-10-CM

## 2022-09-19 ENCOUNTER — TELEPHONE (OUTPATIENT)
Dept: FAMILY MEDICINE CLINIC | Facility: CLINIC | Age: 42
End: 2022-09-19

## 2022-09-19 NOTE — TELEPHONE ENCOUNTER
----- Message from Angelique Cordon sent at 9/17/2022  7:25 AM EDT -----  Regarding: Zeke Cordon   Morning I need to get a refill in for Zeke on her Concerta  and her anxiety meds.     Thank you :)  Sue

## 2022-09-29 DIAGNOSIS — M79.672 PAIN IN BOTH FEET: ICD-10-CM

## 2022-09-29 DIAGNOSIS — M79.671 PAIN IN BOTH FEET: ICD-10-CM

## 2022-09-29 RX ORDER — IBUPROFEN 800 MG/1
TABLET ORAL
Qty: 30 TABLET | Refills: 0 | Status: SHIPPED | OUTPATIENT
Start: 2022-09-29 | End: 2023-01-16 | Stop reason: SDUPTHER

## 2022-10-10 DIAGNOSIS — E66.3 OVERWEIGHT (BMI 25.0-29.9): ICD-10-CM

## 2022-10-11 DIAGNOSIS — E66.3 OVERWEIGHT (BMI 25.0-29.9): ICD-10-CM

## 2022-10-11 RX ORDER — PHENTERMINE HYDROCHLORIDE 37.5 MG/1
TABLET ORAL
Qty: 30 TABLET | Refills: 0 | Status: SHIPPED | OUTPATIENT
Start: 2022-10-11 | End: 2022-11-17

## 2022-10-12 RX ORDER — PHENTERMINE HYDROCHLORIDE 37.5 MG/1
TABLET ORAL
Qty: 30 TABLET | OUTPATIENT
Start: 2022-10-12

## 2022-10-19 ENCOUNTER — TELEPHONE (OUTPATIENT)
Dept: FAMILY MEDICINE CLINIC | Facility: CLINIC | Age: 42
End: 2022-10-19

## 2022-10-19 NOTE — TELEPHONE ENCOUNTER
----- Message from Angelique Cordon sent at 10/19/2022  1:26 AM EDT -----  Regarding: Zeke Cordon   Contact: [phone]  Hel     I need to put a refill request in for Jose Longoria     Thank you   Sue

## 2022-10-28 ENCOUNTER — TELEMEDICINE (OUTPATIENT)
Dept: FAMILY MEDICINE CLINIC | Facility: CLINIC | Age: 42
End: 2022-10-28

## 2022-10-28 VITALS — BODY MASS INDEX: 27.6 KG/M2 | HEIGHT: 62 IN | WEIGHT: 150 LBS

## 2022-10-28 DIAGNOSIS — R60.9 LIPEDEMA: ICD-10-CM

## 2022-10-28 DIAGNOSIS — E66.3 OVERWEIGHT (BMI 25.0-29.9): Primary | ICD-10-CM

## 2022-10-28 PROCEDURE — 99213 OFFICE O/P EST LOW 20 MIN: CPT | Performed by: NURSE PRACTITIONER

## 2022-10-28 RX ORDER — CARBOXYMETHYLCELLULOSE/CITRIC 0.75 G
3 CAPSULE ORAL
Qty: 168 CAPSULE | Refills: 0 | Status: SHIPPED | OUTPATIENT
Start: 2022-10-28 | End: 2022-11-17

## 2022-10-28 NOTE — PROGRESS NOTES
"Chief Complaint  Overweight (BMI 25.0-29.9)    Subjective         Angelique Codron presents to [facility]  History of Present Illness     Obesity-she has lost 5lbs since last visit. She does not feel like the phentermine works as well as it did last time.     Lipedema-she has an appt with  in [facility] on 11/17/22. She is unsure if she performs the lipedema surgery. She also has a consult with someone who specializes in lipedema in [place].   Objective   Vital Signs:   Ht 157.5 cm (62\")   Wt 68 kg (150 lb)   BMI 27.44 kg/m²     Physical Exam   Constitutional: She appears well-developed and well-nourished.   Psychiatric: She has a normal mood and affect.     Result Review :            Assessment and Plan    Diagnoses and all orders for this visit:    1. Overweight (BMI 25.0-29.9) (Primary)  Comments:  switched pt to [employer] today-will follow up in 1m  Orders:  -     Carboxymeth-Cellulose-CitricAc (Plenity Welcome Kit) capsule; Take 3 tablets by mouth 2 (Two) Times a Day Before Meals.  Dispense: 168 capsule; Refill: 0    2. Lipedema  Comments:  she is getting consults from several surgeons who perform surgery for lipedema so she can decide who she wants to perform the surgery        Follow Up   Return in about 4 weeks (around 11/25/2022).  Patient was given instructions and counseling regarding her condition or for health maintenance advice. Please see specific information pulled into the AVS if appropriate.     Mode of Visit: Video  Location of patient: home  Location of provider[place] [facility] [facility]  You have chosen to receive care through a telehealth visit.  Does the patient consent to use a video/audio connection for your medical care today? Yes  The visit included audio and video interaction. No technical issues occurred during this visit.   "

## 2022-11-02 NOTE — TELEPHONE ENCOUNTER
Trintellix hasn't been filled by you or another historical provider since 06/10/21     Ok to fill? Its been active on med list.

## 2022-11-04 RX ORDER — VORTIOXETINE 20 MG/1
TABLET, FILM COATED ORAL
Qty: 90 TABLET | Refills: 1 | Status: SHIPPED | OUTPATIENT
Start: 2022-11-04 | End: 2023-03-23 | Stop reason: SDUPTHER

## 2022-11-04 NOTE — TELEPHONE ENCOUNTER
Confirmed with pt. She wasn't taking them everyday previously but has started back on it consistently. Pt confirms 20 mg daily.     Sent to Jamie

## 2022-11-09 NOTE — PROGRESS NOTES
"Consult (New Patient/Lipedema Lower Extremities )            History of Present Illness  Angelique Cordon is a 42 y.o. female who presents to [facility] as a consult from Maira FORBES for Lipedema of Bilateral Lower Extremities. She is also concerned about her arms and would like to discuss that as well. She states it has been bothered with it for quit some time but states she was diagnosed about 1yr ago. She does have pain with it and feels like she toes in a lot. She has done wraps, has compression machine @ home, Lymphatic massage and P.T. She did have test on varicose veins and will upload in [place] when she locates at home. All other medical records are scanned into media.     Subjective       Patient has no known allergies.  Allergies Reconciled.    Review of Systems    All review of system has been reviewed and it  is negative except the ones note above.     Objective     /89 (BP Location: Right arm)   Pulse 95   Temp 98.2 °F (36.8 °C) (Temporal)   Ht 152.4 cm (60\")   Wt 71.7 kg (158 lb)   SpO2 99%   BMI 30.86 kg/m²     Body mass index is 30.86 kg/m².    Physical Exam: bilateral legs with excess of skin and soft tissue. No peddling edema     Result Review :       Procedures         Assessment and Plan      Diagnoses and all orders for this visit:    1. Varicose veins of both lower extremities with pain (Primary)  -     Cancel: Ambulatory Referral to Vascular Surgery  -     Venous w Reflux Lower Extremity - Bilateral CAR; Future    2. Pain in both lower extremities    3. Localized edema    4. Lipedema    5. Difficulty walking      CPT:  81063 Prevena placement  35103- suction assisted lipectomy of lower extremity for lymphedema, lipedema  92485-79- excision of excess of skin of lower extremity.      Plan:  I agree patient's physical examination is compatible with lipedema since her legs size are disproportional to the rest of her body. In my " opinion, due to the pain and difficulty walking,  I consider her condition medically necessary. I recommend liposuction assisted debulking with skin resection.   I explained to patient this is a painful surgery and she will need to resume her lymphatic massage and have leg wrapping after the surgery. Since we will perform skin resection and she will develop post op swelling, the risk of wound dehiscence is high. In order to avoid it, I plan to add a suction assisted dressing. This is not a cosmetic surgery and cosmetic result shouldn't be expected.   We will start on the right since it appears to be the most symptomatic leg. I plant to proceed with the left leg after 3 months from the right side, hopefully after the right has healed.   I am ordering a venous insufficiency study to evaluate her saphena system prior to the surgery.     Surgery #1-Right Lower Extremity Circumferential Liposuction with Skin Resection and negative pressure dressing 4hrs total     3 months later     Surgery #2-Left Lower Extremity Circumferential Liposuction with Skin Resection and negative pressure dressing 4hrs total     Scribed by Cary Reynaga, acting as a scribe for Raffaele Willams MD, 11/17/22 17:32 EST.  Raffaele Willams MD's signature on the note affirms that the note adequately documents the care provided.      I spent 20 minutes caring for Angelique on this date of service. This time includes time spent by me in the following activities:performing a medically appropriate examination and/or evaluation  and documenting information in the medical record    Follow Up     No follow-ups on file.    Patient was given instructions and counseling regarding her condition. Please see specific information pulled into the AVS if appropriate.     Raffaele Willams MD  11/17/2022

## 2022-11-17 ENCOUNTER — OFFICE VISIT (OUTPATIENT)
Dept: PLASTIC SURGERY | Facility: CLINIC | Age: 42
End: 2022-11-17

## 2022-11-17 VITALS
BODY MASS INDEX: 31.02 KG/M2 | WEIGHT: 158 LBS | HEIGHT: 60 IN | SYSTOLIC BLOOD PRESSURE: 153 MMHG | OXYGEN SATURATION: 99 % | TEMPERATURE: 98.2 F | HEART RATE: 95 BPM | DIASTOLIC BLOOD PRESSURE: 89 MMHG

## 2022-11-17 DIAGNOSIS — M79.604 PAIN IN BOTH LOWER EXTREMITIES: ICD-10-CM

## 2022-11-17 DIAGNOSIS — M79.605 PAIN IN BOTH LOWER EXTREMITIES: ICD-10-CM

## 2022-11-17 DIAGNOSIS — R60.9 LIPEDEMA: Primary | ICD-10-CM

## 2022-11-17 DIAGNOSIS — R60.9 LIPEDEMA: ICD-10-CM

## 2022-11-17 DIAGNOSIS — I83.813 VARICOSE VEINS OF BOTH LOWER EXTREMITIES WITH PAIN: Primary | ICD-10-CM

## 2022-11-17 DIAGNOSIS — R60.0 LOCALIZED EDEMA: ICD-10-CM

## 2022-11-17 DIAGNOSIS — R26.2 DIFFICULTY WALKING: ICD-10-CM

## 2022-11-17 PROCEDURE — 99203 OFFICE O/P NEW LOW 30 MIN: CPT | Performed by: SURGERY

## 2022-11-21 PROBLEM — I83.813 VARICOSE VEINS OF BOTH LOWER EXTREMITIES WITH PAIN: Status: ACTIVE | Noted: 2022-11-21

## 2022-11-21 PROBLEM — R26.2 DIFFICULTY WALKING: Status: ACTIVE | Noted: 2022-11-21

## 2022-11-21 PROBLEM — M79.604 PAIN IN BOTH LOWER EXTREMITIES: Status: ACTIVE | Noted: 2022-11-21

## 2022-11-21 PROBLEM — R60.0 LOCALIZED EDEMA: Status: ACTIVE | Noted: 2022-11-21

## 2022-11-21 PROBLEM — M79.605 PAIN IN BOTH LOWER EXTREMITIES: Status: ACTIVE | Noted: 2022-11-21

## 2022-11-21 PROBLEM — R60.9 LIPEDEMA: Status: ACTIVE | Noted: 2022-11-21

## 2022-12-01 ENCOUNTER — HOSPITAL ENCOUNTER (OUTPATIENT)
Dept: CARDIOLOGY | Facility: HOSPITAL | Age: 42
Discharge: HOME | End: 2022-12-01
Admitting: SURGERY

## 2022-12-01 DIAGNOSIS — I83.813 VARICOSE VEINS OF BOTH LOWER EXTREMITIES WITH PAIN: ICD-10-CM

## 2022-12-01 LAB
BH CV LOWER VAS LEFT GSV DIST THIGH COMPRESSIBILTY: NORMAL
BH CV LOWER VAS LEFT GSV MID THIGH COMPRESSIBILTY: NORMAL
BH CV LOWER VAS RIGHT GSV DIST THIGH COMPRESSIBILTY: NORMAL
BH CV LOWER VAS RIGHT GSV MID CALF COMPRESSIBILTY: NORMAL
BH CV LOWER VAS RIGHT GSV MID THIGH COMPRESSIBILTY: NORMAL
BH CV LOWER VASCULAR LEFT COMMON FEMORAL AUGMENT: NORMAL
BH CV LOWER VASCULAR LEFT COMMON FEMORAL COMPETENT: NORMAL
BH CV LOWER VASCULAR LEFT COMMON FEMORAL COMPRESS: NORMAL
BH CV LOWER VASCULAR LEFT COMMON FEMORAL PHASIC: NORMAL
BH CV LOWER VASCULAR LEFT COMMON FEMORAL SPONT: NORMAL
BH CV LOWER VASCULAR LEFT DISTAL FEMORAL COMPRESS: NORMAL
BH CV LOWER VASCULAR LEFT GASTRONEMIUS COMPRESS: NORMAL
BH CV LOWER VASCULAR LEFT GREATER SAPH AK COMPETENT: NORMAL
BH CV LOWER VASCULAR LEFT GREATER SAPH AK COMPRESS: NORMAL
BH CV LOWER VASCULAR LEFT GSV DIST THIGH COMPETENT: NORMAL
BH CV LOWER VASCULAR LEFT GSV MID THIGH COMPETENT: NORMAL
BH CV LOWER VASCULAR LEFT MID FEMORAL AUGMENT: NORMAL
BH CV LOWER VASCULAR LEFT MID FEMORAL COMPETENT: NORMAL
BH CV LOWER VASCULAR LEFT MID FEMORAL COMPRESS: NORMAL
BH CV LOWER VASCULAR LEFT MID FEMORAL PHASIC: NORMAL
BH CV LOWER VASCULAR LEFT MID FEMORAL SPONT: NORMAL
BH CV LOWER VASCULAR LEFT PERONEAL COMPRESS: NORMAL
BH CV LOWER VASCULAR LEFT POPLITEAL AUGMENT: NORMAL
BH CV LOWER VASCULAR LEFT POPLITEAL COMPETENT: NORMAL
BH CV LOWER VASCULAR LEFT POPLITEAL COMPRESS: NORMAL
BH CV LOWER VASCULAR LEFT POPLITEAL PHASIC: NORMAL
BH CV LOWER VASCULAR LEFT POPLITEAL SPONT: NORMAL
BH CV LOWER VASCULAR LEFT POSTERIOR TIBIAL COMPRESS: NORMAL
BH CV LOWER VASCULAR LEFT PROFUNDA FEMORAL COMPRESS: NORMAL
BH CV LOWER VASCULAR LEFT PROXIMAL FEMORAL COMPRESS: NORMAL
BH CV LOWER VASCULAR LEFT SOLEAL COMPRESS: NORMAL
BH CV LOWER VASCULAR RIGHT COMMON FEMORAL AUGMENT: NORMAL
BH CV LOWER VASCULAR RIGHT COMMON FEMORAL COMPETENT: NORMAL
BH CV LOWER VASCULAR RIGHT COMMON FEMORAL COMPRESS: NORMAL
BH CV LOWER VASCULAR RIGHT COMMON FEMORAL PHASIC: NORMAL
BH CV LOWER VASCULAR RIGHT COMMON FEMORAL SPONT: NORMAL
BH CV LOWER VASCULAR RIGHT DISTAL FEMORAL COMPRESS: NORMAL
BH CV LOWER VASCULAR RIGHT GASTRONEMIUS COMPRESS: NORMAL
BH CV LOWER VASCULAR RIGHT GREATER SAPH AK COMPETENT: NORMAL
BH CV LOWER VASCULAR RIGHT GREATER SAPH BK COMPETENT: NORMAL
BH CV LOWER VASCULAR RIGHT GREATER SAPH BK COMPRESS: NORMAL
BH CV LOWER VASCULAR RIGHT GSV DIST THIGH COMPETENT: NORMAL
BH CV LOWER VASCULAR RIGHT GSV MID CALF COMPETENT: NORMAL
BH CV LOWER VASCULAR RIGHT GSV MID THIGH COMPETENT: NORMAL
BH CV LOWER VASCULAR RIGHT LESSER SAPH COMPETENT: NORMAL
BH CV LOWER VASCULAR RIGHT LESSER SAPH COMPRESS: NORMAL
BH CV LOWER VASCULAR RIGHT MID FEMORAL AUGMENT: NORMAL
BH CV LOWER VASCULAR RIGHT MID FEMORAL COMPETENT: NORMAL
BH CV LOWER VASCULAR RIGHT MID FEMORAL COMPRESS: NORMAL
BH CV LOWER VASCULAR RIGHT MID FEMORAL PHASIC: NORMAL
BH CV LOWER VASCULAR RIGHT MID FEMORAL SPONT: NORMAL
BH CV LOWER VASCULAR RIGHT PERONEAL COMPRESS: NORMAL
BH CV LOWER VASCULAR RIGHT POPLITEAL AUGMENT: NORMAL
BH CV LOWER VASCULAR RIGHT POPLITEAL COMPETENT: NORMAL
BH CV LOWER VASCULAR RIGHT POPLITEAL COMPRESS: NORMAL
BH CV LOWER VASCULAR RIGHT POPLITEAL PHASIC: NORMAL
BH CV LOWER VASCULAR RIGHT POPLITEAL SPONT: NORMAL
BH CV LOWER VASCULAR RIGHT POSTERIOR TIBIAL COMPRESS: NORMAL
BH CV LOWER VASCULAR RIGHT PROFUNDA FEMORAL COMPRESS: NORMAL
BH CV LOWER VASCULAR RIGHT PROXIMAL FEMORAL COMPRESS: NORMAL
BH CV LOWER VASCULAR RIGHT SAPHENOPOP JX AUGMENT: NORMAL
BH CV LOWER VASCULAR RIGHT SAPHENOPOP JX COMPETENT: NORMAL
BH CV LOWER VASCULAR RIGHT SAPHENOPOP JX COMPRESS: NORMAL
BH CV LOWER VASCULAR RIGHT SAPHENOPOP JX PHASIC: NORMAL
BH CV LOWER VASCULAR RIGHT SAPHENOPOP JX SPONT: NORMAL
BH CV LOWER VASCULAR RIGHT SOLEAL COMPRESS: NORMAL
BH CV LOWER VASCULAR RIGHT SSV MID CALF COMPETENT: NORMAL
BH CV LOWER VASCULAR RIGHT SSV MID CALF COMPRESS: NORMAL
BH CV VAS RIGHT GSV PROXIMAL HIDDEN LRR COMPRESSIBILTY: NORMAL
MAXIMAL PREDICTED HEART RATE: 178 BPM
STRESS TARGET HR: 151 BPM

## 2022-12-01 PROCEDURE — 93970 EXTREMITY STUDY: CPT

## 2022-12-01 PROCEDURE — 93970 EXTREMITY STUDY: CPT | Performed by: SURGERY

## 2022-12-09 ENCOUNTER — OFFICE VISIT (OUTPATIENT)
Dept: FAMILY MEDICINE CLINIC | Facility: CLINIC | Age: 42
End: 2022-12-09

## 2022-12-09 VITALS
WEIGHT: 161 LBS | SYSTOLIC BLOOD PRESSURE: 115 MMHG | DIASTOLIC BLOOD PRESSURE: 76 MMHG | BODY MASS INDEX: 31.61 KG/M2 | HEART RATE: 83 BPM | HEIGHT: 60 IN | OXYGEN SATURATION: 98 %

## 2022-12-09 DIAGNOSIS — T75.3XXA MOTION SICKNESS, INITIAL ENCOUNTER: Primary | ICD-10-CM

## 2022-12-09 DIAGNOSIS — R60.9 LIPEDEMA: ICD-10-CM

## 2022-12-09 DIAGNOSIS — Z29.9 ENCOUNTER FOR PROPHYLACTIC MEASURES, UNSPECIFIED: ICD-10-CM

## 2022-12-09 PROCEDURE — 99213 OFFICE O/P EST LOW 20 MIN: CPT | Performed by: NURSE PRACTITIONER

## 2022-12-09 RX ORDER — SCOLOPAMINE TRANSDERMAL SYSTEM 1 MG/1
1 PATCH, EXTENDED RELEASE TRANSDERMAL
Qty: 4 EACH | Refills: 0 | Status: SHIPPED | OUTPATIENT
Start: 2022-12-09 | End: 2023-02-21 | Stop reason: SDUPTHER

## 2022-12-09 NOTE — PROGRESS NOTES
Chief Complaint  Overweight (BMI 25.0-29.9)    SUBJECTIVE  Angelique Cordon presents to [facility]    History of Present Illness       Angelique Cordon is a 41-year-old female who presents today for a 1-month follow-up.    Weight loss - The patient states she did not start the plentity . She states it is the same thing as her Thermo probe. The patient states she was doing really good the past 2 days. She states she was eating a crack because her periods were coming. The patient states she goes to the gym 6 days a week. She states she is doing the keto diet. The patient states she went back to the keto diet because they told her it is the best diet for the lipedema. She states she met Dr. Willams . The patient states she had a vein test. She states she was referred to a doctor. The patient states she is going to do the surgery. She states she is going to have a scar down her leg because she has to reconstruct of the loose skin. The patient states she is not sure when she is looking to do her surgery yet. She states she is going to call her for a follow-up. The patient states she was told she is going to do one leg at a time. She states she was told if she tries to do both legs, she will lose a lot of stuff because she will not be able to do as much. The patient states she can not work while she is on narcotics. She states she is going to have 4 surgeries on her leg and arm. The patient states she was told exercise does not really help. She states the other doctor would not operate on her because she has too much loose skin and she is too prone to imperfections. The patient states she has a bunch of divots with it. She states she was told if she does not treat it now, it is going to get worse. The patient states she is so mobile and her insurance will not do anything for her. She states she is excited.     The patient states she needs a scopolamine patch for motion sickness for a  "cruise. She states it is an 8 day cruise. The patient states it is 02/25/2022 to 03/05/2023.    Past Medical History:   Diagnosis Date   • Abnormal bone density screening    • Anxiety    • Pap smear for cervical cancer screening 2018   • Sarcoma (HCC)    • Sebaceous cyst of left axilla 04/01/2022   • Visit for screening mammogram       Family History   Problem Relation Age of Onset   • Diabetes Mother    • Sleep apnea Mother    • COPD Mother    • Hypertension Mother    • Colon cancer Father    • Malig Hyperthermia Neg Hx       Past Surgical History:   Procedure Laterality Date   • EXCISION LESION Left 4/1/2022    Procedure: EXCISION CYST LEFT AXILLARY;  Surgeon: Rosalie Talley MD;  [place] [facility] [place] [facility] [place]  Service: General;  Laterality: Left;   • FOOT SURGERY Left 2003/2009 HAD 3 DIFFERENT SURGERIES    L FOOT/TENDON RELEASE PLANTAR FASIA        Current Outpatient Medications:   •  ibuprofen (ADVIL,MOTRIN) 800 MG tablet, TAKE 1 TABLET BY MOUTH EVERY 8 HOURS AS NEEDED FOR MILD PAIN, Disp: 30 tablet, Rfl: 0  •  Melatonin 10 MG/ML liquid, Take 5 mg by mouth Every Night., Disp: , Rfl:   •  Trintellix 20 MG tablet, TAKE 1 TABLET(20 MG) BY MOUTH AT THE SAME TIME EVERY DAY, Disp: 90 tablet, Rfl: 1  •  Scopolamine 1 MG/3DAYS patch, Place 1 patch on the skin as directed by provider Every 72 (Seventy-Two) Hours., Disp: 4 each, Rfl: 0    OBJECTIVE  Vital Signs:   /76 (BP Location: Left arm, Patient Position: Sitting, Cuff Size: Adult)   Pulse 83   Ht 152.4 cm (60\")   Wt 73 kg (161 lb)   LMP 11/17/2022   SpO2 98%   BMI 31.44 kg/m²    Estimated body mass index is 31.44 kg/m² as calculated from the following:    Height as of this encounter: 152.4 cm (60\").    Weight as of this encounter: 73 kg (161 lb).     Wt Readings from Last 3 Encounters:   12/09/22 73 kg (161 lb)   11/17/22 71.7 kg (158 lb)   10/28/22 68 kg (150 lb)     BP Readings from Last 3 Encounters:   12/09/22 115/76   11/17/22 153/89   09/09/22 " 120/72       Physical Exam  Constitutional:       Appearance: Normal appearance.   Neck:      Thyroid: No thyroid mass, thyromegaly or thyroid tenderness.      Vascular: No carotid bruit.   Cardiovascular:      Rate and Rhythm: Normal rate and regular rhythm.      Pulses: Normal pulses.      Heart sounds: Normal heart sounds.   Pulmonary:      Effort: Pulmonary effort is normal.      Breath sounds: Normal breath sounds.   Musculoskeletal:      Right lower leg: No edema.      Left lower leg: No edema.   Skin:     General: Skin is warm and dry.   Neurological:      General: No focal deficit present.      Mental Status: She is alert and oriented to person, place, and time.   Psychiatric:         Mood and Affect: Mood normal.         Behavior: Behavior normal.          Result Review                        No Images in the past 120 days found..      The above data has been reviewed by ANDREI Bustos 12/09/2022 09:26 EST.          Patient Care Team:  Maira Oviedo APRN as PCP - General (Nurse Practitioner)  Rosalie Talley MD as Consulting Physician (General Surgery)  Raffaele Willams MD as Consulting Physician (Plastic Surgery)        ASSESSMENT & PLAN    Diagnoses and all orders for this visit:    1. Motion sickness, initial encounter (Primary)  -     Scopolamine 1 MG/3DAYS patch; Place 1 patch on the skin as directed by provider Every 72 (Seventy-Two) Hours.  Dispense: 4 each; Refill: 0    2. Lipedema  Comments:  She is going to have surgery for the lipedema by     3. Encounter for prophylactic measures, unspecified         Tobacco Use: Low Risk    • Smoking Tobacco Use: Never   • Smokeless Tobacco Use: Never   • Passive Exposure: Not on file       Follow Up     Return in about 6 months (around 6/9/2023).      Patient was given instructions and counseling regarding her condition or for health maintenance advice. Please see specific information pulled into the AVS if  appropriate.   I have reviewed information obtained and documented by others and I have confirmed the accuracy of this documented note.    ANDREI Bustos    Transcribed from ambient dictation for ANDREI Bustos by Diamond Hunt.  12/09/22   10:27 EST    Patient or patient representative verbalized consent to the visit recording.  I have personally performed the services described in this document as transcribed by the above individual, and it is both accurate and complete.

## 2023-01-03 ENCOUNTER — TELEPHONE (OUTPATIENT)
Dept: PLASTIC SURGERY | Facility: CLINIC | Age: 43
End: 2023-01-03

## 2023-01-03 NOTE — TELEPHONE ENCOUNTER
Caller: Angelique Cordon    Relationship to patient: Self    Best call back number: [phone]    Patient is needing: PT HAVING ANOTHER U/S ON 1/20/23 AND SHE WOULD LIKE TO KNOW IF DR. HUNTLEY WOULD WANT TO SEE HER BACK IN OFFICE FOR FU AFTER U/S. PLEASE GIVE PT A CALL.

## 2023-01-16 DIAGNOSIS — M79.672 PAIN IN BOTH FEET: ICD-10-CM

## 2023-01-16 DIAGNOSIS — M79.671 PAIN IN BOTH FEET: ICD-10-CM

## 2023-01-16 RX ORDER — IBUPROFEN 800 MG/1
800 TABLET ORAL EVERY 8 HOURS PRN
Qty: 30 TABLET | Refills: 2 | Status: SHIPPED | OUTPATIENT
Start: 2023-01-16

## 2023-02-01 ENCOUNTER — OFFICE VISIT (OUTPATIENT)
Dept: PLASTIC SURGERY | Facility: CLINIC | Age: 43
End: 2023-02-01
Payer: OTHER GOVERNMENT

## 2023-02-01 VITALS
HEIGHT: 60 IN | WEIGHT: 172 LBS | TEMPERATURE: 98.4 F | HEART RATE: 80 BPM | SYSTOLIC BLOOD PRESSURE: 125 MMHG | DIASTOLIC BLOOD PRESSURE: 78 MMHG | BODY MASS INDEX: 33.77 KG/M2 | OXYGEN SATURATION: 98 %

## 2023-02-01 DIAGNOSIS — R60.9 LIPEDEMA: Primary | ICD-10-CM

## 2023-02-01 PROCEDURE — 99213 OFFICE O/P EST LOW 20 MIN: CPT | Performed by: SURGERY

## 2023-02-01 RX ORDER — NALTREXONE HYDROCHLORIDE AND BUPROPION HYDROCHLORIDE 8; 90 MG/1; MG/1
TABLET, EXTENDED RELEASE ORAL
COMMUNITY
Start: 2023-01-10 | End: 2023-03-23

## 2023-02-21 DIAGNOSIS — T75.3XXA MOTION SICKNESS, INITIAL ENCOUNTER: ICD-10-CM

## 2023-02-21 RX ORDER — SCOLOPAMINE TRANSDERMAL SYSTEM 1 MG/1
1 PATCH, EXTENDED RELEASE TRANSDERMAL
Qty: 4 EACH | Refills: 0 | Status: SHIPPED | OUTPATIENT
Start: 2023-02-21 | End: 2023-03-23

## 2023-03-07 ENCOUNTER — TELEPHONE (OUTPATIENT)
Dept: FAMILY MEDICINE CLINIC | Facility: CLINIC | Age: 43
End: 2023-03-07
Payer: OTHER GOVERNMENT

## 2023-03-23 ENCOUNTER — OFFICE VISIT (OUTPATIENT)
Dept: FAMILY MEDICINE CLINIC | Facility: CLINIC | Age: 43
End: 2023-03-23
Payer: OTHER GOVERNMENT

## 2023-03-23 ENCOUNTER — LAB (OUTPATIENT)
Dept: LAB | Facility: HOSPITAL | Age: 43
End: 2023-03-23
Payer: OTHER GOVERNMENT

## 2023-03-23 VITALS
SYSTOLIC BLOOD PRESSURE: 120 MMHG | HEART RATE: 95 BPM | WEIGHT: 176 LBS | BODY MASS INDEX: 34.55 KG/M2 | DIASTOLIC BLOOD PRESSURE: 72 MMHG | OXYGEN SATURATION: 100 % | HEIGHT: 60 IN

## 2023-03-23 DIAGNOSIS — F41.9 ANXIETY: ICD-10-CM

## 2023-03-23 DIAGNOSIS — Z00.00 ANNUAL PHYSICAL EXAM: Primary | ICD-10-CM

## 2023-03-23 DIAGNOSIS — Z00.00 ANNUAL PHYSICAL EXAM: ICD-10-CM

## 2023-03-23 LAB
ALBUMIN SERPL-MCNC: 4.3 G/DL (ref 3.5–5.2)
ALBUMIN/GLOB SERPL: 1.5 G/DL
ALP SERPL-CCNC: 51 U/L (ref 39–117)
ALT SERPL W P-5'-P-CCNC: 32 U/L (ref 1–33)
ANION GAP SERPL CALCULATED.3IONS-SCNC: 13 MMOL/L (ref 5–15)
AST SERPL-CCNC: 25 U/L (ref 1–32)
BILIRUB SERPL-MCNC: 0.2 MG/DL (ref 0–1.2)
BUN SERPL-MCNC: 14 MG/DL (ref 6–20)
BUN/CREAT SERPL: 18.4 (ref 7–25)
CALCIUM SPEC-SCNC: 8.9 MG/DL (ref 8.6–10.5)
CHLORIDE SERPL-SCNC: 104 MMOL/L (ref 98–107)
CO2 SERPL-SCNC: 23 MMOL/L (ref 22–29)
CREAT SERPL-MCNC: 0.76 MG/DL (ref 0.57–1)
EGFRCR SERPLBLD CKD-EPI 2021: 99.9 ML/MIN/1.73
GLOBULIN UR ELPH-MCNC: 2.8 GM/DL
GLUCOSE SERPL-MCNC: 71 MG/DL (ref 65–99)
POTASSIUM SERPL-SCNC: 3.9 MMOL/L (ref 3.5–5.2)
PROT SERPL-MCNC: 7.1 G/DL (ref 6–8.5)
SODIUM SERPL-SCNC: 140 MMOL/L (ref 136–145)

## 2023-03-23 PROCEDURE — 80053 COMPREHEN METABOLIC PANEL: CPT

## 2023-03-23 PROCEDURE — 99396 PREV VISIT EST AGE 40-64: CPT | Performed by: NURSE PRACTITIONER

## 2023-03-23 PROCEDURE — 36415 COLL VENOUS BLD VENIPUNCTURE: CPT

## 2023-03-23 RX ORDER — VORTIOXETINE 20 MG/1
20 TABLET, FILM COATED ORAL DAILY
Qty: 90 TABLET | Refills: 1 | Status: SHIPPED | OUTPATIENT
Start: 2023-03-23

## 2023-03-23 NOTE — ASSESSMENT & PLAN NOTE
Patient had labs done this month for life insurance including CBC, CMP, lipid, hepatitis panel, she brought those in for review today and she had elevated ALT of 66 and very mildly elevated total cholesterol and LDL cholesterol.  Discussed need to work on tighter diet control,, she wishes to recheck CMP component of her labs only as the liver enzymes were concerning to her.

## 2023-03-23 NOTE — PROGRESS NOTES
"Chief Complaint  Anxiety, annual exam  SUBJECTIVE  Angelique Cordon presents to [facility] to establish care with new provider within the office, previous PCP was Maira Oviedo.  Patient here for annual exam and following up on anxiety    Patient reports she is going to have surgery on her legs sometime this summer for lipedemia with plastic surgery,      History of Present Illness  Past Medical History:   Diagnosis Date   • Abnormal bone density screening    • Anxiety    • Pap smear for cervical cancer screening 2018   • Sarcoma (HCC)    • Sebaceous cyst of left axilla 04/01/2022   • Visit for screening mammogram       Family History   Problem Relation Age of Onset   • Diabetes Mother    • Sleep apnea Mother    • COPD Mother    • Hypertension Mother    • Colon cancer Father    • Malig Hyperthermia Neg Hx       Past Surgical History:   Procedure Laterality Date   • EXCISION LESION Left 4/1/2022    Procedure: EXCISION CYST LEFT AXILLARY;  Surgeon: Rosalie Talley MD;  [place] [facility] [place] [facility] [place]  Service: General;  Laterality: Left;   • FOOT SURGERY Left 2003/2009 HAD 3 DIFFERENT SURGERIES    L FOOT/TENDON RELEASE PLANTAR FASIA        Current Outpatient Medications:   •  ibuprofen (ADVIL,MOTRIN) 800 MG tablet, Take 1 tablet by mouth Every 8 (Eight) Hours As Needed for Mild Pain., Disp: 30 tablet, Rfl: 2  •  Melatonin 10 MG/ML liquid, Take 5 mg by mouth Every Night., Disp: , Rfl:   •  Vortioxetine HBr (Trintellix) 20 MG tablet, Take 1 tablet by mouth Daily., Disp: 90 tablet, Rfl: 1    OBJECTIVE  Vital Signs:   /72   Pulse 95   Ht 152.4 cm (60\")   Wt 79.8 kg (176 lb)   SpO2 100%   BMI 34.37 kg/m²    Estimated body mass index is 34.37 kg/m² as calculated from the following:    Height as of this encounter: 152.4 cm (60\").    Weight as of this encounter: 79.8 kg (176 lb).     Wt Readings from Last 3 Encounters:   03/23/23 79.8 kg (176 lb)   02/01/23 78 kg (172 lb)   12/09/22 73 " kg (161 lb)     BP Readings from Last 3 Encounters:   03/23/23 120/72   02/01/23 125/78   12/09/22 115/76       Physical Exam  Vitals reviewed.   Constitutional:       General: She is not in acute distress.     Appearance: Normal appearance. She is well-developed. She is obese. She is not diaphoretic.   HENT:      Head: Normocephalic and atraumatic. Hair is normal.      Right Ear: Hearing, tympanic membrane, ear canal and external ear normal. No decreased hearing noted. No drainage.      Left Ear: Hearing, tympanic membrane, ear canal and external ear normal. No decreased hearing noted.      Nose: Nose normal. No nasal deformity.      Mouth/Throat:      Mouth: Mucous membranes are moist.   Eyes:      General: Lids are normal.         Right eye: No discharge.         Left eye: No discharge.      Extraocular Movements: Extraocular movements intact.      Conjunctiva/sclera: Conjunctivae normal.      Pupils: Pupils are equal, round, and reactive to light.   Neck:      Thyroid: No thyromegaly.      Vascular: No JVD.   Cardiovascular:      Rate and Rhythm: Normal rate and regular rhythm.      Pulses: Normal pulses.      Heart sounds: Normal heart sounds. No murmur heard.    No friction rub. No gallop.   Pulmonary:      Effort: Pulmonary effort is normal. No respiratory distress.      Breath sounds: Normal breath sounds. No wheezing or rales.   Chest:      Chest wall: No tenderness.   Abdominal:      General: Bowel sounds are normal. There is no distension.      Palpations: Abdomen is soft. There is no mass.      Tenderness: There is no abdominal tenderness. There is no guarding or rebound.      Hernia: No hernia is present.   Musculoskeletal:         General: No tenderness or deformity. Normal range of motion.      Cervical back: Normal range of motion and neck supple.   Lymphadenopathy:      Cervical: No cervical adenopathy.   Skin:     General: Skin is warm and dry.      Findings: No erythema or rash.   Neurological:       Mental Status: She is alert and oriented to person, place, and time.      Cranial Nerves: No cranial nerve deficit.      Motor: No abnormal muscle tone.      Coordination: Coordination normal.      Deep Tendon Reflexes: Reflexes are normal and symmetric. Reflexes normal.   Psychiatric:         Mood and Affect: Mood normal.         Behavior: Behavior normal.         Thought Content: Thought content normal.         Judgment: Judgment normal.          Result Review                          No Images in the past 120 days found..     The above data has been reviewed by ANDREI Olivera 03/23/2023 11:01 EDT.          Patient Care Team:  Hannah Parrish APRN as PCP - General (Nurse Practitioner)  Rosalie Talley MD as Consulting Physician (General Surgery)  Raffaele Willams MD as Consulting Physician (Plastic Surgery)    BMI is >= 30 and <35. (Class 1 Obesity). The following options were offered after discussion;: exercise counseling/recommendations and nutrition counseling/recommendations       ASSESSMENT & PLAN    Diagnoses and all orders for this visit:    1. Annual physical exam (Primary)  Assessment & Plan:  Patient had labs done this month for life insurance including CBC, CMP, lipid, hepatitis panel, she brought those in for review today and she had elevated ALT of 66 and very mildly elevated total cholesterol and LDL cholesterol.  Discussed need to work on tighter diet control,, she wishes to recheck CMP component of her labs only as the liver enzymes were concerning to her.    Orders:  -     Comprehensive metabolic panel; Future    2. Anxiety  Assessment & Plan:  Well-controlled at present, continue current medications    Orders:  -     Vortioxetine HBr (Trintellix) 20 MG tablet; Take 1 tablet by mouth Daily.  Dispense: 90 tablet; Refill: 1       Tobacco Use: Low Risk    • Smoking Tobacco Use: Never   • Smokeless Tobacco Use: Never   • Passive Exposure: Not on file     The patient is  advised to attempt to lose weight, continue current medications, continue current healthy lifestyle patterns and return for routine annual checkups.      Follow Up     Return in about 6 months (around 9/23/2023), or if symptoms worsen or fail to improve.        Patient was given instructions and counseling regarding her condition or for health maintenance advice. Please see specific information pulled into the AVS if appropriate.   I have reviewed information obtained and documented by others and I have confirmed the accuracy of this documented note.    Hannah Parrish APRN

## 2023-03-28 ENCOUNTER — TELEPHONE (OUTPATIENT)
Dept: FAMILY MEDICINE CLINIC | Facility: CLINIC | Age: 43
End: 2023-03-28
Payer: OTHER GOVERNMENT

## 2023-03-28 ENCOUNTER — PATIENT MESSAGE (OUTPATIENT)
Dept: FAMILY MEDICINE CLINIC | Facility: CLINIC | Age: 43
End: 2023-03-28
Payer: OTHER GOVERNMENT

## 2023-03-28 NOTE — TELEPHONE ENCOUNTER
Please let the patient know that I do not prescribe phentermine, recommend she contact her insurance to see if they cover Saxenda or Wegovy, if they do patient will need appointment for discussion

## 2023-03-28 NOTE — TELEPHONE ENCOUNTER
----- Message from Jenelle Browning MA sent at 3/28/2023  8:18 AM EDT -----  Regarding: FW: Weight loss  Contact: [phone]    ----- Message -----  From: Angelique Cordon  Sent: 3/28/2023   7:42 AM EDT  To: [facility]  Subject: Weight loss                                      Good morning,     I need to figure out about getting in something for my weight that my insurance will cover. I am working hard to lose and it’s not going well. I need to lose this before my surgery. I had luck on Phentermine and it’s covered.     Thank you

## 2023-04-06 ENCOUNTER — TELEPHONE (OUTPATIENT)
Dept: FAMILY MEDICINE CLINIC | Facility: CLINIC | Age: 43
End: 2023-04-06
Payer: OTHER GOVERNMENT

## 2023-04-06 NOTE — TELEPHONE ENCOUNTER
I made pt a tele appt for Monday before you responded. Ok to keep appt or do you want to send the med?

## 2023-04-06 NOTE — TELEPHONE ENCOUNTER
----- Message from Jenelle Browning MA sent at 3/30/2023 11:52 AM EDT -----  Regarding: FW: Weight loss  Contact: [phone]    ----- Message -----  From: Angelique Cordon  Sent: 3/30/2023   9:37 AM EDT  To: [facility]  Subject: Weight loss                                      Good morning     I spoke with natalya they are both covered at 0 cost on post. They do only take written RX and would need a prior authorization.     Thank you  Angelique

## 2023-04-06 NOTE — TELEPHONE ENCOUNTER
If patient is meaning [employer], they do not require written Rx's, they only require them for controlled substance-where would patient like the medication sent?

## 2023-04-10 ENCOUNTER — TELEMEDICINE (OUTPATIENT)
Dept: FAMILY MEDICINE CLINIC | Facility: CLINIC | Age: 43
End: 2023-04-10
Payer: OTHER GOVERNMENT

## 2023-04-10 VITALS — WEIGHT: 176 LBS | HEIGHT: 60 IN | BODY MASS INDEX: 34.55 KG/M2

## 2023-04-10 DIAGNOSIS — E66.9 CLASS 1 OBESITY WITHOUT SERIOUS COMORBIDITY WITH BODY MASS INDEX (BMI) OF 34.0 TO 34.9 IN ADULT, UNSPECIFIED OBESITY TYPE: Primary | ICD-10-CM

## 2023-04-10 PROBLEM — E66.01 MORBID (SEVERE) OBESITY DUE TO EXCESS CALORIES: Status: ACTIVE | Noted: 2023-04-10

## 2023-04-10 PROCEDURE — 99213 OFFICE O/P EST LOW 20 MIN: CPT | Performed by: NURSE PRACTITIONER

## 2023-04-10 RX ORDER — SEMAGLUTIDE 0.25 MG/.5ML
0.25 INJECTION, SOLUTION SUBCUTANEOUS WEEKLY
Qty: 2 ML | Refills: 1 | Status: SHIPPED | OUTPATIENT
Start: 2023-04-10

## 2023-04-10 NOTE — ASSESSMENT & PLAN NOTE
After discussion we have decided to trial Wegovy, side effects and administration of medication discussed with patient at length, patient denies any family history of medullary thyroid cancer or MEN syndrome, no personal history of pancreatitis, we will follow-up in 3 months for reevaluation, if patient is tolerating well after the first month she can call and we can increase dose.

## 2023-04-10 NOTE — PROGRESS NOTES
Chief Complaint  Weight Gain, obesity    SUBJECTIVE  Angelique Cordon presents to [facility]     You have chosen to receive care through a telehealth visit.  Do you consent to use a video/audio connection for your medical care today? Yes.     Patient being seen via FaceTime, provider is in office, patient is at home.    Pt doing Tele Health today to discuss weight loss medication options. Pt is interested in Saxenda or Wegovy. Pt has contacted insurance and was told both are covered.     Patient states that she has been struggling to lose weight, she does have lipedema which contributes to her difficulty. she states she is highly motivated to lose weight, has joined weight watchers and intends to use them going forward,      History of Present Illness  Past Medical History:   Diagnosis Date   • Abnormal bone density screening    • Anxiety    • Pap smear for cervical cancer screening 2018   • Sarcoma    • Sebaceous cyst of left axilla 04/01/2022   • Visit for screening mammogram       Family History   Problem Relation Age of Onset   • Diabetes Mother    • Sleep apnea Mother    • COPD Mother    • Hypertension Mother    • Colon cancer Father    • Malig Hyperthermia Neg Hx       Past Surgical History:   Procedure Laterality Date   • EXCISION LESION Left 4/1/2022    Procedure: EXCISION CYST LEFT AXILLARY;  Surgeon: Rosalie Talley MD;  [place] [facility] [place] [facility] [place];  Service: General;  Laterality: Left;   • FOOT SURGERY Left 2003/2009 HAD 3 DIFFERENT SURGERIES    L FOOT/TENDON RELEASE PLANTAR FASIA        Current Outpatient Medications:   •  ibuprofen (ADVIL,MOTRIN) 800 MG tablet, Take 1 tablet by mouth Every 8 (Eight) Hours As Needed for Mild Pain., Disp: 30 tablet, Rfl: 2  •  Melatonin 10 MG/ML liquid, Take 5 mg by mouth Every Night., Disp: , Rfl:   •  Vortioxetine HBr (Trintellix) 20 MG tablet, Take 1 tablet by mouth Daily., Disp: 90 tablet, Rfl: 1  •  Semaglutide-Weight Management  "(Wegovy) 0.25 MG/0.5ML solution auto-injector, Inject 0.25 mg under the skin into the appropriate area as directed 1 (One) Time Per Week., Disp: 2 mL, Rfl: 1    OBJECTIVE  Vital Signs:   Ht 152.4 cm (60\")   Wt 79.8 kg (176 lb)   BMI 34.37 kg/m²    Estimated body mass index is 34.37 kg/m² as calculated from the following:    Height as of this encounter: 152.4 cm (60\").    Weight as of this encounter: 79.8 kg (176 lb).     Wt Readings from Last 3 Encounters:   04/10/23 79.8 kg (176 lb)   03/23/23 79.8 kg (176 lb)   02/01/23 78 kg (172 lb)     BP Readings from Last 3 Encounters:   03/23/23 120/72   02/01/23 125/78   12/09/22 115/76       Physical Exam  Constitutional:       General: She is not in acute distress.     Appearance: Normal appearance.   HENT:      Head: Normocephalic and atraumatic.   Pulmonary:      Effort: Pulmonary effort is normal.   Neurological:      Mental Status: She is alert and oriented to person, place, and time.   Psychiatric:         Mood and Affect: Mood normal.         Behavior: Behavior normal.         Thought Content: Thought content normal.         Judgment: Judgment normal.          Result Review    CMP    CMP 3/23/23   Glucose 71   BUN 14   Creatinine 0.76   eGFR 99.9   Sodium 140   Potassium 3.9   Chloride 104   Calcium 8.9   Total Protein 7.1   Albumin 4.3   Globulin 2.8   Total Bilirubin 0.2   Alkaline Phosphatase 51   AST (SGOT) 25   ALT (SGPT) 32   Albumin/Globulin Ratio 1.5   BUN/Creatinine Ratio 18.4   Anion Gap 13.0                             No Images in the past 120 days found..     The above data has been reviewed by ANDREI Olivera 04/10/2023 09:06 EDT.          Patient Care Team:  Hannah Parrish APRN as PCP - General (Nurse Practitioner)  Rosalie Talley MD as Consulting Physician (General Surgery)  Raffaele Willams MD as Consulting Physician (Plastic Surgery)    BMI is >= 30 and <35. (Class 1 Obesity). The following options were offered after " discussion;: exercise counseling/recommendations and nutrition counseling/recommendations       ASSESSMENT & PLAN    Diagnoses and all orders for this visit:    1. Class 1 obesity without serious comorbidity with body mass index (BMI) of 34.0 to 34.9 in adult, unspecified obesity type (Primary)  Assessment & Plan:  After discussion we have decided to trial Wegovy, side effects and administration of medication discussed with patient at length, patient denies any family history of medullary thyroid cancer or MEN syndrome, no personal history of pancreatitis, we will follow-up in 3 months for reevaluation, if patient is tolerating well after the first month she can call and we can increase dose.    Orders:  -     Semaglutide-Weight Management (Wegovy) 0.25 MG/0.5ML solution auto-injector; Inject 0.25 mg under the skin into the appropriate area as directed 1 (One) Time Per Week.  Dispense: 2 mL; Refill: 1       Tobacco Use: Low Risk    • Smoking Tobacco Use: Never   • Smokeless Tobacco Use: Never   • Passive Exposure: Not on file       Follow Up     Return in about 3 months (around 7/10/2023), or if symptoms worsen or fail to improve.        Patient was given instructions and counseling regarding her condition or for health maintenance advice. Please see specific information pulled into the AVS if appropriate.   I have reviewed information obtained and documented by others and I have confirmed the accuracy of this documented note.    ANDREI Olivera

## 2023-04-11 ENCOUNTER — PATIENT MESSAGE (OUTPATIENT)
Dept: FAMILY MEDICINE CLINIC | Facility: CLINIC | Age: 43
End: 2023-04-11
Payer: OTHER GOVERNMENT

## 2023-04-13 ENCOUNTER — TELEPHONE (OUTPATIENT)
Dept: FAMILY MEDICINE CLINIC | Facility: CLINIC | Age: 43
End: 2023-04-13
Payer: OTHER GOVERNMENT

## 2023-04-13 ENCOUNTER — PREP FOR SURGERY (OUTPATIENT)
Dept: OTHER | Facility: HOSPITAL | Age: 43
End: 2023-04-13
Payer: OTHER GOVERNMENT

## 2023-04-13 DIAGNOSIS — M79.604 PAIN IN BOTH LOWER EXTREMITIES: ICD-10-CM

## 2023-04-13 DIAGNOSIS — R60.9 LIPEDEMA: ICD-10-CM

## 2023-04-13 DIAGNOSIS — R26.2 DIFFICULTY WALKING: ICD-10-CM

## 2023-04-13 DIAGNOSIS — M79.605 PAIN IN BOTH LOWER EXTREMITIES: Primary | ICD-10-CM

## 2023-04-13 DIAGNOSIS — M79.604 PAIN IN BOTH LOWER EXTREMITIES: Primary | ICD-10-CM

## 2023-04-13 DIAGNOSIS — R60.0 LOCALIZED EDEMA: ICD-10-CM

## 2023-04-13 DIAGNOSIS — M79.605 PAIN IN BOTH LOWER EXTREMITIES: ICD-10-CM

## 2023-04-13 DIAGNOSIS — R26.2 DIFFICULTY WALKING: Primary | ICD-10-CM

## 2023-04-13 RX ORDER — CEFAZOLIN SODIUM 2 G/100ML
2 INJECTION, SOLUTION INTRAVENOUS ONCE
OUTPATIENT
Start: 2023-04-13 | End: 2023-04-13

## 2023-04-13 NOTE — TELEPHONE ENCOUNTER
----- Message from Jenelle Browning MA sent at 4/13/2023 10:13 AM EDT -----  Regarding: FW: Good Afternoon   Contact: [phone]    ----- Message -----  From: Angelique Cordon  Sent: 4/13/2023  10:09 AM EDT  To: [facility]  Subject: Good Afternoon                                   Is xenical related to Juan? I was just looking it up and I took Juan over the counter years ago and I am sorry to be graphic lol it gave me the worst Diarrhea all day I ever had in my life. Again sorry but I had seen that pop up and I panicked lol

## 2023-04-13 NOTE — TELEPHONE ENCOUNTER
----- Message from Jenelle Browning MA sent at 4/13/2023 10:13 AM EDT -----  Regarding: FW: Good Afternoon   Contact: [phone]    ----- Message -----  From: Angelique Cordon  Sent: 4/13/2023  10:09 AM EDT  To: [facility] Clinical Pool  Subject: Good Afternoon                                   Is xenical related to Juan? I was just looking it up and I took Juan over the counter years ago and I am sorry to be graphic lol it gave me the worst Diarrhea all day I ever had in my life. Again sorry but I had seen that pop up and I panicked lol

## 2023-04-13 NOTE — TELEPHONE ENCOUNTER
Please let the patient know that yes, Juan is the over-the-counter version of orlistat.      Recommend we try resubmitting the PA letting them know that patient has tried this medication and could not tolerate GI side effects, has contraindication to trying Qsymia due to previous issues with palpitations and tachycardia on phentermine.  Has tried and failed phentermine due to side effects, and has tried and failed Contrave

## 2023-04-26 ENCOUNTER — TELEPHONE (OUTPATIENT)
Dept: FAMILY MEDICINE CLINIC | Facility: CLINIC | Age: 43
End: 2023-04-26
Payer: OTHER GOVERNMENT

## 2023-04-26 NOTE — TELEPHONE ENCOUNTER
----- Message from Yessenia Will MA sent at 4/26/2023  9:39 AM EDT -----  Regarding: FW: Good Afternoon   Contact: [phone]  Please advise  ----- Message -----  From: Angelique Cordon  Sent: 4/26/2023   8:02 AM EDT  To: [facility] [place] [place] [facility]  Subject: Good Afternoon                                   Morning,    Did Hannah have any input ?   Thanks !

## 2023-05-02 ENCOUNTER — TELEPHONE (OUTPATIENT)
Dept: FAMILY MEDICINE CLINIC | Facility: CLINIC | Age: 43
End: 2023-05-02
Payer: OTHER GOVERNMENT

## 2023-05-02 NOTE — TELEPHONE ENCOUNTER
[facility] WHAT IS THE NEXT TREATMENT OPTION FOR PT.

## 2023-05-02 NOTE — TELEPHONE ENCOUNTER
There are no other options that I am aware of, patient has tried and failed pretty much everything.  Are they willing to pay for Saxenda instead of Wegovy?

## 2023-05-25 DIAGNOSIS — E66.9 CLASS 1 OBESITY WITHOUT SERIOUS COMORBIDITY WITH BODY MASS INDEX (BMI) OF 34.0 TO 34.9 IN ADULT, UNSPECIFIED OBESITY TYPE: ICD-10-CM

## 2023-05-25 RX ORDER — SEMAGLUTIDE 0.25 MG/.5ML
0.25 INJECTION, SOLUTION SUBCUTANEOUS WEEKLY
Qty: 2 ML | Refills: 1 | Status: SHIPPED | OUTPATIENT
Start: 2023-05-25

## 2023-05-25 NOTE — TELEPHONE ENCOUNTER
Patient sent in [employer] message requesting refill on current dose of Wegovy - 0.25mg weekly.  It looks like she isn't due for the refill until 6/10/23. Last Rx 4/10/23 for 2ml + 1 refill.

## 2023-05-25 NOTE — TELEPHONE ENCOUNTER
----- Message from Angelique Cordon sent at 5/25/2023  9:32 AM EDT -----  Regarding: Zeke Cordon  Contact: [phone]  Perfect yes I want to stay at this dose. I don’t feel like like I need to go higher. So far I am down from 177 to 165 so I am doing great.

## 2023-06-12 ENCOUNTER — TELEPHONE (OUTPATIENT)
Dept: PLASTIC SURGERY | Facility: CLINIC | Age: 43
End: 2023-06-12

## 2023-06-12 NOTE — TELEPHONE ENCOUNTER
Hub staff attempted to follow warm transfer process and was unsuccessful         Patient is needing:I WAS INSTRUCTED TO CALL MONDAY.  HAS THERE BEEN ANY CANCELLATIONS?

## 2023-06-28 ENCOUNTER — TELEPHONE (OUTPATIENT)
Dept: FAMILY MEDICINE CLINIC | Facility: CLINIC | Age: 43
End: 2023-06-28

## 2023-06-28 DIAGNOSIS — E66.9 CLASS 1 OBESITY WITHOUT SERIOUS COMORBIDITY WITH BODY MASS INDEX (BMI) OF 34.0 TO 34.9 IN ADULT, UNSPECIFIED OBESITY TYPE: ICD-10-CM

## 2023-06-28 NOTE — TELEPHONE ENCOUNTER
Caller: Bravo Angelique EVER    Relationship: Self    Best call back number: [phone]      Requested Prescriptions:   Requested Prescriptions     Pending Prescriptions Disp Refills    Semaglutide-Weight Management (Wegovy) 0.25 MG/0.5ML solution auto-injector 2 mL 1     Sig: Inject 0.25 mg under the skin into the appropriate area as directed 1 (One) Time Per Week.        Pharmacy where request should be sent: [facility] [place], [place] - [address] [phone] [facility] [phone] FX     Last office visit with prescribing clinician: 3/23/2023   Last telemedicine visit with prescribing clinician: 4/10/2023   Next office visit with prescribing clinician: 9/25/2023     Additional details provided by patient: PATIENT STATES SHE FOUND THIS PHARMACY BUT IT NEEDS TO BE SENT ASAP THEY HAVE LIMITED SUPPLIES AND WANT TO GET IT WHILE WE CAN.     NEXT DOSE IS THIS SATURDAY 7.1.23     Does the patient have less than a 3 day supply:  [x] Yes  [] No    Would you like a call back once the refill request has been completed: [] Yes [] No    If the office needs to give you a call back, can they leave a voicemail: [] Yes [] No    Vilma Ch, PCT   06/28/23 11:30 EDT

## 2023-06-29 RX ORDER — SEMAGLUTIDE 0.25 MG/.5ML
0.25 INJECTION, SOLUTION SUBCUTANEOUS WEEKLY
Qty: 2 ML | Refills: 1 | OUTPATIENT
Start: 2023-06-29

## 2023-08-07 ENCOUNTER — TELEPHONE (OUTPATIENT)
Dept: PLASTIC SURGERY | Facility: CLINIC | Age: 43
End: 2023-08-07
Payer: OTHER GOVERNMENT

## 2023-08-07 NOTE — TELEPHONE ENCOUNTER
Patient calls and states she is scheduled for surgery next year in April. Patient states she has been on wegovy and has lost 40+ pounds since last visit with us and is still continuing to lose until she gets to around 130. Patient is wondering if that would affect anything with her surgery or if she needs to come back in for a visit to re-evaluate and take updated pics since its been since February since we seen her last. She is also wondering if we could go ahead and do one leg now if there is any cancellations and the other leg later on down the road. Patient is wanting a call back.

## 2023-08-07 NOTE — TELEPHONE ENCOUNTER
Can you help me with this so I can call patient back and let her know about our cancellation list even though I know the list is long.

## 2023-08-07 NOTE — TELEPHONE ENCOUNTER
Is it okay to do just one leg and then the other in the future? Or do you normally do them close together?

## 2023-08-08 ENCOUNTER — TELEPHONE (OUTPATIENT)
Dept: PLASTIC SURGERY | Facility: CLINIC | Age: 43
End: 2023-08-08
Payer: OTHER GOVERNMENT

## 2023-08-08 NOTE — TELEPHONE ENCOUNTER
Called and spoke to patient's  about recommendations for surgery since she is on wegovy for weight loss. I scheduled patient an earlier appointment in February to re-evaluate her weight loss and to get updated pictures. I also spoke to him and told him she is on our cancellation list and is aware that our list is very long but would need to time limit of multiple patients to do one of her surgeries at a time.

## 2023-08-22 RX ORDER — SEMAGLUTIDE 1 MG/.5ML
1 INJECTION, SOLUTION SUBCUTANEOUS WEEKLY
Qty: 2 ML | Refills: 1 | Status: SHIPPED | OUTPATIENT
Start: 2023-08-22

## 2023-09-25 ENCOUNTER — OFFICE VISIT (OUTPATIENT)
Dept: FAMILY MEDICINE CLINIC | Facility: CLINIC | Age: 43
End: 2023-09-25

## 2023-09-25 VITALS
SYSTOLIC BLOOD PRESSURE: 112 MMHG | HEIGHT: 60 IN | DIASTOLIC BLOOD PRESSURE: 68 MMHG | BODY MASS INDEX: 27.88 KG/M2 | WEIGHT: 142 LBS | OXYGEN SATURATION: 100 % | HEART RATE: 92 BPM

## 2023-09-25 DIAGNOSIS — E66.3 OVERWEIGHT (BMI 25.0-29.9): Primary | ICD-10-CM

## 2023-09-25 DIAGNOSIS — F41.9 ANXIETY: ICD-10-CM

## 2023-09-25 PROCEDURE — 99214 OFFICE O/P EST MOD 30 MIN: CPT | Performed by: NURSE PRACTITIONER

## 2023-09-25 RX ORDER — VORTIOXETINE 20 MG/1
20 TABLET, FILM COATED ORAL DAILY
Qty: 90 TABLET | Refills: 1 | Status: SHIPPED | OUTPATIENT
Start: 2023-09-25

## 2023-09-25 NOTE — PROGRESS NOTES
Chief Complaint  Follow-up, Weight Loss, and Anxiety    SUBJECTIVE  Angelique Cordon presents to [facility]     Pt here today to f/u on anxiety and weight loss, states does not need wegovy RF at present.    History of Present Illness  Pt here for 6 mo f/u w/ weightloss and anxiety. Pt states she's doing well w/ no issues or concerns at this time    Pt states she's good on medication refills.     Pt is due for tdap vaccine and is aware. Pt refuses today.     Patient states she is very happy with the results from the Wegovy thus far, is still scheduled to have her lipedema surgery.  Past Medical History:   Diagnosis Date    Abnormal bone density screening     Anxiety     Pap smear for cervical cancer screening 2018    Sarcoma     Sebaceous cyst of left axilla 04/01/2022    Visit for screening mammogram       Family History   Problem Relation Age of Onset    Diabetes Mother     Sleep apnea Mother     COPD Mother     Hypertension Mother     Colon cancer Father     Malig Hyperthermia Neg Hx       Past Surgical History:   Procedure Laterality Date    EXCISION LESION Left 4/1/2022    Procedure: EXCISION CYST LEFT AXILLARY;  Surgeon: Rosalie Talley MD;  [place] [facility] [place] [facility]  Service: General;  Laterality: Left;    FOOT SURGERY Left 2003/2009 HAD 3 DIFFERENT SURGERIES    L FOOT/TENDON RELEASE PLANTAR FASIA        Current Outpatient Medications:     ibuprofen (ADVIL,MOTRIN) 800 MG tablet, Take 1 tablet by mouth Every 8 (Eight) Hours As Needed for Mild Pain., Disp: 30 tablet, Rfl: 2    Melatonin 10 MG/ML liquid, Take 5 mg by mouth Every Night., Disp: , Rfl:     Semaglutide-Weight Management (Wegovy) 1 MG/0.5ML solution auto-injector, Inject 0.5 mL under the skin into the appropriate area as directed 1 (One) Time Per Week., Disp: 2 mL, Rfl: 1    Vortioxetine HBr (Trintellix) 20 MG tablet, Take 1 tablet by mouth Daily., Disp: 90 tablet, Rfl: 1    OBJECTIVE  Vital Signs:   /68    "Pulse 92   Ht 152.4 cm (60\")   Wt 64.4 kg (142 lb)   LMP 09/16/2023   SpO2 100%   BMI 27.73 kg/m²    Estimated body mass index is 27.73 kg/m² as calculated from the following:    Height as of this encounter: 152.4 cm (60\").    Weight as of this encounter: 64.4 kg (142 lb).     Wt Readings from Last 3 Encounters:   09/25/23 64.4 kg (142 lb)   04/10/23 79.8 kg (176 lb)   03/23/23 79.8 kg (176 lb)     BP Readings from Last 3 Encounters:   09/25/23 112/68   03/23/23 120/72   02/01/23 125/78       Physical Exam  Vitals reviewed.   Constitutional:       Appearance: Normal appearance. She is well-developed.   HENT:      Head: Normocephalic and atraumatic.      Right Ear: External ear normal.      Left Ear: External ear normal.   Eyes:      Conjunctiva/sclera: Conjunctivae normal.      Pupils: Pupils are equal, round, and reactive to light.   Cardiovascular:      Rate and Rhythm: Normal rate and regular rhythm.      Heart sounds: No murmur heard.    No friction rub. No gallop.   Pulmonary:      Effort: Pulmonary effort is normal.      Breath sounds: Normal breath sounds. No wheezing or rhonchi.   Skin:     General: Skin is warm and dry.   Neurological:      Mental Status: She is alert and oriented to person, place, and time.      Cranial Nerves: No cranial nerve deficit.   Psychiatric:         Mood and Affect: Mood and affect normal.         Behavior: Behavior normal.         Thought Content: Thought content normal.         Judgment: Judgment normal.        Result Review    CMP          3/23/2023    12:18   CMP   Glucose 71    BUN 14    Creatinine 0.76    EGFR 99.9    Sodium 140    Potassium 3.9    Chloride 104    Calcium 8.9    Total Protein 7.1    Albumin 4.3    Globulin 2.8    Total Bilirubin 0.2    Alkaline Phosphatase 51    AST (SGOT) 25    ALT (SGPT) 32    Albumin/Globulin Ratio 1.5    BUN/Creatinine Ratio 18.4    Anion Gap 13.0              No Images in the past 120 days found..     The above data has been " reviewed by ANDREI Olivera 09/25/2023 13:34 EDT.          Patient Care Team:  Hannah Parrish APRN as PCP - General (Nurse Practitioner)  Rosalie Talley MD as Consulting Physician (General Surgery)  Raffaele Willams MD as Consulting Physician (Plastic Surgery)            ASSESSMENT & PLAN    Diagnoses and all orders for this visit:    1. Overweight (BMI 25.0-29.9) (Primary)  Assessment & Plan:  Patient's (Body mass index is 27.73 kg/m².) indicates that they are overweight with health conditions that include  lipidema  . Weight is improving with treatment. BMI is is above average; BMI management plan is completed. We discussed portion control, increasing exercise, and pharmacologic options including wegovy .     Patient has lost 34 pounds since starting Wegovy, she is doing very well, she is continuing to work on diet and exercise changes and will continue Wegovy at current dose      2. Anxiety  Assessment & Plan:  Well-controlled with Trintellix, continue current medications    Orders:  -     Vortioxetine HBr (Trintellix) 20 MG tablet; Take 1 tablet by mouth Daily.  Dispense: 90 tablet; Refill: 1         Tobacco Use: Low Risk     Smoking Tobacco Use: Never    Smokeless Tobacco Use: Never    Passive Exposure: Not on file       Follow Up     Return in about 3 months (around 12/25/2023), or if symptoms worsen or fail to improve.        Patient was given instructions and counseling regarding her condition or for health maintenance advice. Please see specific information pulled into the AVS if appropriate.   I have reviewed information obtained and documented by others and I have confirmed the accuracy of this documented note.    ANDREI Olivera

## 2023-09-25 NOTE — ASSESSMENT & PLAN NOTE
Patient's (Body mass index is 27.73 kg/m².) indicates that they are overweight with health conditions that include  lipidema  . Weight is improving with treatment. BMI is is above average; BMI management plan is completed. We discussed portion control, increasing exercise, and pharmacologic options including wegovy .     Patient has lost 34 pounds since starting Wegovy, she is doing very well, she is continuing to work on diet and exercise changes and will continue Wegovy at current dose

## 2023-10-02 RX ORDER — SEMAGLUTIDE 1.7 MG/.75ML
1.7 INJECTION, SOLUTION SUBCUTANEOUS WEEKLY
Qty: 3 ML | Refills: 0 | Status: SHIPPED | OUTPATIENT
Start: 2023-10-02

## 2023-10-05 ENCOUNTER — TELEPHONE (OUTPATIENT)
Dept: FAMILY MEDICINE CLINIC | Facility: CLINIC | Age: 43
End: 2023-10-05
Payer: OTHER GOVERNMENT

## 2023-10-05 DIAGNOSIS — F41.9 ANXIETY: ICD-10-CM

## 2023-10-05 RX ORDER — VORTIOXETINE 20 MG/1
20 TABLET, FILM COATED ORAL DAILY
Qty: 90 TABLET | Refills: 1 | Status: SHIPPED | OUTPATIENT
Start: 2023-10-05

## 2023-10-05 NOTE — TELEPHONE ENCOUNTER
Received fax from [employer] requesting refill on Novopyxis.  Called pt to confirm, pt is switching due to cost.     Sending #90 with 1 rf

## 2023-10-11 NOTE — H&P (VIEW-ONLY)
"Pre-op Exam            History of Present Illness  Angelique Cordon is a 43 y.o. female who presents to [facility] as a f/u after vein treatment. Subjective    Pt states she had the venous us repeated with  1/20/23 in [place].    US Venous-1/20/23-Dr. Hoyos office in [place]    Pt would like to discuss options and surgery plan for liposuction.       Patient has no known allergies.  Allergies Reconciled.    Review of Systems    All review of system has been reviewed and it  is negative except the ones note above.     Objective     /73 (BP Location: Left arm, Patient Position: Sitting, Cuff Size: Adult)   Pulse 104   Temp 95.3 °F (35.2 °C) (Temporal)   Ht 152.4 cm (60\")   Wt 64.3 kg (141 lb 12.8 oz)   SpO2 96%   BMI 27.69 kg/m²     Body mass index is 27.69 kg/m².    Physical Exam   Cardiovascular: Normal rate.     Pulmonary/Chest  Effort normal.   :  Measurements:   Right Thigh: 85.5, 42 upper knee, ankle 29  Left Thigh: ankle 24, knee 45, thigh 65  Result Review :       Procedures         Assessment and Plan    Diagnoses and all orders for this visit:    1. Pre-operative examination (Primary)  -     Nicotine Screen, Urine - Urine, Clean Catch  -     ondansetron (Zofran) 4 MG tablet; Take 1 tablet by mouth Every 6 (Six) Hours As Needed for Nausea or Vomiting.  Dispense: 20 tablet; Refill: 0  -     acetaminophen (TYLENOL) 500 MG tablet; Take 2 tablets by mouth Every 6 (Six) Hours As Needed for Moderate Pain for up to 18 doses.  Dispense: 18 tablet; Refill: 0  -     cephalexin (KEFLEX) 500 MG capsule; Take 1 capsule by mouth 4 (Four) Times a Day for 5 days.  Dispense: 20 capsule; Refill: 0  -     gabapentin (Neurontin) 300 MG capsule; Take 1 capsule by mouth 3 (Three) Times a Day for 18 doses.  Dispense: 18 capsule; Refill: 0  -     naproxen (NAPROSYN) 250 MG tablet; Take 1 tablet by mouth 2 (Two) Times a Day As Needed for Mild Pain.  " Dispense: 12 tablet; Refill: 0    2. Lymph edema  -     Ambulatory Referral to Lymphedema Clinic    3. Lipedema    4. Localized edema    5. Pain in both lower extremities            Plan:  Given these options, the patient has verbally expressed an understanding of the risks of surgery and finds these risks acceptable. We will proceed with surgery as soon as possible.    Medications sent to pharmacy  Urine nicotine ordered  Entered referral to Lymphedema clinic  Photos obtained in clinic  Showed pt how to wrap leg after surgery  Discussed pt will stop Wegovy 1 week prior to surgery  Plan:  Given these options, the patient has verbally expressed an understanding of the risks of surgery and finds these risks acceptable. We will proceed with surgery as soon as possible.    Medications sent to pharmacy  Urine nicotine ordered    Scribed by Summer Egan, acting as a scribe for Raffaele Willams MD, 10/12/23 10:33 EDT.  Raffaele Willams MD's signature on the note affirms that the note adequately documents the care provided.        Scribed by Summer Egan, acting as a scribe for Raffaele Willams MD, 10/12/23 08:26 EDT.  Raffaele Willams MD's signature on the note affirms that the note adequately documents the care provided.        Follow Up     Return RTC 1 week after surgery.    Patient was given instructions and counseling regarding her condition. Please see specific information pulled into the AVS if appropriate.     Raffaele Willams MD  10/12/2023

## 2023-10-12 ENCOUNTER — OFFICE VISIT (OUTPATIENT)
Dept: PLASTIC SURGERY | Facility: CLINIC | Age: 43
End: 2023-10-12
Payer: OTHER GOVERNMENT

## 2023-10-12 ENCOUNTER — LAB (OUTPATIENT)
Dept: LAB | Facility: HOSPITAL | Age: 43
End: 2023-10-12
Payer: OTHER GOVERNMENT

## 2023-10-12 VITALS
SYSTOLIC BLOOD PRESSURE: 110 MMHG | WEIGHT: 141.8 LBS | HEIGHT: 60 IN | DIASTOLIC BLOOD PRESSURE: 73 MMHG | BODY MASS INDEX: 27.84 KG/M2 | OXYGEN SATURATION: 96 % | HEART RATE: 104 BPM | TEMPERATURE: 95.3 F

## 2023-10-12 DIAGNOSIS — M79.605 PAIN IN BOTH LOWER EXTREMITIES: ICD-10-CM

## 2023-10-12 DIAGNOSIS — R60.0 LOCALIZED EDEMA: ICD-10-CM

## 2023-10-12 DIAGNOSIS — Z01.818 PRE-OPERATIVE EXAMINATION: Primary | ICD-10-CM

## 2023-10-12 DIAGNOSIS — M79.604 PAIN IN BOTH LOWER EXTREMITIES: ICD-10-CM

## 2023-10-12 DIAGNOSIS — I89.0 LYMPH EDEMA: ICD-10-CM

## 2023-10-12 DIAGNOSIS — R60.9 LIPEDEMA: ICD-10-CM

## 2023-10-12 LAB — COTININE UR-MCNC: NEGATIVE NG/ML

## 2023-10-12 PROCEDURE — G0480 DRUG TEST DEF 1-7 CLASSES: HCPCS | Performed by: SURGERY

## 2023-10-13 RX ORDER — CEPHALEXIN 500 MG/1
500 CAPSULE ORAL 4 TIMES DAILY
Qty: 20 CAPSULE | Refills: 0 | Status: SHIPPED | OUTPATIENT
Start: 2023-10-13 | End: 2023-10-18

## 2023-10-13 RX ORDER — ONDANSETRON 4 MG/1
4 TABLET, FILM COATED ORAL EVERY 6 HOURS PRN
Qty: 20 TABLET | Refills: 0 | Status: SHIPPED | OUTPATIENT
Start: 2023-10-13 | End: 2024-10-12

## 2023-10-13 RX ORDER — ACETAMINOPHEN 500 MG
1000 TABLET ORAL EVERY 6 HOURS PRN
Qty: 18 TABLET | Refills: 0 | Status: SHIPPED | OUTPATIENT
Start: 2023-10-13

## 2023-10-13 RX ORDER — NAPROXEN 250 MG/1
250 TABLET ORAL 2 TIMES DAILY PRN
Qty: 12 TABLET | Refills: 0 | Status: SHIPPED | OUTPATIENT
Start: 2023-10-13

## 2023-10-13 RX ORDER — GABAPENTIN 300 MG/1
300 CAPSULE ORAL 3 TIMES DAILY
Qty: 18 CAPSULE | Refills: 0 | Status: SHIPPED | OUTPATIENT
Start: 2023-10-13 | End: 2023-10-19

## 2023-10-17 ENCOUNTER — TELEPHONE (OUTPATIENT)
Dept: PLASTIC SURGERY | Facility: CLINIC | Age: 43
End: 2023-10-17
Payer: OTHER GOVERNMENT

## 2023-10-17 ENCOUNTER — PRE-ADMISSION TESTING (OUTPATIENT)
Dept: PREADMISSION TESTING | Facility: HOSPITAL | Age: 43
End: 2023-10-17
Payer: OTHER GOVERNMENT

## 2023-10-17 ENCOUNTER — TELEPHONE (OUTPATIENT)
Dept: FAMILY MEDICINE CLINIC | Facility: CLINIC | Age: 43
End: 2023-10-17
Payer: OTHER GOVERNMENT

## 2023-10-17 VITALS
WEIGHT: 141.09 LBS | HEART RATE: 100 BPM | TEMPERATURE: 98 F | SYSTOLIC BLOOD PRESSURE: 108 MMHG | BODY MASS INDEX: 27.7 KG/M2 | OXYGEN SATURATION: 96 % | DIASTOLIC BLOOD PRESSURE: 78 MMHG | RESPIRATION RATE: 16 BRPM | HEIGHT: 60 IN

## 2023-10-17 RX ORDER — GABAPENTIN 300 MG/1
300 CAPSULE ORAL 3 TIMES DAILY
COMMUNITY

## 2023-10-17 NOTE — TELEPHONE ENCOUNTER
Left detailed message on VM. WE only do the  auth to the office for the initial consult. The Surgeons office is responsible for the surgery auth     Ok for HUB to Relay if pts calls back

## 2023-10-17 NOTE — TELEPHONE ENCOUNTER
Patient calls stating she has reached out to douglas about her surgery and is needing a new referral and more information regarding her surgery. She states we are suppose to be faxing more photos and medical necessity. I told patient the auth team is working on this and we would reach out to her once everything is settled. Patient is aware.

## 2023-10-17 NOTE — DISCHARGE INSTRUCTIONS
IMPORTANT INSTRUCTIONS - PRE-ADMISSION TESTING  DO NOT EAT/DRINK OR CHEW anything after midnight the night before your procedure.    Take the following medications the morning of your procedure with JUST A SIP OF WATER:  ______TRENTILLIX_________________________________________________________________________________________________________________________________________________________________________________    DO NOT BRING your medications to the hospital with you, UNLESS something has changed since your PRE-Admission Testing appointment.  Hold all vitamins, supplements, and NSAIDS (Non- steroidal anti-inflammatory meds) for one week prior to surgery (you MAY take Tylenol or Acetaminophen). IBUPROFEN 10/17/23  If you are diabetic, check your blood sugar the morning of your procedure. If it is less than 70 or if you are feeling symptomatic, call the following number for further instructions: [phone] WILL CALL ARRIVAL TIME 10/23 MONDAY BY 4 P.M.______.  Use your inhalers/nebulizers as usual, the morning of your procedure. BRING YOUR INHALERS with you. NA  Bring your CPAP or BIPAP to hospital, ONLY IF YOU WILL BE SPENDING THE NIGHT. NA  Make sure you have a ride home and have someone who will stay with you the day of your procedure after you go home.  If you have any questions, please call your Pre-Admission Testing Nurse, ______DESEAN__________ at [phone]_________.   Per anesthesia request, do not smoke for 24 hours before your procedure or as instructed by your surgeon. NA  SHOWER WITH SURGICAL SOAP AS DIRECTED.    PREOPERATIVE (BEFORE SURGERY)              BATHING INSTRUCTIONS  Instructions:    You will need to shower 1X utilizing the soap provided; at the times indicated   below:     - AM 10/24 OR  PM 10/23        Wash your hair and face with normal shampoo and soap, rinse it well before using the surgical soap.      In the shower, wet the skin completely with water from your neck to your feet.  Apply the cleanser to your   body ONLY FROM THE NECK TO YOUR FEET.     Do NOT USE THE CLEANSER ON YOUR FACE, HEAD, OR GENITAL (PRIVATE) AREAS.   Keep it out of your eyes, ears, and mouth because of the risk of injury to those areas.      Scrub with a clean washcloth for each bath utilizing the soap provided from the top of your body to the   bottom starting at the neck area.      Pay close attention to your armpits, groin area, and the site of surgery.      Wash your body gently for 5 minutes. Stand outside the stream or turn off the water while scrubbing your   body. Do NOT wash with your regular soap after the surgical cleanser is used.      RINSE THE CLEANSER OFF COMPLETELY with plenty of water. Rinse the area again thoroughly.      Dry off with a clean towel. The surgical soap can cause dryness; however do NOT APPLY LOTION,   CREAM, POWDER, and/or DEODORANT AFTER SHOWERING.     Be sure to where clean clothes after showering.      Ensure CLEAN BED LINENS AFTER FIRST wash with the surgical soap.      NO PETS ALLOWED IN THE BED with you after utilizing the surgical soap.

## 2023-10-17 NOTE — TELEPHONE ENCOUNTER
Caller: Angelique Cordon    Relationship to patient: Self    Patient is needing: INFORMED PATIENT OF MESSAGE, TRANSFERRED TO OFFICE FOR FURTHER QUESTIONS/ASSISTANCE.

## 2023-10-17 NOTE — TELEPHONE ENCOUNTER
Caller: Angelique Cordon    Relationship: Self    Best call back number: [phone]      What specialty or service is being requested: UPDATED REFERRAL TO PLASTIC SURGERY    What is the provider, practice or medical service name: DR. HUNTLEY    Any additional details: PATIENT IS SCHEDULED FOR SURGERY ON 10/24/23 AND IS NEEDING A NEW REFERRAL ASAP.

## 2023-10-18 NOTE — TELEPHONE ENCOUNTER
Pt spoke with referral Coordinator yesterday. Pt had run out of visits on  auth . New one was submitted.

## 2023-10-23 ENCOUNTER — PATIENT MESSAGE (OUTPATIENT)
Dept: FAMILY MEDICINE CLINIC | Facility: CLINIC | Age: 43
End: 2023-10-23
Payer: OTHER GOVERNMENT

## 2023-10-24 ENCOUNTER — ANESTHESIA (OUTPATIENT)
Dept: PERIOP | Facility: HOSPITAL | Age: 43
End: 2023-10-24
Payer: OTHER GOVERNMENT

## 2023-10-24 ENCOUNTER — HOSPITAL ENCOUNTER (OUTPATIENT)
Facility: HOSPITAL | Age: 43
Setting detail: HOSPITAL OUTPATIENT SURGERY
Discharge: HOME | End: 2023-10-24
Attending: SURGERY | Admitting: SURGERY
Payer: OTHER GOVERNMENT

## 2023-10-24 ENCOUNTER — ANESTHESIA EVENT (OUTPATIENT)
Dept: PERIOP | Facility: HOSPITAL | Age: 43
End: 2023-10-24
Payer: OTHER GOVERNMENT

## 2023-10-24 ENCOUNTER — TELEPHONE (OUTPATIENT)
Dept: FAMILY MEDICINE CLINIC | Facility: CLINIC | Age: 43
End: 2023-10-24
Payer: OTHER GOVERNMENT

## 2023-10-24 VITALS
RESPIRATION RATE: 16 BRPM | HEART RATE: 104 BPM | WEIGHT: 139.55 LBS | TEMPERATURE: 96.9 F | DIASTOLIC BLOOD PRESSURE: 63 MMHG | SYSTOLIC BLOOD PRESSURE: 114 MMHG | OXYGEN SATURATION: 97 % | BODY MASS INDEX: 27.4 KG/M2 | HEIGHT: 60 IN

## 2023-10-24 LAB
B-HCG UR QL: NEGATIVE
COTININE UR-MCNC: NEGATIVE NG/ML

## 2023-10-24 PROCEDURE — 25010000002 PROPOFOL 10 MG/ML EMULSION: Performed by: NURSE ANESTHETIST, CERTIFIED REGISTERED

## 2023-10-24 PROCEDURE — 25010000002 SUGAMMADEX 200 MG/2ML SOLUTION: Performed by: NURSE ANESTHETIST, CERTIFIED REGISTERED

## 2023-10-24 PROCEDURE — 0 HYDROMORPHONE 1 MG/ML SOLUTION: Performed by: NURSE ANESTHETIST, CERTIFIED REGISTERED

## 2023-10-24 PROCEDURE — 25010000002 EPINEPHRINE (ANAPHYLAXIS) 1 MG/ML SOLUTION: Performed by: SURGERY

## 2023-10-24 PROCEDURE — 25810000003 LACTATED RINGERS PER 1000 ML: Performed by: ANESTHESIOLOGY

## 2023-10-24 PROCEDURE — 25010000002 FENTANYL CITRATE (PF) 50 MCG/ML SOLUTION: Performed by: NURSE ANESTHETIST, CERTIFIED REGISTERED

## 2023-10-24 PROCEDURE — 25010000002 HYDROMORPHONE 1 MG/ML SOLUTION: Performed by: NURSE ANESTHETIST, CERTIFIED REGISTERED

## 2023-10-24 PROCEDURE — 25010000002 ONDANSETRON PER 1 MG: Performed by: NURSE ANESTHETIST, CERTIFIED REGISTERED

## 2023-10-24 PROCEDURE — 81025 URINE PREGNANCY TEST: CPT | Performed by: ANESTHESIOLOGY

## 2023-10-24 PROCEDURE — 25010000002 MIDAZOLAM PER 1MG: Performed by: ANESTHESIOLOGY

## 2023-10-24 PROCEDURE — G0480 DRUG TEST DEF 1-7 CLASSES: HCPCS | Performed by: SURGERY

## 2023-10-24 PROCEDURE — 25010000002 DEXAMETHASONE PER 1 MG: Performed by: NURSE ANESTHETIST, CERTIFIED REGISTERED

## 2023-10-24 PROCEDURE — 25010000002 KETOROLAC TROMETHAMINE PER 15 MG: Performed by: NURSE ANESTHETIST, CERTIFIED REGISTERED

## 2023-10-24 PROCEDURE — 25010000002 METOCLOPRAMIDE PER 10 MG: Performed by: ANESTHESIOLOGY

## 2023-10-24 RX ORDER — FAMOTIDINE 20 MG/1
20 TABLET, FILM COATED ORAL
Status: COMPLETED | OUTPATIENT
Start: 2023-10-24 | End: 2023-10-24

## 2023-10-24 RX ORDER — PHENYLEPHRINE HCL IN 0.9% NACL 1 MG/10 ML
SYRINGE (ML) INTRAVENOUS AS NEEDED
Status: DISCONTINUED | OUTPATIENT
Start: 2023-10-24 | End: 2023-10-24 | Stop reason: SURG

## 2023-10-24 RX ORDER — ONDANSETRON 2 MG/ML
4 INJECTION INTRAMUSCULAR; INTRAVENOUS ONCE AS NEEDED
Status: DISCONTINUED | OUTPATIENT
Start: 2023-10-24 | End: 2023-10-24 | Stop reason: HOSPADM

## 2023-10-24 RX ORDER — TRANEXAMIC ACID 10 MG/ML
1000 INJECTION, SOLUTION INTRAVENOUS ONCE
Status: DISCONTINUED | OUTPATIENT
Start: 2023-10-24 | End: 2023-10-24 | Stop reason: HOSPADM

## 2023-10-24 RX ORDER — FAMOTIDINE 10 MG/ML
20 INJECTION, SOLUTION INTRAVENOUS
Status: COMPLETED | OUTPATIENT
Start: 2023-10-24 | End: 2023-10-24

## 2023-10-24 RX ORDER — FAMOTIDINE 20 MG/1
20 TABLET, FILM COATED ORAL
Status: DISCONTINUED | OUTPATIENT
Start: 2023-10-24 | End: 2023-10-24 | Stop reason: SDUPTHER

## 2023-10-24 RX ORDER — MEPERIDINE HYDROCHLORIDE 25 MG/ML
12.5 INJECTION INTRAMUSCULAR; INTRAVENOUS; SUBCUTANEOUS
Status: DISCONTINUED | OUTPATIENT
Start: 2023-10-24 | End: 2023-10-24 | Stop reason: HOSPADM

## 2023-10-24 RX ORDER — KETOROLAC TROMETHAMINE 30 MG/ML
INJECTION, SOLUTION INTRAMUSCULAR; INTRAVENOUS AS NEEDED
Status: DISCONTINUED | OUTPATIENT
Start: 2023-10-24 | End: 2023-10-24 | Stop reason: SURG

## 2023-10-24 RX ORDER — PROMETHAZINE HYDROCHLORIDE 12.5 MG/1
25 TABLET ORAL ONCE AS NEEDED
Status: DISCONTINUED | OUTPATIENT
Start: 2023-10-24 | End: 2023-10-24 | Stop reason: HOSPADM

## 2023-10-24 RX ORDER — SODIUM CHLORIDE, SODIUM LACTATE, POTASSIUM CHLORIDE, AND CALCIUM CHLORIDE .6; .31; .03; .02 G/100ML; G/100ML; G/100ML; G/100ML
INJECTION, SOLUTION INTRAVENOUS AS NEEDED
Status: DISCONTINUED | OUTPATIENT
Start: 2023-10-24 | End: 2023-10-24 | Stop reason: HOSPADM

## 2023-10-24 RX ORDER — SODIUM CHLORIDE, SODIUM LACTATE, POTASSIUM CHLORIDE, CALCIUM CHLORIDE 600; 310; 30; 20 MG/100ML; MG/100ML; MG/100ML; MG/100ML
9 INJECTION, SOLUTION INTRAVENOUS CONTINUOUS PRN
Status: DISCONTINUED | OUTPATIENT
Start: 2023-10-24 | End: 2023-10-24 | Stop reason: HOSPADM

## 2023-10-24 RX ORDER — EPINEPHRINE 1 MG/ML
INJECTION, SOLUTION INTRAMUSCULAR; SUBCUTANEOUS AS NEEDED
Status: DISCONTINUED | OUTPATIENT
Start: 2023-10-24 | End: 2023-10-24 | Stop reason: HOSPADM

## 2023-10-24 RX ORDER — LIDOCAINE HYDROCHLORIDE 20 MG/ML
INJECTION, SOLUTION EPIDURAL; INFILTRATION; INTRACAUDAL; PERINEURAL AS NEEDED
Status: DISCONTINUED | OUTPATIENT
Start: 2023-10-24 | End: 2023-10-24 | Stop reason: SURG

## 2023-10-24 RX ORDER — PROPOFOL 10 MG/ML
VIAL (ML) INTRAVENOUS AS NEEDED
Status: DISCONTINUED | OUTPATIENT
Start: 2023-10-24 | End: 2023-10-24 | Stop reason: SURG

## 2023-10-24 RX ORDER — METOCLOPRAMIDE HYDROCHLORIDE 5 MG/ML
10 INJECTION INTRAMUSCULAR; INTRAVENOUS ONCE AS NEEDED
Status: COMPLETED | OUTPATIENT
Start: 2023-10-24 | End: 2023-10-24

## 2023-10-24 RX ORDER — MIDAZOLAM HYDROCHLORIDE 2 MG/2ML
2 INJECTION, SOLUTION INTRAMUSCULAR; INTRAVENOUS ONCE
Status: COMPLETED | OUTPATIENT
Start: 2023-10-24 | End: 2023-10-24

## 2023-10-24 RX ORDER — SEMAGLUTIDE 0.25 MG/.5ML
0.25 INJECTION, SOLUTION SUBCUTANEOUS WEEKLY
Qty: 2 ML | Refills: 1 | Status: SHIPPED | OUTPATIENT
Start: 2023-10-24

## 2023-10-24 RX ORDER — OXYCODONE HYDROCHLORIDE 5 MG/1
5 TABLET ORAL
Status: COMPLETED | OUTPATIENT
Start: 2023-10-24 | End: 2023-10-24

## 2023-10-24 RX ORDER — FENTANYL CITRATE 50 UG/ML
INJECTION, SOLUTION INTRAMUSCULAR; INTRAVENOUS AS NEEDED
Status: DISCONTINUED | OUTPATIENT
Start: 2023-10-24 | End: 2023-10-24 | Stop reason: SURG

## 2023-10-24 RX ORDER — DEXMEDETOMIDINE HYDROCHLORIDE 100 UG/ML
INJECTION, SOLUTION INTRAVENOUS AS NEEDED
Status: DISCONTINUED | OUTPATIENT
Start: 2023-10-24 | End: 2023-10-24 | Stop reason: SURG

## 2023-10-24 RX ORDER — ONDANSETRON 2 MG/ML
INJECTION INTRAMUSCULAR; INTRAVENOUS AS NEEDED
Status: DISCONTINUED | OUTPATIENT
Start: 2023-10-24 | End: 2023-10-24 | Stop reason: SURG

## 2023-10-24 RX ORDER — PROMETHAZINE HYDROCHLORIDE 25 MG/1
25 SUPPOSITORY RECTAL ONCE AS NEEDED
Status: DISCONTINUED | OUTPATIENT
Start: 2023-10-24 | End: 2023-10-24 | Stop reason: HOSPADM

## 2023-10-24 RX ORDER — ROCURONIUM BROMIDE 10 MG/ML
INJECTION, SOLUTION INTRAVENOUS AS NEEDED
Status: DISCONTINUED | OUTPATIENT
Start: 2023-10-24 | End: 2023-10-24 | Stop reason: SURG

## 2023-10-24 RX ORDER — DEXAMETHASONE SODIUM PHOSPHATE 4 MG/ML
INJECTION, SOLUTION INTRA-ARTICULAR; INTRALESIONAL; INTRAMUSCULAR; INTRAVENOUS; SOFT TISSUE AS NEEDED
Status: DISCONTINUED | OUTPATIENT
Start: 2023-10-24 | End: 2023-10-24 | Stop reason: SURG

## 2023-10-24 RX ORDER — ACETAMINOPHEN 500 MG
1000 TABLET ORAL ONCE
Status: COMPLETED | OUTPATIENT
Start: 2023-10-24 | End: 2023-10-24

## 2023-10-24 RX ADMIN — FENTANYL CITRATE 25 MCG: 50 INJECTION, SOLUTION INTRAMUSCULAR; INTRAVENOUS at 11:56

## 2023-10-24 RX ADMIN — ROCURONIUM BROMIDE 10 MG: 50 INJECTION INTRAVENOUS at 08:45

## 2023-10-24 RX ADMIN — MIDAZOLAM HYDROCHLORIDE 2 MG: 1 INJECTION, SOLUTION INTRAMUSCULAR; INTRAVENOUS at 07:11

## 2023-10-24 RX ADMIN — DEXAMETHASONE SODIUM PHOSPHATE 4 MG: 4 INJECTION, SOLUTION INTRAMUSCULAR; INTRAVENOUS at 07:34

## 2023-10-24 RX ADMIN — PROPOFOL 120 MG: 10 INJECTION, EMULSION INTRAVENOUS at 07:26

## 2023-10-24 RX ADMIN — Medication 50 MCG: at 09:06

## 2023-10-24 RX ADMIN — DEXMEDETOMIDINE 12 MCG: 100 INJECTION, SOLUTION INTRAVENOUS at 08:05

## 2023-10-24 RX ADMIN — FENTANYL CITRATE 25 MCG: 50 INJECTION, SOLUTION INTRAMUSCULAR; INTRAVENOUS at 10:01

## 2023-10-24 RX ADMIN — SUGAMMADEX 150 MG: 100 INJECTION, SOLUTION INTRAVENOUS at 11:56

## 2023-10-24 RX ADMIN — OXYCODONE HYDROCHLORIDE 5 MG: 5 TABLET ORAL at 12:20

## 2023-10-24 RX ADMIN — SODIUM CHLORIDE, POTASSIUM CHLORIDE, SODIUM LACTATE AND CALCIUM CHLORIDE 9 ML/HR: 600; 310; 30; 20 INJECTION, SOLUTION INTRAVENOUS at 07:11

## 2023-10-24 RX ADMIN — ONDANSETRON 4 MG: 2 INJECTION INTRAMUSCULAR; INTRAVENOUS at 13:22

## 2023-10-24 RX ADMIN — ACETAMINOPHEN 1000 MG: 500 TABLET ORAL at 07:11

## 2023-10-24 RX ADMIN — FAMOTIDINE 20 MG: 10 INJECTION INTRAVENOUS at 07:11

## 2023-10-24 RX ADMIN — OXYCODONE HYDROCHLORIDE 5 MG: 5 TABLET ORAL at 12:35

## 2023-10-24 RX ADMIN — ROCURONIUM BROMIDE 30 MG: 50 INJECTION INTRAVENOUS at 07:26

## 2023-10-24 RX ADMIN — ONDANSETRON 4 MG: 2 INJECTION INTRAMUSCULAR; INTRAVENOUS at 07:48

## 2023-10-24 RX ADMIN — LIDOCAINE HYDROCHLORIDE 60 MG: 20 INJECTION, SOLUTION EPIDURAL; INFILTRATION; INTRACAUDAL; PERINEURAL at 07:24

## 2023-10-24 RX ADMIN — HYDROMORPHONE HYDROCHLORIDE 0.5 MG: 1 INJECTION, SOLUTION INTRAMUSCULAR; INTRAVENOUS; SUBCUTANEOUS at 08:47

## 2023-10-24 RX ADMIN — DEXMEDETOMIDINE 8 MCG: 100 INJECTION, SOLUTION INTRAVENOUS at 08:12

## 2023-10-24 RX ADMIN — ROCURONIUM BROMIDE 10 MG: 50 INJECTION INTRAVENOUS at 08:12

## 2023-10-24 RX ADMIN — Medication 100 MCG: at 08:43

## 2023-10-24 RX ADMIN — DEXMEDETOMIDINE 4 MCG: 100 INJECTION, SOLUTION INTRAVENOUS at 09:10

## 2023-10-24 RX ADMIN — HYDROMORPHONE HYDROCHLORIDE 0.5 MG: 1 INJECTION, SOLUTION INTRAMUSCULAR; INTRAVENOUS; SUBCUTANEOUS at 12:18

## 2023-10-24 RX ADMIN — HYDROMORPHONE HYDROCHLORIDE 0.5 MG: 1 INJECTION, SOLUTION INTRAMUSCULAR; INTRAVENOUS; SUBCUTANEOUS at 12:25

## 2023-10-24 RX ADMIN — DEXMEDETOMIDINE 6 MCG: 100 INJECTION, SOLUTION INTRAVENOUS at 10:11

## 2023-10-24 RX ADMIN — FENTANYL CITRATE 50 MCG: 50 INJECTION, SOLUTION INTRAMUSCULAR; INTRAVENOUS at 07:56

## 2023-10-24 RX ADMIN — METOCLOPRAMIDE HYDROCHLORIDE 10 MG: 5 INJECTION INTRAMUSCULAR; INTRAVENOUS at 07:11

## 2023-10-24 RX ADMIN — FENTANYL CITRATE 50 MCG: 50 INJECTION, SOLUTION INTRAMUSCULAR; INTRAVENOUS at 10:10

## 2023-10-24 RX ADMIN — TRANEXAMIC ACID 1000 MG: 100 INJECTION, SOLUTION INTRAVENOUS at 07:58

## 2023-10-24 RX ADMIN — KETOROLAC TROMETHAMINE 15 MG: 30 INJECTION, SOLUTION INTRAMUSCULAR; INTRAVENOUS at 08:10

## 2023-10-24 RX ADMIN — FENTANYL CITRATE 50 MCG: 50 INJECTION, SOLUTION INTRAMUSCULAR; INTRAVENOUS at 07:24

## 2023-10-24 NOTE — PROGRESS NOTES
Chief Complaint  No chief complaint on file.  Mode of Visit: Audio  Location of patient: home  Patient consents to use an audio connection for medical care today.  The visit included audio interaction. No technical issues occurred during this visit.  Length of visit: 5 minutes     Subjective          History of Present Illness  Angelique Cordon is a 43 y.o. female who presents to [facility] for Postoperative Follow-Up of Liposuction, right lower extremity circumferential, Liposuction with skin resection and negative pressure dressing 10/24/23.    Pt presents today for 1 week post op.    Allergies: Patient has no known allergies.  Allergies Reconciled.    Review of Systems   All review of system has been reviewed and it  is negative except the ones note above.     Objective     There were no vitals taken for this visit.    There is no height or weight on file to calculate BMI.      We were planning to do a video call, we ended doing a phone call. Mrs Cordon told me she was feeling much better today. She was eating and resting at her bed.     Result Review :                Assessment and Plan      Diagnoses and all orders for this visit:    1. Dehiscence of operative wound, initial encounter (Primary)  -     OT Consult: Eval & Treat; Future        Plan:  She will follow up next week.         Follow Up     No follow-ups on file.    Patient was given instructions and counseling regarding her condition. Please see specific information pulled into the AVS if appropriate.     Summer Egan  11/01/2023   Answers submitted by the patient for this visit:  Other (Submitted on 10/25/2023)

## 2023-10-24 NOTE — DISCHARGE INSTRUCTIONS
DISCHARGE INSTRUCTIONS  SURGICAL / AMBULATORY  PROCEDURES      For your surgery you had:  General anesthesia (you may have a sore throat for the first 24 hours)  IV sedation.  Local anesthesia  Monitored anesthesia Care    You have received an anesthesia medication today that can cause hormonal forms of birth control to be ineffective. You should use a different form of birth control (to prevent pregnancy) for 7 days.   You may experience dizziness, drowsiness, or light-headedness for several hours following surgery/procedure.  Do not stay alone today or tonight.  Limit your activity for 24 hours.  Resume your diet slowly.  Follow whatever special dietary instructions you may have been given by your doctor.  You should not drive or operate machinery, drink alcohol, or sign legally binding documents for 24 hours or while you are taking pain medication.  Last dose of pain medication was given at:  Tylenol at 0700am   Toradol 0810am  .  NOTIFY YOUR DOCTOR IF YOU EXPERIENCE ANY OF THE FOLLOWING:  Temperature greater than 101 degrees Fahrenheit  Shaking Chills  Redness or excessive drainage from incision  Nausea, vomiting and/or pain that is not controlled by prescribed medications  Increase in bleeding or bleeding that is excessive  Unable to urinate in 6 hours after surgery  If unable to reach your doctor, please go to the closest Emergency Room  You may begin dressing changes:     [x] in 24 hours   [] in 48 hours   [x]Other: take dressings off tomorrow . Shower, rewrap daily expect A LOT OF DRAINAGE      You may shower TOMORROW   .    Medications per physician instructions as indicated on Discharge Medication Information Sheet.  You should see Dr. HUNTLEY  for follow-up care   on AS SCHEDULED/ AS NEEDED   .  Phone number: [phone]       SPECIAL INSTRUCTIONS:

## 2023-10-24 NOTE — TELEPHONE ENCOUNTER
----- Message from Jenelle Browning MA sent at 10/23/2023  4:49 PM EDT -----  Regarding: ADAM: Wegovy   Contact: [phone]    ----- Message -----  From: Angelique Cordon  Sent: 10/23/2023   3:11 PM EDT  To: [facility] [employer] Clinical Pool  Subject: Wegovy                                           Good Afternoon,     I am going to have to see if you can put in the .25 I am going to be having surgery tomorrow for one of my legs, and I have start back over for this medication as per the  I will have been off for over 2 week. I will be off for at least 4 between going off before surgery and then when I think my body will be ready for a restart.     Thank you  Angelique

## 2023-10-24 NOTE — ANESTHESIA POSTPROCEDURE EVALUATION
Patient: Angelique Cordon    Procedure Summary       Date: 10/24/23 Room / Location: [facility] OR [address] [place] [address] [place] [facility]    Anesthesia Start: 0717 Anesthesia Stop: 1209    Procedure: LIPOSUCTION, Right Lower Extremity Circumferential Liposuction with Skin Resection and negative pressure dressing (Right) Diagnosis:       Localized edema      Lipedema      Difficulty walking      Pain in both lower extremities      (Localized edema [R60.0])      (Lipedema [R60.9])      (Difficulty walking [R26.2])      (Pain in both lower extremities [M79.604, M79.605])    Surgeons: Raffaele Willams MD Provider: Sander Lucero MD    Anesthesia Type: general ASA Status: 2            Anesthesia Type: general    Vitals  Vitals Value Taken Time   /69 10/24/23 1237   Temp 36.2 °C (97.1 °F) 10/24/23 1206   Pulse 106 10/24/23 1241   Resp 9 10/24/23 1230   SpO2 95 % 10/24/23 1241           Post Anesthesia Care and Evaluation    Patient location during evaluation: bedside  Patient participation: complete - patient participated  Level of consciousness: awake  Pain score: 2  Pain management: adequate    Airway patency: patent  PONV Status: none  Cardiovascular status: acceptable and stable  Respiratory status: acceptable  Hydration status: acceptable    Comments: An Anesthesiologist personally participated in the most demanding procedures (including induction and emergence if applicable) in the anesthesia plan, monitored the course of anesthesia administration at frequent intervals and remained physically present and available for immediate diagnosis and treatment of emergencies.

## 2023-10-24 NOTE — ANESTHESIA PREPROCEDURE EVALUATION
Anesthesia Evaluation     Patient summary reviewed and Nursing notes reviewed   no history of anesthetic complications:   NPO Solid Status: > 8 hours  NPO Liquid Status: > 2 hours           Airway   Mallampati: II  TM distance: >3 FB  Neck ROM: full  No difficulty expected  Dental      Pulmonary - negative pulmonary ROS and normal exam    breath sounds clear to auscultation  Cardiovascular - negative cardio ROS and normal exam  Exercise tolerance: good (4-7 METS)    Rhythm: regular  Rate: normal        Neuro/Psych  (+) psychiatric history  GI/Hepatic/Renal/Endo - negative ROS     Musculoskeletal (-) negative ROS    Abdominal    Substance History - negative use     OB/GYN negative ob/gyn ROS         Other - negative ROS       ROS/Med Hx Other: PAT Nursing Notes unavailable.                 Anesthesia Plan    ASA 2     general     (Patient understands anesthesia not responsible for dental damage.    Pt was on wegovy, held prior to procedure, will give pepcid/reglan)  intravenous induction     Anesthetic plan, risks, benefits, and alternatives have been provided, discussed and informed consent has been obtained with: patient.    Use of blood products discussed with patient .    Plan discussed with CRNA.      CODE STATUS:

## 2023-10-24 NOTE — INTERVAL H&P NOTE
H&P reviewed. The patient was examined and there are no changes to the H&P.    Patient is not tachycardic  Respirations are non elaborated  Vitals:    10/24/23 0614   BP: 109/75   Pulse: 86   Resp: 20   Temp: 98.1 °F (36.7 °C)   SpO2: 99%     Risks and benefits reminded to patient who agrees to proceed

## 2023-10-24 NOTE — OP NOTE
Plastic Surgery Op Note    RIGHT LOWER EXTREMITY LYMPHEDEMA DEBULKING WITH LIPOSUCTION AND SKIN EXCISION WITH COMPLEX CLOSURE     Angelique Cordon  10/24/2023    Pre-op Diagnosis:   Localized edema [R60.0]  Lipedema [R60.9]  Difficulty walking [R26.2]  Pain in both lower extremities [M79.604, M79.605]    Post-Op Diagnosis Codes:     * Localized edema [R60.0]     * Lipedema [R60.9]     * Difficulty walking [R26.2]     * Pain in both lower extremities [M79.604, M79.605]    Anesthesia: General    Staff:   Circulator: Saida Briceño RN  Scrub Person: Antonette Orr  Assistant: Patricia Ventura RN CSA  Other: Sharee Meza RN    Surgeon: Dr Willams  First Assistant: ADAN Meneses who was instrumental in helping with hemostasis, visualization and retraction structures as well as surgical closure.  Her skilled assistance was necessary for the success of this case.      Estimated Blood Loss: 200ml    Specimens:   Order Name Source Comment Collection Info Order Time   PREGNANCY, URINE Urine, Clean Catch  Collected By: Katya Joshi 10/24/2023  6:10 AM     Release to patient   Routine Release        NICOTINE SCREEN, URINE Urine, Clean Catch  Collected By: Katya Joshi 10/24/2023  6:13 AM     Release to patient   Routine Release              Drains:   Closed/Suction Drain 1 Right Thigh Bulb 15 Fr. (Active)   Dressing Status Clean;Dry;Intact 10/24/23 1206   Drainage Appearance Bloody 10/24/23 1206   Status To bulb suction 10/24/23 1206       Urethral Catheter (Active)   Daily Indications Selected surgeries ( tract, abdomen) 10/24/23 1206   Site Assessment Clean;Skin intact 10/24/23 1206   Collection Container Standard drainage bag 10/24/23 1206   Securement Method Tape 10/24/23 1206       Findings:     Tumescent: 3.4L  Lipoaspirate: 6L    Skin excision: 781g  Skin excision size: 70 x 10 cm                                  50x 7 cm           Complications: none     After risks and benefits were discussed with  patient and informed consent was signed, patient was taken to the OR and positioned supine.  She was anesthetized and placed prone. The surgical site was then prepped in a sterile fashion way and a time out was performed confirming patient's name and procedure to be performed. All surgical team was introduced by name.   I then injected tumescent and with a combination of cannulas #5,4 and 3, lipoaspirate was obtained. Then, the excess of skin was excised and the skin closed in 3 layers, patient was flipped in the room and the leg re prepped. I then repeated the same steps. A sterile dressing was applied.    Patient tolerated procedure well, there were no complications, all instruments were checked and patient was awakened and taken to PACU in stable condition.  I was present for the entire procedure.     Date: 10/24/2023  Time: 12:21 EDT           Raffaele Willams MD  10/24/23  12:21 EDT

## 2023-10-26 ENCOUNTER — DOCUMENTATION (OUTPATIENT)
Dept: CASE MANAGEMENT | Facility: OTHER | Age: 43
End: 2023-10-26

## 2023-10-26 ENCOUNTER — OFFICE VISIT (OUTPATIENT)
Dept: PLASTIC SURGERY | Facility: CLINIC | Age: 43
End: 2023-10-26
Payer: OTHER GOVERNMENT

## 2023-10-26 ENCOUNTER — DOCUMENTATION (OUTPATIENT)
Dept: CASE MANAGEMENT | Facility: OTHER | Age: 43
End: 2023-10-26
Payer: OTHER GOVERNMENT

## 2023-10-26 VITALS — BODY MASS INDEX: 27.29 KG/M2 | WEIGHT: 139 LBS | HEIGHT: 60 IN

## 2023-10-26 DIAGNOSIS — I89.0 LYMPHEDEMA: Primary | ICD-10-CM

## 2023-10-26 DIAGNOSIS — T81.31XA DEHISCENCE OF OPERATIVE WOUND, INITIAL ENCOUNTER: Primary | ICD-10-CM

## 2023-10-26 PROCEDURE — 99024 POSTOP FOLLOW-UP VISIT: CPT | Performed by: SURGERY

## 2023-10-26 NOTE — THERAPY DISCHARGE NOTE
History and Physical 23        NAME: Tio Garcia  :   MRN: [id number]      Assessment/Plan:  Tio Garcia is a 68 y.o. female with a history of CAD, Afib,  who presented to [address] [place] [address] 1/10/2023 with Abdominal pain with hematemesis admitted to the ICU for hypoxia and slight hypotension requiring vasoactive agents. Problem list  Hypoxic respiratory distress  Bilateral pleural effusion  Procedure induced hypotension  Pneumothorax  Upper GIB  Thoracic/GE Junction Tumor with necrotic bowel   Hx of Kellie en Y bypas surgery  Chronic Afib with acute RVR  Sepsis   Severe protein malnutrition. Chronic pain    Neuro: Alert, oriented, following commands. Pain controlled on current regimen. Adjust as needed. Continue to monitor  Cardio:  Hypotensive etiology likely from procedure induced hypotension with versed requiring levophed. Titrate off as tolerated for MAP > 65 mmHg. Echo pending. Known hx of CAD s/p CABG with chronic systlic and diastolic heart failure. Troponin elevated likely Type 2 MI in setting of sepsis? Noted Afib RVR: Known to have had multiple cardioversions, cryoablation and not on AC due to upper GIB. Etiology most likely related to sepsis? Thought not primary cause, started on negative chronotropic agents and anticoagulation by cardiology. Per LENIENT Trial, will avoid tight control and allow target rate to be in 100-120 BPM pending resolution of underlying cause. Given her Upper GIB and large tumor will hold any anticoagulation for now. Cardiology following. Resp: Hypoxic, etiology likely from bilateral neoplastic pleural effusion. S/p B/L thoracentesis with removal of about 650 cc of fluid. Cell count show neutrophils with macrophage. Continue aggressive pulm toilet  GI: NPO for now in setting of GIB. concern for Upper GIB with coffee ground emesis. Patient with NGT to LIS wit noted coffee ground output.  Large 10x6x9 cm intrathoracic tumor at the level of the GE junction Outpatient Occupational Therapy Lymphedema Initial Evaluation/Discharge       Patient Name: Angelique Cordon  : 1980  MRN: [id number]  Today's Date: 10/26/2023        Visit Date: 10/26/2023    Patient Active Problem List   Diagnosis    Annual physical exam    Depression    Sebaceous cyst    Varicose veins of both lower extremities with pain    Lipedema    Localized edema    Pain in both lower extremities    Difficulty walking    Anxiety    Overweight (BMI 25.0-29.9)        Past Medical History:   Diagnosis Date    Abnormal bone density screening     Anxiety     Lipedema of lower extremity     LEGS AND ARMS    Pap smear for cervical cancer screening 2018    Varicose vein of leg     Visit for screening mammogram         Past Surgical History:   Procedure Laterality Date    EXCISION LESION Left 2022    Procedure: EXCISION CYST LEFT AXILLARY;  Surgeon: Rosalie Talley MD;  [place] [facility] [place] [facility]  Service: General;  Laterality: Left;    FOOT SURGERY Left  HAD 3 DIFFERENT SURGERIES    L FOOT/TENDON RELEASE PLANTAR FASIA    LIPOSUCTION Right 10/24/2023    Procedure: LIPOSUCTION, Right Lower Extremity Circumferential Liposuction with Skin Resection and negative pressure dressing;  Surgeon: Raffaele Willams MD;  [place] [facility] [place] [facility]  Service: Plastics;  Laterality: Right;         Visit Dx:    ICD-10-CM ICD-9-CM   1. Lymphedema  I89.0 457.1           Goals:   1. Uncontrolled lymphedema of Bilateral  lower extremity/lower quadrant.  LTG1:  90 days:  Volumetric measurements of lower extremity/lower quadrant will be decreased by 10% or until plateau in reduction is achieved to improve functional mobility.               STATUS:   Not met              STG1a:  30 days:  Volumetric measurements of bilateral lower extremity/lower quadrant will be decreased by 5% bilaterally to improve functional mobility.    STATUS: Not met  TREATMENT:  Manual Therapy, Therapeutic exercise, ADL/self-care therapy,  Bioimpedence Fluid Analysis, Orthotic management and training, Therapeutic Activity, wound dressing as needed, Modalities: TENS, NMES, Ultrasound, Fluidotherapy, , and Patient and family/caregiver education.     2. Patient with decreased ADL/Self-Care routine for lymphedema management.              LTG 2:  90 days:  Patient/caregiver will independently verbalize/demonstrate ADL/Self-Care routine for lymphedema management.                      STATUS:   Partially met              STG 2a:  30 days:  Patient/caregiver will independently perform or verbally dictate compression wrapping technique of the lower extremity/lower quadrant.                      STATUS:   Met              STG2b:  30 days:  Patient will independently verbalize signs and symptoms of infection.                                                    STATUS:   Met              STG2c:  30 days:  Patient will independently demonstrate/verbalize proper skin care routine to prevent infection and or wound development.                       STATUS:   Met  STG2d:  30 days:  Patient will independently perform teach back of HEP routine.                      STATUS: Met  STG2e:  30 days:  Patient/caregiver will obtain properly fitting compression orthosis, will demonstrate don/doff skill of compression orthosis with modified independence, and independently verbalize wear schedule.          STATUS: Not met      STG2f: 30 days: Patient/caregiver will independently perform self-manual lymphatic drainage of the lower extremity/lower quadrant.          STATUS: Not met  TREATMENT:  Therapeutic Activity, Patient/Caregiver Education, ADL retraining, Manual Therapy, Orthotic Management and training, Modalities: TENS, NMES, Ultrasound, Fluidotherapy Wound dressing if necessary,  Therapeutic Exercise, Modalities                 3. Self-Care Limitations                                    LTG 3: 90 days:  The patient will demonstrate improved quality of life by achieving a  concern for leiomyosarcoma or GIST tumor. Recent EGD on 1/6/2022 showing no stricture, no scaring, normal RNY gastric pouch without any evidence of esophagitis. Previous EGD from 11/29 did show evidence of distal esophageal ischemic necrosis. Plan to to perform upper GI series with small bowel follow though for evaluation of anatomy and function. GI following further recommendations appreciated  : Cr stable. AGAP acidosis, etiology unclear. Start bicitra. Monitor electrolytes and replenish as needed  Heme:  Known tumor with tumor with recent EGD showing distal esophageal ischemic necrosis with evidence of necrotic bowel. ID: Sepsis etiology unclear, possible PNA on intravenous broad spectrum antibiotics. Follow up cultures, blood, urine, sputum and Pleural fluid. F/U Atypical work up, procal and MRSA nares. Narrow abx regimen when able. Endo: Known hypothyroidism on levothyroxine. POC BG PRN -180 mg/dL. Adjust as needed  MSK: Cachectic, poor nutrition, poor musle    Tubes/Lines/Drains:  PIV, [place], Chest tube, fabian,    Code Status: full          Chief Complaint:    Shortness of breath    History of Present Illness:    73yoF with multiple co morbidities who presented to the ED from [facility] for concern of melanotic coffee- ground emesis, abdominal and chest pain. She endorsed abdominal pain, loose dark stool and coffee ground emesis with noted black crust around her lips. She has a hx of Afib controlled with amiodarone, but not on [facility] for concern of Upper GIB. She had a EGD done on 1/6/2023 with no esophageal strictures     Called overnight to patient hypoxic with increasing oxygen requirement. Bilateral Thoracentesis performed at bedside with cumulative output of about 600 cc of what appeared to be Straw colored fluid on the Left and 200 cc of blood tinged fluid on the right.  Throughout the procedure patient was noted to be hypotensive requiring support with some david synephrine and transitioned to levophed. She was transferred to ICU for hypoxia on NR @ 15L. and transient hypotension. Patient was seen and evaluated at bedside in mild respiratory distress, complaining of shortness of breath. ROS:  Review of Systems   Constitutional:  Negative for chills and fever. HENT:  Negative for congestion. Respiratory:  Positive for shortness of breath. Negative for cough. Cardiovascular:  Positive for chest pain and palpitations. Negative for leg swelling. Gastrointestinal:  Positive for abdominal pain, nausea and vomiting. Hematemesis   Genitourinary: Catalan cath in place. Musculoskeletal:  Positive for gait problem. Nonambulatory, chronic leg pain   Skin: Negative. Neurological:  Negative for dizziness and weakness. Psychiatric/Behavioral: Negative. Past Medical, Surgical, Social, Family History:   Past Medical History:   Diagnosis Date    CAD (coronary artery disease)     s/p cabg    Cataract     Dysphagia     Epiglottiditis     GERD (gastroesophageal reflux disease)     GERD (gastroesophageal reflux disease)     Hematemesis     History of GI bleed     HLD (hyperlipidemia)     Iron deficiency     Mitral regurgitation     torn Chordae    Non-rheumatic mitral valve disease     OA (osteoarthritis)     PAF (paroxysmal atrial fibrillation) (HCC)     Thyroid disease     VRE (vancomycin resistant enterococcus) culture positive 11/29/2022     Past Surgical History:   Procedure Laterality Date    APPENDECTOMY      BREAST BIOPSY Left 04/04/2017    CABG WITH AORTIC VALVE REPAIR      mitral valve repair.  [place] to LAD    CARDIAC CATHETERIZATION      CARDIAC VALVE REPLACEMENT  2012    CATARACT REMOVAL Right 08/15/2018    CHOLECYSTECTOMY      COLONOSCOPY  2013    CORONARY ARTERY BYPASS GRAFT  06/06/2012    x1    ESOPHAGOGASTRODUODENOSCOPY  01/06/2023    ESOPHAGOGASTRODUODENOSCOPY  07/12/2022    GASTRIC BYPASS SURGERY      20 years ago    HYSTERECTOMY (CERVIX STATUS UNKNOWN) score of 20 or less on the Lymphedema Life Impact Scale.                      STATUS:  Partially Met              STG 3a: 30 days:  The patient will demonstrate improved quality of life by achieving a score of of 24 or less on the Lymphedema Life Impact Scale.                      STATUS:  Partially Met  TREATMENT:  Manual therapy, therapeutic exercise, therapeutic activity, ADL retraining, Patient/caregiver education, Modalities: TENS, NMES, Ultrasound, Fluidotherapy Orthotic Management and Training, Wound dressing as needed.                                           Time Calculation:                   OP OT Discharge Summary  Reason for Discharge: other (comment) (Pt is to have surgery and will change providers.  Pt will return to the clinic after surgery)  Outcomes Achieved: Patient able to partially acheive established goals  Discharge Destination: Other (comment)  Discharge Instructions: Pt is to have surgery and will change providers. Pt will return to the clinic after surgery      Kathia Obando OT  10/26/2023   JOINT REPLACEMENT Bilateral     TKR    KNEE ARTHROSCOPY Right 2014    MITRAL VALVE REPAIR      TONSILLECTOMY      TUBAL LIGATION      UPPER GASTROINTESTINAL ENDOSCOPY       Social History     Socioeconomic History    Marital status:      Spouse name: Not on file    Number of children: Not on file    Years of education: Not on file    Highest education level: Not on file   Occupational History    Not on file   Tobacco Use    Smoking status: Unknown    Smokeless tobacco: Not on file   Substance and Sexual Activity    Alcohol use: Not on file    Drug use: Not on file    Sexual activity: Not on file   Other Topics Concern    Not on file   Social History Narrative    Not on file     Social Determinants of Health     Financial Resource Strain: Not on file   Food Insecurity: Not on file   Transportation Needs: Not on file   Physical Activity: Not on file   Stress: Not on file   Social Connections: Not on file   Intimate Partner Violence: Not on file   Housing Stability: Not on file     Family History   Family history unknown: Yes       Home Medications:  Prior to Admission medications    Medication Sig Start Date End Date Taking?  Authorizing Provider   cyanocobalamin 1000 MCG/ML injection Inject 1,000 mcg into the muscle every 30 days Receives on the 5 th of each month   Yes Historical Provider, MD   famotidine (PEPCID) 20 MG tablet Take 20 mg by mouth 2 times daily   Yes Historical Provider, MD   promethazine (PHENERGAN) 25 MG/ML injection Inject 6.25 mg into the muscle every 6 hours as needed (nausea)   Yes Historical Provider, MD   atorvastatin (LIPITOR) 20 MG tablet Take 20 mg by mouth nightly   Yes Historical Provider, MD   vitamin E 100 units capsule Take 100 Units by mouth daily   Yes Historical Provider, MD   aspirin (ASPIRIN 81) 81 MG EC tablet Take 81 mg by mouth daily   Yes Historical Provider, MD   levothyroxine (SYNTHROID) 75 MCG tablet Take 75 mcg by mouth Daily   Yes Historical Provider, MD ferrous sulfate (IRON 325) 325 (65 Fe) MG tablet Take 325 mg by mouth daily (with breakfast)   Yes Historical Provider, MD   amiodarone (CORDARONE) 200 MG tablet Take 400 mg by mouth nightly   Yes Historical Provider, MD   polyethylene glycol (GLYCOLAX) 17 g packet Take 34 g by mouth daily   Yes Historical Provider, MD   naloxegol (MOVANTIK) 25 MG TABS tablet Take 25 mg by mouth every morning (before breakfast)   Yes Historical Provider, MD   Balsam [place]-Castor Oil (VENELEX) OINT ointment Apply topically 2 times daily Apply to buttock   Yes Historical Provider, MD   gabapentin (NEURONTIN) 100 MG capsule Take 200 mg by mouth nightly. Yes Historical Provider, MD   senna (SENOKOT) 8.6 MG tablet Take 1 tablet by mouth nightly   Yes Historical Provider, MD   pantoprazole (PROTONIX) 40 MG tablet Take 40 mg by mouth 2 times daily   Yes Historical Provider, MD   docusate sodium (COLACE) 100 MG capsule Take 100 mg by mouth 2 times daily   Yes Historical Provider, MD   polycarbophil (FIBERCON) 625 MG tablet Take 625 mg by mouth 2 times daily   Yes Historical Provider, MD   tamsulosin (FLOMAX) 0.4 MG capsule Take 0.4 mg by mouth daily   Yes Historical Provider, MD   potassium chloride (KLOR-CON M) 10 MEQ extended release tablet Take 10 mEq by mouth 2 times daily   Yes Historical Provider, MD   HYDROcodone-acetaminophen (NORCO) 5-325 MG per tablet Take 1 tablet by mouth every 6 hours as needed for Pain.    Yes Historical Provider, MD   aluminum & magnesium hydroxide-simethicone (MAALOX) 200-200-20 MG/5ML SUSP suspension Take 30 mLs by mouth daily as needed for Indigestion   Yes Historical Provider, MD   albuterol sulfate HFA (PROVENTIL;VENTOLIN;PROAIR) 108 (90 Base) MCG/ACT inhaler Inhale 2 puffs into the lungs every 6 hours as needed for Wheezing   Yes Historical Provider, MD         Physical Exam:     /61   Pulse (!) 106   Temp 98.5 °F (36.9 °C) (Axillary)   Resp 30   Ht 5' 4\" (1.626 m)   Wt 110 lb (49.9 kg) SpO2 92%   BMI 18.88 kg/m²     General: NAD, ill appearing,   Eyes: EOMI  ENT: neck supple  Cardiovascular: tachcyardic, irregularly irregular rhythm  Respiratory: decreased breath sounds. Chest tube on right. Gastrointestinal: Soft, non tender  Genitourinary: no suprapubic tenderness  Musculoskeletal: 2+ pitting edema in bilateral lower extremities  Skin: warm, dry  Neuro: Alert. Following commands. Labs, Imaging, and Studies reviewed:    CT ABDOMEN PELVIS WO CONTRAST Additional Contrast? None    Result Date: 1/10/2023  EXAMINATION: CT OF THE ABDOMEN AND PELVIS WITHOUT CONTRAST 1/10/2023 1:42 pm TECHNIQUE: CT of the abdomen and pelvis was performed without the administration of intravenous contrast. Multiplanar reformatted images are provided for review. Automated exposure control, iterative reconstruction, and/or weight based adjustment of the mA/kV was utilized to reduce the radiation dose to as low as reasonably achievable. COMPARISON: None. HISTORY: ORDERING SYSTEM PROVIDED HISTORY: abdominal pain, hematemesis TECHNOLOGIST PROVIDED HISTORY: Reason for exam:->abdominal pain, hematemesis Additional Contrast?->None Decision Support Exception - unselect if not a suspected or confirmed emergency medical condition->Emergency Medical Condition (MA) Reason for Exam: abdominal pain, hematemesis FINDINGS: Lower Chest: There is a partially calcified soft tissue mass noted in the GE junction. It measures 10 x 6.6 x 9 cm. The appearance suggests a leiomyosarcoma or GIST. There are moderate bilateral pleural effusions with compressive atelectasis in the lung bases. Patient is status post sternotomy. Pacer leads are in good position. Organs: The the liver, pancreas appear unremarkable. There are calcified granulomas in the the spleen. The patient is status post cholecystectomy. The adrenal glands and kidneys are unremarkable. GI/Bowel: Small bowel loops appear unremarkable.   There is impaction of the rectum with dense barium. Pelvis: There is a Catalan catheter in the bladder. The patient is status post hysterectomy. Peritoneum/Retroperitoneum: Unremarkable Bones/Soft Tissues: Osteoarthritic changes are noted in lower lumbar facet joints. Large partially calcified mass noted in the region of the GE junction. It measures 10 x 6.6 x 9 cm. This may represent a leiomyosarcoma or GIST. Moderate bilateral pleural effusion with compressive atelectasis in lung bases. Dense barium impaction of the rectum. Patient is status post cholecystectomy and hysterectomy. Calcified granulomas noted in the spleen. Catalan catheter in the bladder. XR CHEST PORTABLE    Result Date: 1/10/2023  EXAMINATION: ONE XRAY VIEW OF THE CHEST 1/10/2023 11:50 am COMPARISON: None. HISTORY: ORDERING SYSTEM PROVIDED HISTORY: Chest pain TECHNOLOGIST PROVIDED HISTORY: Reason for exam:->Chest pain Reason for Exam: chest pain FINDINGS: There are postsurgical changes of median sternotomy. The heart size is enlarged. The pulmonary vasculature is within normal limits. There is increased density involving the lower lung zones bilaterally with blunting of the costophrenic angles. There is a left subclavian AICD. 1. Cardiomegaly without overt failure. 2. Small bilateral pleural effusions with lower lobe airspace disease. I would recommend follow-up to resolution.        CBC:   Recent Labs     01/10/23  1144 01/10/23  2025   WBC 31.7*  --    HGB 11.3* 10.6*     --      BMP:    Recent Labs     01/10/23  1144 01/10/23  2025    139   K 4.2 3.3*   CL 98* 101   CO2 9* 11*   BUN 4* 4*   CREATININE 0.6 0.5*   GLUCOSE 146* 180*     Hepatic:   Recent Labs     01/10/23  1144   AST 71*   ALT 38   BILITOT 0.5   ALKPHOS 84     Lipids: No results found for: CHOL, HDL, TRIG  Hemoglobin A1C: No results found for: LABA1C  TSH: No results found for: TSH  Troponin:   Lab Results   Component Value Date/Time    TROPONINT 0.013 01/10/2023 08:25 PM TROPONINT 0.019 01/10/2023 11:44 AM     Lactic Acid: No results for input(s): LACTA in the last 72 hours. BNP:   Recent Labs     01/10/23  1144   PROBNP 3,035*     UA:  Lab Results   Component Value Date/Time    NITRU NEGATIVE 01/10/2023 12:30 PM    COLORU YELLOW 01/10/2023 12:30 PM    WBCUA 104 01/10/2023 12:30 PM    RBCUA 13 01/10/2023 12:30 PM    MUCUS FEW 01/10/2023 12:30 PM    TRICHOMONAS NONE SEEN 01/10/2023 12:30 PM    BACTERIA FEW 01/10/2023 12:30 PM    CLARITYU SLIGHTLY CLOUDY 01/10/2023 12:30 PM    SPECGRAV >1.030 01/10/2023 12:30 PM    LEUKOCYTESUR SMALL NUMBER OR AMOUNT OBSERVED 01/10/2023 12:30 PM    UROBILINOGEN 0.2 01/10/2023 12:30 PM    BILIRUBINUR SMALL NUMBER OR AMOUNT OBSERVED 01/10/2023 12:30 PM    BLOODU MODERATE NUMBER OR AMOUNT OBSERVED 01/10/2023 12:30 PM    KETUA >80 01/10/2023 12:30 PM     Urine Cultures: No results found for: LABURIN  Blood Cultures: No results found for: BC  No results found for: BLOODCULT2  Organism: No results found for: [facility]      Critical care attestation:    I spent 65 cumulative minutes (excluding procedures), in full attention to this critically ill patient. I have personally reviewed the patient's history and interval events in the EMR, along with vitals, labs and radiology imaging. Critical care time was spent personally providing care for this patient, excluding billable procedures, and no overlapping with any other provider. This includes documenting my assessment and plan of care and developing the care plan to treat the problems below.     The high probability of deterioration required my full and direct attention, intervention, and personal management due to do to underlying diagnosis and clinical problems including the treatment of active or impending:  [] Neurological monitoring and treatment  [x] Respiratory failure -assessment of ventilator support, adjustment, ventilator weaning  [x] Hemodynamic and volume assessment with volume resuscitation  [] Mechanical and/or chemical support of the circulation,  [x] Frequent vasoactive agent adjustment,  [] Renal replacement therapy,  [x] For rapid decompensation,  [x] Electrolyte instability  [] Suctioning every 2 hours  [] Every hour neuro checks  [] Every hour neurovascular checks  [x] Frequent evaluation of patient's response to treatment and titration of therapies,  [x] Interpretation of laboratory and radiological data,  [x] Application and interpretation of advanced monitoring technologies,  [x] Extensive interpretation of multiple databases,  [x] Development of treatment plan with patient, surrogate, or consultants.   [] Others:     Electronically signed by Sammy Quiñones MD on 1/11/2023 at 2:25 AM

## 2023-10-26 NOTE — PROGRESS NOTES
"Chief Complaint  Procedure (Closure of rt thigh)    Subjective              History of Present Illness  Angelique Cordon is a 43 y.o. female who presents to [facility] for in office procedure for closure of right thigh wound.      Allergies: Patient has no known allergies.  Allergies Reconciled.    Review of Systems   All review of system has been reviewed and it  is negative except the ones note above.     Objective     Ht 152.4 cm (60\")   Wt 63 kg (139 lb)   BMI 27.15 kg/m²     Body mass index is 27.15 kg/m².    Physical Exam   Cardiovascular: Normal rate.     Pulmonary/Chest  Effort normal.       Cardiovascular: Normal rate    Pulmonary/Chest: Effort normal    Face:     Result Review :             Excision Procedure: Consent obtained. Local injected to site, Lidocaine 1% with epi.  Site prepped with ChloraPrep  in sterile fashion. Site draped in sterile fashion. Established hemostasis with direct pressure. Site was thoroughly irrigated. Site closed with 3-0 Vicryl  in a subcutaneous fashion to obliterate dead space, then with 3-0 Vicryl in two layers on the dermis . Site cleaned with sterile normal saline. The patient tolerated the procedure well with no immediate complications.   Wound size: 30 x 2 cm            Assessment and Plan      Diagnoses and all orders for this visit:    1. Dehiscence of operative wound, initial encounter (Primary)        Plan:  Keep incision clean, dry, and intact.   Pt has a virtual follow up Nov 1      Scribed by Summer Egan, acting as a scribe for Raffaele Willams MD, 10/26/23 09:40 EDT.  Raffaele Willams MD's signature on the note affirms that the note adequately documents the care provided.           Follow Up     No follow-ups on file.    Patient was given instructions and counseling regarding her condition. Please see specific information pulled into the AVS if appropriate.     Raffaele Willams MD  10/26/2023 "   Answers submitted by the patient for this visit:  Other (Submitted on 10/25/2023)  Please describe your symptoms.: Open wound from surgery , Passed out when i got up  Have you had these symptoms before?: No  How long have you been having these symptoms?: 1-4 days  Please describe any probable cause for these symptoms. : Surgery  Primary Reason for Visit (Submitted on 10/25/2023)  What is the primary reason for your visit?: Other

## 2023-10-29 ENCOUNTER — HOSPITAL ENCOUNTER (INPATIENT)
Facility: HOSPITAL | Age: 43
LOS: 1 days | Discharge: HOME | End: 2023-10-30
Attending: EMERGENCY MEDICINE | Admitting: INTERNAL MEDICINE
Payer: OTHER GOVERNMENT

## 2023-10-29 DIAGNOSIS — R57.8 HEMORRHAGIC SHOCK: ICD-10-CM

## 2023-10-29 DIAGNOSIS — D62 POSTOPERATIVE ANEMIA DUE TO ACUTE BLOOD LOSS: Primary | ICD-10-CM

## 2023-10-29 DIAGNOSIS — R55 SYNCOPE AND COLLAPSE: ICD-10-CM

## 2023-10-29 DIAGNOSIS — Z78.9 DECREASED ACTIVITIES OF DAILY LIVING (ADL): ICD-10-CM

## 2023-10-29 PROBLEM — T81.19XA POSTOPERATIVE HYPOVOLEMIC SHOCK: Status: ACTIVE | Noted: 2023-10-29

## 2023-10-29 PROBLEM — R58 BLEEDING: Status: ACTIVE | Noted: 2023-10-29

## 2023-10-29 LAB
ABO GROUP BLD: NORMAL
ABO GROUP BLD: NORMAL
ALBUMIN SERPL-MCNC: 3.4 G/DL (ref 3.5–5.2)
ALBUMIN/GLOB SERPL: 1.1 G/DL
ALP SERPL-CCNC: 47 U/L (ref 39–117)
ALT SERPL W P-5'-P-CCNC: 9 U/L (ref 1–33)
ANION GAP SERPL CALCULATED.3IONS-SCNC: 10.2 MMOL/L (ref 5–15)
AST SERPL-CCNC: 10 U/L (ref 1–32)
BACTERIA UR QL AUTO: ABNORMAL /HPF
BASOPHILS # BLD AUTO: 0.02 10*3/MM3 (ref 0–0.2)
BASOPHILS NFR BLD AUTO: 0.2 % (ref 0–1.5)
BILIRUB SERPL-MCNC: 0.3 MG/DL (ref 0–1.2)
BILIRUB UR QL STRIP: NEGATIVE
BLD GP AB SCN SERPL QL: NEGATIVE
BUN SERPL-MCNC: 8 MG/DL (ref 6–20)
BUN/CREAT SERPL: 12.5 (ref 7–25)
CALCIUM SPEC-SCNC: 9 MG/DL (ref 8.6–10.5)
CHLORIDE SERPL-SCNC: 102 MMOL/L (ref 98–107)
CLARITY UR: CLEAR
CO2 SERPL-SCNC: 25.8 MMOL/L (ref 22–29)
COLOR UR: YELLOW
CREAT SERPL-MCNC: 0.64 MG/DL (ref 0.57–1)
DEPRECATED RDW RBC AUTO: 45.3 FL (ref 37–54)
EGFRCR SERPLBLD CKD-EPI 2021: 112.6 ML/MIN/1.73
EOSINOPHIL # BLD AUTO: 0.05 10*3/MM3 (ref 0–0.4)
EOSINOPHIL NFR BLD AUTO: 0.5 % (ref 0.3–6.2)
ERYTHROCYTE [DISTWIDTH] IN BLOOD BY AUTOMATED COUNT: 13.2 % (ref 12.3–15.4)
GLOBULIN UR ELPH-MCNC: 3.2 GM/DL
GLUCOSE SERPL-MCNC: 120 MG/DL (ref 65–99)
GLUCOSE UR STRIP-MCNC: NEGATIVE MG/DL
HCT VFR BLD AUTO: 21.5 % (ref 34–46.6)
HGB BLD-MCNC: 7 G/DL (ref 12–15.9)
HGB UR QL STRIP.AUTO: ABNORMAL
HOLD SPECIMEN: NORMAL
HOLD SPECIMEN: NORMAL
HYALINE CASTS UR QL AUTO: ABNORMAL /LPF
IMM GRANULOCYTES # BLD AUTO: 0.04 10*3/MM3 (ref 0–0.05)
IMM GRANULOCYTES NFR BLD AUTO: 0.4 % (ref 0–0.5)
INR PPP: 1.09 (ref 0.86–1.15)
KETONES UR QL STRIP: NEGATIVE
LEUKOCYTE ESTERASE UR QL STRIP.AUTO: NEGATIVE
LYMPHOCYTES # BLD AUTO: 0.85 10*3/MM3 (ref 0.7–3.1)
LYMPHOCYTES NFR BLD AUTO: 8.5 % (ref 19.6–45.3)
MCH RBC QN AUTO: 31 PG (ref 26.6–33)
MCHC RBC AUTO-ENTMCNC: 32.6 G/DL (ref 31.5–35.7)
MCV RBC AUTO: 95.1 FL (ref 79–97)
MONOCYTES # BLD AUTO: 0.69 10*3/MM3 (ref 0.1–0.9)
MONOCYTES NFR BLD AUTO: 6.9 % (ref 5–12)
NEUTROPHILS NFR BLD AUTO: 8.3 10*3/MM3 (ref 1.7–7)
NEUTROPHILS NFR BLD AUTO: 83.5 % (ref 42.7–76)
NITRITE UR QL STRIP: NEGATIVE
NRBC BLD AUTO-RTO: 0 /100 WBC (ref 0–0.2)
PH UR STRIP.AUTO: 8 [PH] (ref 5–8)
PLATELET # BLD AUTO: 348 10*3/MM3 (ref 140–450)
PMV BLD AUTO: 10.3 FL (ref 6–12)
POTASSIUM SERPL-SCNC: 3.7 MMOL/L (ref 3.5–5.2)
PROT SERPL-MCNC: 6.6 G/DL (ref 6–8.5)
PROT UR QL STRIP: NEGATIVE
PROTHROMBIN TIME: 14.2 SECONDS (ref 11.8–14.9)
QT INTERVAL: 269 MS
QTC INTERVAL: 374 MS
RBC # BLD AUTO: 2.26 10*6/MM3 (ref 3.77–5.28)
RBC # UR STRIP: ABNORMAL /HPF
REF LAB TEST METHOD: ABNORMAL
RH BLD: POSITIVE
RH BLD: POSITIVE
SODIUM SERPL-SCNC: 138 MMOL/L (ref 136–145)
SP GR UR STRIP: 1.01 (ref 1–1.03)
SQUAMOUS #/AREA URNS HPF: ABNORMAL /HPF
T&S EXPIRATION DATE: NORMAL
UROBILINOGEN UR QL STRIP: ABNORMAL
WBC # UR STRIP: ABNORMAL /HPF
WBC NRBC COR # BLD: 9.95 10*3/MM3 (ref 3.4–10.8)
WHOLE BLOOD HOLD COAG: NORMAL
WHOLE BLOOD HOLD SPECIMEN: NORMAL

## 2023-10-29 PROCEDURE — 81001 URINALYSIS AUTO W/SCOPE: CPT | Performed by: INTERNAL MEDICINE

## 2023-10-29 PROCEDURE — 85610 PROTHROMBIN TIME: CPT | Performed by: EMERGENCY MEDICINE

## 2023-10-29 PROCEDURE — 86850 RBC ANTIBODY SCREEN: CPT | Performed by: EMERGENCY MEDICINE

## 2023-10-29 PROCEDURE — 93010 ELECTROCARDIOGRAM REPORT: CPT | Performed by: INTERNAL MEDICINE

## 2023-10-29 PROCEDURE — 85025 COMPLETE CBC W/AUTO DIFF WBC: CPT | Performed by: EMERGENCY MEDICINE

## 2023-10-29 PROCEDURE — 80053 COMPREHEN METABOLIC PANEL: CPT | Performed by: EMERGENCY MEDICINE

## 2023-10-29 PROCEDURE — 86900 BLOOD TYPING SEROLOGIC ABO: CPT | Performed by: EMERGENCY MEDICINE

## 2023-10-29 PROCEDURE — 87086 URINE CULTURE/COLONY COUNT: CPT | Performed by: INTERNAL MEDICINE

## 2023-10-29 PROCEDURE — 25810000003 SODIUM CHLORIDE 0.9 % SOLUTION: Performed by: EMERGENCY MEDICINE

## 2023-10-29 PROCEDURE — 99285 EMERGENCY DEPT VISIT HI MDM: CPT

## 2023-10-29 PROCEDURE — 93005 ELECTROCARDIOGRAM TRACING: CPT | Performed by: EMERGENCY MEDICINE

## 2023-10-29 PROCEDURE — 86901 BLOOD TYPING SEROLOGIC RH(D): CPT | Performed by: EMERGENCY MEDICINE

## 2023-10-29 RX ORDER — ACETAMINOPHEN 325 MG/1
650 TABLET ORAL EVERY 4 HOURS PRN
Status: DISCONTINUED | OUTPATIENT
Start: 2023-10-29 | End: 2023-10-31 | Stop reason: HOSPADM

## 2023-10-29 RX ORDER — KETOROLAC TROMETHAMINE 15 MG/ML
30 INJECTION, SOLUTION INTRAMUSCULAR; INTRAVENOUS EVERY 6 HOURS PRN
Status: DISCONTINUED | OUTPATIENT
Start: 2023-10-29 | End: 2023-10-31 | Stop reason: HOSPADM

## 2023-10-29 RX ORDER — ONDANSETRON 4 MG/1
4 TABLET, FILM COATED ORAL EVERY 6 HOURS PRN
Status: DISCONTINUED | OUTPATIENT
Start: 2023-10-29 | End: 2023-10-31 | Stop reason: HOSPADM

## 2023-10-29 RX ORDER — SODIUM CHLORIDE 0.9 % (FLUSH) 0.9 %
10 SYRINGE (ML) INJECTION EVERY 12 HOURS SCHEDULED
Status: DISCONTINUED | OUTPATIENT
Start: 2023-10-29 | End: 2023-10-31 | Stop reason: HOSPADM

## 2023-10-29 RX ORDER — NALOXONE HCL 0.4 MG/ML
0.4 VIAL (ML) INJECTION
Status: DISCONTINUED | OUTPATIENT
Start: 2023-10-29 | End: 2023-10-31 | Stop reason: HOSPADM

## 2023-10-29 RX ORDER — CALCIUM CARBONATE 500 MG/1
2 TABLET, CHEWABLE ORAL 2 TIMES DAILY PRN
Status: DISCONTINUED | OUTPATIENT
Start: 2023-10-29 | End: 2023-10-31 | Stop reason: HOSPADM

## 2023-10-29 RX ORDER — POLYETHYLENE GLYCOL 3350 17 G/17G
17 POWDER, FOR SOLUTION ORAL DAILY PRN
Status: DISCONTINUED | OUTPATIENT
Start: 2023-10-29 | End: 2023-10-31 | Stop reason: HOSPADM

## 2023-10-29 RX ORDER — TRAMADOL HYDROCHLORIDE 50 MG/1
50 TABLET ORAL EVERY 6 HOURS PRN
Status: DISCONTINUED | OUTPATIENT
Start: 2023-10-29 | End: 2023-10-31 | Stop reason: HOSPADM

## 2023-10-29 RX ORDER — SODIUM CHLORIDE 0.9 % (FLUSH) 0.9 %
10 SYRINGE (ML) INJECTION AS NEEDED
Status: DISCONTINUED | OUTPATIENT
Start: 2023-10-29 | End: 2023-10-31 | Stop reason: HOSPADM

## 2023-10-29 RX ORDER — ACETAMINOPHEN 500 MG
1000 TABLET ORAL EVERY 6 HOURS PRN
Status: DISCONTINUED | OUTPATIENT
Start: 2023-10-29 | End: 2023-10-31 | Stop reason: HOSPADM

## 2023-10-29 RX ORDER — CHOLECALCIFEROL (VITAMIN D3) 125 MCG
5 CAPSULE ORAL NIGHTLY
Status: DISCONTINUED | OUTPATIENT
Start: 2023-10-29 | End: 2023-10-31 | Stop reason: HOSPADM

## 2023-10-29 RX ORDER — ACETAMINOPHEN 160 MG/5ML
650 SOLUTION ORAL EVERY 4 HOURS PRN
Status: DISCONTINUED | OUTPATIENT
Start: 2023-10-29 | End: 2023-10-31 | Stop reason: HOSPADM

## 2023-10-29 RX ORDER — ACETAMINOPHEN 650 MG/1
650 SUPPOSITORY RECTAL EVERY 4 HOURS PRN
Status: DISCONTINUED | OUTPATIENT
Start: 2023-10-29 | End: 2023-10-31 | Stop reason: HOSPADM

## 2023-10-29 RX ORDER — ALUMINA, MAGNESIA, AND SIMETHICONE 2400; 2400; 240 MG/30ML; MG/30ML; MG/30ML
15 SUSPENSION ORAL EVERY 6 HOURS PRN
Status: DISCONTINUED | OUTPATIENT
Start: 2023-10-29 | End: 2023-10-31 | Stop reason: HOSPADM

## 2023-10-29 RX ORDER — GABAPENTIN 300 MG/1
300 CAPSULE ORAL 3 TIMES DAILY
Status: DISCONTINUED | OUTPATIENT
Start: 2023-10-29 | End: 2023-10-31 | Stop reason: HOSPADM

## 2023-10-29 RX ORDER — BISACODYL 10 MG
10 SUPPOSITORY, RECTAL RECTAL DAILY PRN
Status: DISCONTINUED | OUTPATIENT
Start: 2023-10-29 | End: 2023-10-31 | Stop reason: HOSPADM

## 2023-10-29 RX ORDER — AMOXICILLIN 250 MG
2 CAPSULE ORAL 2 TIMES DAILY
Status: DISCONTINUED | OUTPATIENT
Start: 2023-10-29 | End: 2023-10-31 | Stop reason: HOSPADM

## 2023-10-29 RX ORDER — FAMOTIDINE 20 MG/1
20 TABLET, FILM COATED ORAL DAILY
Status: DISCONTINUED | OUTPATIENT
Start: 2023-10-29 | End: 2023-10-31 | Stop reason: HOSPADM

## 2023-10-29 RX ORDER — ONDANSETRON 2 MG/ML
4 INJECTION INTRAMUSCULAR; INTRAVENOUS EVERY 6 HOURS PRN
Status: DISCONTINUED | OUTPATIENT
Start: 2023-10-29 | End: 2023-10-31 | Stop reason: HOSPADM

## 2023-10-29 RX ORDER — SODIUM CHLORIDE 9 MG/ML
40 INJECTION, SOLUTION INTRAVENOUS AS NEEDED
Status: DISCONTINUED | OUTPATIENT
Start: 2023-10-29 | End: 2023-10-31 | Stop reason: HOSPADM

## 2023-10-29 RX ORDER — BISACODYL 5 MG/1
5 TABLET, DELAYED RELEASE ORAL DAILY PRN
Status: DISCONTINUED | OUTPATIENT
Start: 2023-10-29 | End: 2023-10-31 | Stop reason: HOSPADM

## 2023-10-29 RX ADMIN — ACETAMINOPHEN 1000 MG: 500 TABLET ORAL at 16:44

## 2023-10-29 RX ADMIN — Medication 5 MG: at 21:01

## 2023-10-29 RX ADMIN — DOCUSATE SODIUM 50MG AND SENNOSIDES 8.6MG 2 TABLET: 8.6; 5 TABLET, FILM COATED ORAL at 21:01

## 2023-10-29 RX ADMIN — GABAPENTIN 300 MG: 300 CAPSULE ORAL at 16:44

## 2023-10-29 RX ADMIN — GABAPENTIN 300 MG: 300 CAPSULE ORAL at 21:01

## 2023-10-29 RX ADMIN — FAMOTIDINE 20 MG: 20 TABLET, FILM COATED ORAL at 16:43

## 2023-10-29 RX ADMIN — SODIUM CHLORIDE 1000 ML: 9 INJECTION, SOLUTION INTRAVENOUS at 13:03

## 2023-10-29 RX ADMIN — Medication 10 ML: at 21:01

## 2023-10-29 NOTE — ED PROVIDER NOTES
Time: 1:09 PM EDT  Date of encounter:  10/29/2023  Independent Historian/Clinical History and Information was obtained by:   Patient    History is limited by: N/A    Chief Complaint: Postoperative bleeding          History of Present Illness:  Patient is a 43 y.o. year old female who presents to the emergency department for evaluation of postoperative bleeding following plastic surgery she had 5 days ago for lipedema of her right lower extremity.    She had some bleeding upon discharge home from the surgery and some wound dehiscence and had to follow-up with her surgeon in the next day or 2 to get some sutures placed.    She has continued to have oozing of blood mostly from the incision site along the right lateral buttock area.    She is not on any blood thinning medications.    She does state that she is felt generally unwell and very weak and actually passed out at home.    She has been trying to eat and drink plenty of liquids and rest and has been unable to figure out why she feels so bad and was noted to have low blood pressure of 90s over 39 on arrival and heart rate of 128 bpm, concerning for hemorrhagic shock.    She denies any fevers or signs of illness, but is noted to have a indwelling Green urinary catheter..        HPI    Patient Care Team  Primary Care Provider: Hannah Parrish APRN    Past Medical History:     No Known Allergies  Past Medical History:   Diagnosis Date    Abnormal bone density screening     Anxiety     Lipedema of lower extremity     LEGS AND ARMS    Pap smear for cervical cancer screening 2018    Varicose vein of leg     Visit for screening mammogram      Past Surgical History:   Procedure Laterality Date    EXCISION LESION Left 4/1/2022    Procedure: EXCISION CYST LEFT AXILLARY;  Surgeon: Rosalie Talley MD;  [place] [facility] [place] [facility] [place]  Service: General;  Laterality: Left;    FOOT SURGERY Left 2003/2009 HAD 3 DIFFERENT SURGERIES    L FOOT/TENDON RELEASE PLANTAR FASIA     LIPOSUCTION Right 10/24/2023    Procedure: LIPOSUCTION, Right Lower Extremity Circumferential Liposuction with Skin Resection and negative pressure dressing;  Surgeon: Raffaele Willams MD;  Location: [facility] OR [facility] [place];  Service: Plastics;  Laterality: Right;     Family History   Problem Relation Age of Onset    Diabetes Mother     Sleep apnea Mother     COPD Mother     Hypertension Mother     Colon cancer Father     Malig Hyperthermia Neg Hx        Home Medications:  Prior to Admission medications    Medication Sig Start Date End Date Taking? Authorizing Provider   acetaminophen (TYLENOL) 500 MG tablet Take 2 tablets by mouth Every 6 (Six) Hours As Needed for Moderate Pain for up to 18 doses. 10/13/23   Raffaele Willams MD   gabapentin (NEURONTIN) 300 MG capsule Take 1 capsule by mouth 3 (Three) Times a Day. POST OP PAIN PER YUKO    Provider, MD Michael   ibuprofen (ADVIL,MOTRIN) 800 MG tablet Take 1 tablet by mouth Every 8 (Eight) Hours As Needed for Mild Pain. 1/16/23   Maira Oviedo APRN   Melatonin 10 MG/ML liquid Take 5 mg by mouth Every Night.    Provider, MD Michael   ondansetron (Zofran) 4 MG tablet Take 1 tablet by mouth Every 6 (Six) Hours As Needed for Nausea or Vomiting. 10/13/23 10/12/24  Raffaele Willams MD   Semaglutide-Weight Management (Wegovy) 0.25 MG/0.5ML solution auto-injector Inject 0.25 mg under the skin into the appropriate area as directed 1 (One) Time Per Week. 10/24/23   Hannah Parrish APRN   Vortioxetine HBr (Trintellix) 20 MG tablet Take 1 tablet by mouth Daily. 10/5/23   Hannah Parrish APRN        Social History:   Social History     Tobacco Use    Smoking status: Never    Smokeless tobacco: Never   Vaping Use    Vaping Use: Never used   Substance Use Topics    Alcohol use: Never    Drug use: Never         Review of Systems:  Review of Systems   I performed a 10 point review of systems which was all negative, except for the positives  "found in the HPI above.      Physical Exam:  /59 (BP Location: Right arm, Patient Position: Lying)   Pulse 100   Temp 98.7 °F (37.1 °C) (Oral)   Resp 16   Ht 152.4 cm (60\")   Wt 62.2 kg (137 lb 2 oz)   LMP 10/17/2023   SpO2 97%   BMI 26.78 kg/m²     Physical Exam   General: Awake alert and appears very pale and somewhat ill-appearing      HEENT: Head normocephalic atraumatic, eyes PERRLA EOMI, nose normal, oropharynx normal.  Lips pale.    Neck: Supple full range of motion, no meningismus, no lymphadenopathy    Heart: Tachycardic with a regular rhythm, no murmurs or rubs, 2+ radial pulses bilaterally    Lungs: Clear to auscultation bilaterally without wheezes or crackles, no respiratory distress    Abdomen: Soft, nontender, nondistended, no rebound or guarding    Skin: Warm, dry, no rash; skin pallor noted    Musculoskeletal: Normal range of motion, she has a large, sutured incision running up the right thigh towards the buttock with some mild amount of dark blood oozing from the right buttock incision site and mild tenderness around the site but no obvious sign of infection or erythema or purulent drainage.    Neurologic: Oriented x3, no motor deficits no sensory deficits    Psychiatric: Mood appears stable, no psychosis          Procedures:  Procedures      Medical Decision Making:      Comorbidities that affect care:    Lipedema of lower extremity    External Notes reviewed:    Previous Operation Note: I reviewed her recent plastic surgery operative note for her lipedema of the right lower extremity.      The following orders were placed and all results were independently analyzed by me:  Orders Placed This Encounter   Procedures    Urine Culture - Urine,    Comprehensive Metabolic Panel    [place] Draw    CBC Auto Differential    Protime-INR    Urinalysis With Culture If Indicated - Urine, Clean Catch    CBC Auto Differential    Iron Profile    Ferritin    Reticulocytes    Urinalysis, Microscopic " Only - Urine, Clean Catch    Diet: Regular/House Diet; Texture: Regular Texture (IDDSI 7); Fluid Consistency: Thin (IDDSI 0)    Verify Informed Consent for Blood Product Administration    Vital Signs    Activity - Ad Melissa    Advance Diet As Tolerated -    Intake & Output    Weigh Patient    Daily Weights    Oral Care    Saline Lock & Maintain IV Access    Place Sequential Compression Device    Maintain Sequential Compression Device    Up in Chair    Continue Indwelling Urinary Catheter    Remote Cardiac Monitor    Verify Informed Consent for Blood Product Administration    RN To Enter The Following Labs When Transfusion Complete    Discontinue Indwelling Urinary Catheter    Bladder Scan if Patient Unable to Void 4-6 Hours After Catheter Removal    If Bladder Scan Volume is Less Than 500mL & Patient is Without Symptoms of Bladder Discomfort / Distention Monitor Every 1-2 Hours for Spontaneous Void    Straight Cath Every 4-6 Hours As Needed If Patient is Unable to Void After 4-6 Hours, Bladder Scan Volume is Greater Than 500mL & Patient Has Symptoms of Bladder Discomfort / Distention    Notify Provider if Bladder Distention Continues    Consult Pharmacist For Review of Medications That May Cause Urinary Retention - RN To Place Order for Consult it Needed    Schedule / Prompt Voiding For Patients With Urinary Incontinence    Ambulate In [place]    Discontinue Telemetry    Discharge Follow-up with PCP    Discharge Follow-up with Specialty: Plastic surgery    Activity as Tolerated    Diet: Regular/House Diet; Thin (IDDSI 0)    Discharge Dressing / Wound Instructions    Discharge Instructions    Code Status and Medical Interventions:    Hospitalist (on-call MD unless specified)    IP General Consult (Use specialty-specific consult if known)    Inpatient Case Management  Consult    OT Consult: Eval & Treat    PT Consult: Eval & Treat Discharge Placement Assessment    Incentive Spirometry    ECG 12 Lead Syncope     Type & Screen    Prepare RBC, 2 Units    ABO RH Specimen Verification    Prepare RBC, 1 Units    Wound Ostomy Eval & Treat    Insert Peripheral IV    Insert 2nd peripheral IV    Insert Peripheral IV    Insert 2nd peripheral IV    Insert Peripheral IV    Discontinue IV    Inpatient Admission    Discharge patient    CBC & Differential    Green Top (Gel)    Lavender Top    Gold Top - SST    Light Blue Top       Medications Given in the Emergency Department:  Medications   sodium chloride 0.9 % flush 10 mL (has no administration in time range)   sodium chloride 0.9 % flush 10 mL (has no administration in time range)   acetaminophen (TYLENOL) tablet 1,000 mg (1,000 mg Oral Given 10/30/23 1634)   gabapentin (NEURONTIN) capsule 300 mg (300 mg Oral Given 10/30/23 1629)   melatonin tablet 5 mg (5 mg Oral Given 10/29/23 2101)   ondansetron (ZOFRAN) tablet 4 mg (has no administration in time range)   Vortioxetine HBr (TRINTELLIX) tablet 20 mg (20 mg Oral Given 10/30/23 0944)   sodium chloride 0.9 % flush 10 mL (10 mL Intravenous Given 10/30/23 0945)   sodium chloride 0.9 % flush 10 mL (has no administration in time range)   sodium chloride 0.9 % infusion 40 mL (has no administration in time range)   acetaminophen (TYLENOL) tablet 650 mg (650 mg Oral Given 10/30/23 0942)     Or   acetaminophen (TYLENOL) 160 MG/5ML oral solution 650 mg ( Oral Not Given:  See Alt 10/30/23 0942)     Or   acetaminophen (TYLENOL) suppository 650 mg ( Rectal Not Given:  See Alt 10/30/23 0942)   ketorolac (TORADOL) injection 30 mg (has no administration in time range)   HYDROmorphone (DILAUDID) injection 0.5 mg (has no administration in time range)     And   naloxone (NARCAN) injection 0.4 mg (has no administration in time range)   calcium carbonate (TUMS) chewable tablet 500 mg (200 mg elemental) (has no administration in time range)   aluminum-magnesium hydroxide-simethicone (MAALOX MAX) 400-400-40 MG/5ML suspension 15 mL (has no administration  in time range)   famotidine (PEPCID) tablet 20 mg (20 mg Oral Given 10/30/23 0944)   sennosides-docusate (PERICOLACE) 8.6-50 MG per tablet 2 tablet (2 tablets Oral Not Given 10/30/23 0953)     And   polyethylene glycol (MIRALAX) packet 17 g (has no administration in time range)     And   bisacodyl (DULCOLAX) EC tablet 5 mg (has no administration in time range)     And   bisacodyl (DULCOLAX) suppository 10 mg (has no administration in time range)   ondansetron (ZOFRAN) injection 4 mg (has no administration in time range)   Potassium Replacement - Follow Nurse / BPA Driven Protocol (has no administration in time range)   Magnesium Standard Dose Replacement - Follow Nurse / BPA Driven Protocol (has no administration in time range)   Phosphorus Replacement - Follow Nurse / BPA Driven Protocol (has no administration in time range)   Calcium Replacement - Follow Nurse / BPA Driven Protocol (has no administration in time range)   traMADol (ULTRAM) tablet 50 mg (has no administration in time range)   lidocaine (XYLOCAINE) 1 % injection 10 mL (10 mL Infiltration Not Given 10/30/23 0900)   ferric gluconate (FERRLECIT) 250 MG in sodium chloride 0.9% 250 mL IVPB (250 mg Intravenous New Bag 10/30/23 1138)   sodium chloride 0.9 % bolus 1,000 mL (0 mL Intravenous Stopped 10/29/23 1345)   ketorolac (TORADOL) injection 30 mg (30 mg Intravenous Given 10/30/23 1351)        ED Course:         Labs:    Lab Results (last 24 hours)       Procedure Component Value Units Date/Time    Reticulocytes [[id number]]  (Abnormal) Collected: 10/30/23 0109    Specimen: Blood from Hand, Left Updated: 10/30/23 0125     Reticulocyte % 2.93 %      Reticulocyte Absolute 0.0785 10*6/mm3     CBC Auto Differential [[id number]]  (Abnormal) Collected: 10/30/23 0109    Specimen: Blood from Hand, Left Updated: 10/30/23 0125     WBC 9.40 10*3/mm3      RBC 2.68 10*6/mm3      Hemoglobin 8.3 g/dL      Hematocrit 24.7 %      MCV 92.2 fL      MCH 31.0 pg      MCHC 33.6  g/dL      RDW 13.9 %      RDW-SD 46.9 fl      MPV 10.1 fL      Platelets 268 10*3/mm3      Neutrophil % 77.4 %      Lymphocyte % 11.6 %      Monocyte % 8.9 %      Eosinophil % 1.3 %      Basophil % 0.3 %      Immature Grans % 0.5 %      Neutrophils, Absolute 7.27 10*3/mm3      Lymphocytes, Absolute 1.09 10*3/mm3      Monocytes, Absolute 0.84 10*3/mm3      Eosinophils, Absolute 0.12 10*3/mm3      Basophils, Absolute 0.03 10*3/mm3      Immature Grans, Absolute 0.05 10*3/mm3      nRBC 0.0 /100 WBC     Iron Profile [[id number]]  (Abnormal) Collected: 10/30/23 0109    Specimen: Blood from Hand, Left Updated: 10/30/23 0144     Iron 15 mcg/dL      Iron Saturation (TSAT) 9 %      Transferrin 113 mg/dL      TIBC 168 mcg/dL     Ferritin [[id number]]  (Abnormal) Collected: 10/30/23 0109    Specimen: Blood from Hand, Left Updated: 10/30/23 0150     Ferritin 185.50 ng/mL     Narrative:      <12 ng/mL usually associated with Iron Deficiency Anemia. Above normal range levels may be due to Hepatic and/or Chronic Inflammatory Disease.  Results may be falsely decreased if patient taking Biotin.               Imaging:    No Radiology Exams Resulted Within Past 24 Hours      Differential Diagnosis and Discussion:    Weakness: Based on the patient's history, signs, and symptoms, the diffential diagnosis includes but is not limited to meningitis, stroke, sepsis, subarachnoid hemorrhage, intracranial bleeding, encephalitis, acute uti, dehydration, MS, myasthenia gravis, [place], migraine variant, neuromuscular disorders vertigo, electrolyte imbalance, and metabolic disorders.    All labs were reviewed and interpreted by me.    MDM     Amount and/or Complexity of Data Reviewed  Decide to obtain previous medical records or to obtain history from someone other than the patient: yes             This patient is a pleasant 43-year-old female with history of right lower extremity lipedema now postoperative day 5 from plastic surgery of the  right lower extremity now presenting with continued oozing of blood from her incision site and associated general weakness and syncopal episode at home.    On arrival to the ED she is hypotensive with blood pressure of 90s over 39, and tachycardic with heart rate of 128 bpm.      I am concerned initially for hemorrhagic shock from postoperative blood loss and giving her IV fluids in the ED and sending off blood work and type and screen.    Her baseline hemoglobin level was 14 the other year and today it is 7.0.    I do not see any brisk bleeding but I am concerned there may be a underlying venous bleeding from her surgery that is deep to the closed incision site and something I am not able to visualize.    I have ordered 2 units of packed red blood cells for blood transfusion and, given that she is in hemorrhagic shock, we will plan to admit her to the hospital and consult with her plastic surgeon.        Critical Care Note: Total Critical Care time of 35 minutes. Total critical care time documented does not include time spent on separately billed procedures for services of nurses or physician assistants. I personally saw and examined the patient. I have reviewed all diagnostic interpretations and treatment plans as written. I was present for the key portions of any procedures performed and the inclusive time noted in any critical care statement. Critical care time includes patient management by me, time spent at the patients bedside,  time to review lab and imaging results, discussing patient care, documentation in the medical record, and time spent with family or caregiver.    Patient Care Considerations:          Consultants/Shared Management Plan:    Consultant: I have discussed the case with the patient's plastic surgeon, who agrees to consult on the patient.    Social Determinants of Health:    Patient is independent, reliable, and has access to care.       Disposition and Care Coordination:    Admit:    Through independent evaluation of the patient's history, physical, and imperical data, the patient meets criteria for observation/admission to the hospital.        Final diagnoses:   Postoperative anemia due to acute blood loss   Hemorrhagic shock   Syncope and collapse        ED Disposition       ED Disposition   Decision to Admit    Condition   --    Comment   Level of Care: Remote Telemetry [26]   Diagnosis: Acute blood loss anemia [211787]   Admitting Physician: OLGA MORIN [[id number]]   Attending Physician: OLGA MORIN [[id number]]   Isolate for COVID?: No [0]   Certification: I Certify That Inpatient Hospital Services Are Medically Necessary For Greater Than 2 Midnights                 This medical record created using voice recognition software.             Pablo Smyth MD  10/30/23 8555

## 2023-10-29 NOTE — H&P
[facility]  Date: 10/29/2023   Patient Name: Angelique Cordon  : 1980  MRN: [id number]  Primary Care Physician:  Hannah Parrish APRN  Date of admission: 10/29/2023    Subjective   Subjective     Chief Complaint: Postop bleeding from right leg incision off and on since .  Feeling weak and fatigued    HPI:    Angelique Cordon is a 43 y.o. female with past medical history of lipedema, varicose veins and anxiety disorder.  Patient had liposuction and revision excision of skin of right lower extremity on  by plastic surgery.  Patient had wound dehiscence postop with bleeding and was seen by plastic surgery with placement of extra stitches on .  Patient while taking shower on  passed out briefly after she felt dizzy.  Her bleeding stopped for a day.  It recurred last night and is dark red blood oozing from her right lateral posterior proximal thigh area.  She was advised to come to ED.  Work-up in the ED showed a blood pressure of 90/39 with heart rate of 126.  Patient got a liter of fluid with blood pressure improving to 111/63 and heart rate improving 117.  Hemoglobin of 7.  Previous hemoglobin of 14 on 2020.  No baseline hemoglobin available.  Platelet and INR is within normal range  Patient denies bleeding from anywhere else like vomiting blood, black-colored stools or blood in stools or heavy.  No bleeding with a urine.  Patient denies any abdominal pain.  Does take ibuprofen about 4-5 times per week on average.  No history of previous bleeding..  Patient has been feeling weak, tired and fatigued for past few days.  ED physician consulted plastic surger and recommendation is no intervention at this time except transfusing blood and symptomatic support.  Hospice has been called to admit her for further evaluation.  Patient denies any other complaints like chest pain or shortness of breath no fever or chills.    Personal  History     Past Medical History:  Past Medical History:   Diagnosis Date    Abnormal bone density screening     Anxiety     Lipedema of lower extremity     LEGS AND ARMS    Pap smear for cervical cancer screening 2018    Varicose vein of leg     Visit for screening mammogram        Past Surgical History:  Past Surgical History:   Procedure Laterality Date    EXCISION LESION Left 4/1/2022    Procedure: EXCISION CYST LEFT AXILLARY;  Surgeon: Rosalie Talley MD;  Location: [facility];  Service: General;  Laterality: Left;    FOOT SURGERY Left 2003/2009 HAD 3 DIFFERENT SURGERIES    L FOOT/TENDON RELEASE PLANTAR FASIA    LIPOSUCTION Right 10/24/2023    Procedure: LIPOSUCTION, Right Lower Extremity Circumferential Liposuction with Skin Resection and negative pressure dressing;  Surgeon: Raffaele Willams MD;  Location: [facility];  Service: Plastics;  Laterality: Right;       Family History:   family history includes COPD in her mother; Colon cancer in her father; Diabetes in her mother; Hypertension in her mother; Sleep apnea in her mother.    Social History:    reports that she has never smoked. She has never used smokeless tobacco. She reports that she does not drink alcohol and does not use drugs.    Home Medications:  Melatonin, Semaglutide-Weight Management, Vortioxetine HBr, acetaminophen, gabapentin, ibuprofen, and ondansetron    Allergies:  No Known Allergies    Review of Systems   All systems were reviewed and negative except for: Summary interval follow-up    Objective   Objective     Vitals:   Temp:  [98.5 °F (36.9 °C)] 98.5 °F (36.9 °C)  Heart Rate:  [117-128] 119  Resp:  [18] 18  BP: ()/(39-63) 111/63    Physical Exam    Constitutional: Awake, alert, no acute distress.  Pale looking   Eyes: Pupils equal, sclerae anicteric, no conjunctival injection   HENT: NCAT, mucous membranes moist   Neck: Supple, no thyromegaly, no lymphadenopathy, trachea midline   Respiratory: Clear to  auscultation bilaterally, nonlabored respirations    Cardiovascular: RRR, no murmurs, rubs, or gallops, palpable pedal pulses bilaterally   Gastrointestinal: Positive bowel sounds, soft, nontender, nondistended   Musculoskeletal: No bilateral ankle edema, no clubbing or cyanosis to extremities   Psychiatric: Appropriate affect, cooperative   Neurologic: Oriented x 3, strength symmetric in all extremities except right lower extremity not tested because of recent surgery, Cranial Nerves grossly intact to confrontation, speech clear   Skin: No rashes.  Postsurgical incisions are healing well with oozing dark blood from 2 spots in the right lateral posterior proximal thigh area.  Stitches intact.  Right lower extremity is dressed.  Genitourinary has Green catheter    Result Review    Result Review:  I have personally reviewed the results from the time of this admission to 10/29/2023 15:40 EDT and agree with these findings:  [x]  Laboratory  []  Microbiology  []  Radiology  []  EKG/Telemetry   []  Cardiology/Vascular   []  Pathology  [x]  Old records  [x]  Other: Medications      Assessment & Plan   Assessment / Plan     Assessment:  Hypovolemic shock.  Acute blood loss anemia.  S/p 2 units packed RBC.  symptomatic anemia.  Syncope and fatigue due to above  Postop bleeding from right leg incision site.  Lipedema s/p liposuction and revision excision of the skin on October 24 by plastic surgery .  Anxiety disorder  Indwelling Green catheter postop    Plan:   Monitor H&H transfuse hemoglobin less than 7.  Wound dressing.  Wound nurse consulted  Plastic surgery to evaluate patient.  Check iron profile.  Protonix.  Continue Green catheter.  SCD.  Pain control.  Bowel regimen   PT OT.  Discussed with ED physician and nursing staff.      DVT prophylaxis:  Mechanical DVT prophylaxis orders are present.  SCD    CODE STATUS:    Code Status (Patient has no pulse and is not breathing): CPR (Attempt to Resuscitate)  Medical  Interventions (Patient has pulse or is breathing): Full Support      Admission Status:  I believe this patient meets inpatient status.    Part of this note may be an electronic transcription/translation of spoken language to printed text using the Dragon Dictation System.     Electronically signed by Holland Diaz MD, 10/29/23, 3:40 PM EDT.

## 2023-10-29 NOTE — PLAN OF CARE
Goal Outcome Evaluation:  Plan of Care Reviewed With: patient        Progress: improving  Outcome Evaluation: Pt arrived to floor from ED. Pt A&O x 4, VSS, slightly tachy, on flex monitor. No c/o pain. Wound ANGIE. Order to transfuse 2 units of PRBCs, first unit started and currently infusing. Green draining urine to gravity.

## 2023-10-30 ENCOUNTER — READMISSION MANAGEMENT (OUTPATIENT)
Dept: CALL CENTER | Facility: HOSPITAL | Age: 43
End: 2023-10-30
Payer: OTHER GOVERNMENT

## 2023-10-30 VITALS
HEART RATE: 95 BPM | DIASTOLIC BLOOD PRESSURE: 53 MMHG | WEIGHT: 137.13 LBS | SYSTOLIC BLOOD PRESSURE: 94 MMHG | BODY MASS INDEX: 26.92 KG/M2 | HEIGHT: 60 IN | OXYGEN SATURATION: 100 % | RESPIRATION RATE: 16 BRPM | TEMPERATURE: 98.1 F

## 2023-10-30 PROBLEM — T81.19XA POSTOPERATIVE HYPOVOLEMIC SHOCK: Status: RESOLVED | Noted: 2023-10-29 | Resolved: 2023-10-30

## 2023-10-30 PROBLEM — T81.31XA SURGICAL WOUND DEHISCENCE: Status: ACTIVE | Noted: 2023-10-30

## 2023-10-30 PROBLEM — D62 ACUTE BLOOD LOSS ANEMIA: Status: RESOLVED | Noted: 2023-10-29 | Resolved: 2023-10-30

## 2023-10-30 LAB
BACTERIA SPEC AEROBE CULT: NO GROWTH
BASOPHILS # BLD AUTO: 0.03 10*3/MM3 (ref 0–0.2)
BASOPHILS NFR BLD AUTO: 0.3 % (ref 0–1.5)
DEPRECATED RDW RBC AUTO: 46.9 FL (ref 37–54)
EOSINOPHIL # BLD AUTO: 0.12 10*3/MM3 (ref 0–0.4)
EOSINOPHIL NFR BLD AUTO: 1.3 % (ref 0.3–6.2)
ERYTHROCYTE [DISTWIDTH] IN BLOOD BY AUTOMATED COUNT: 13.9 % (ref 12.3–15.4)
FERRITIN SERPL-MCNC: 185.5 NG/ML (ref 13–150)
HCT VFR BLD AUTO: 24.7 % (ref 34–46.6)
HGB BLD-MCNC: 8.3 G/DL (ref 12–15.9)
IMM GRANULOCYTES # BLD AUTO: 0.05 10*3/MM3 (ref 0–0.05)
IMM GRANULOCYTES NFR BLD AUTO: 0.5 % (ref 0–0.5)
IRON 24H UR-MRATE: 15 MCG/DL (ref 37–145)
IRON SATN MFR SERPL: 9 % (ref 20–50)
LYMPHOCYTES # BLD AUTO: 1.09 10*3/MM3 (ref 0.7–3.1)
LYMPHOCYTES NFR BLD AUTO: 11.6 % (ref 19.6–45.3)
MCH RBC QN AUTO: 31 PG (ref 26.6–33)
MCHC RBC AUTO-ENTMCNC: 33.6 G/DL (ref 31.5–35.7)
MCV RBC AUTO: 92.2 FL (ref 79–97)
MONOCYTES # BLD AUTO: 0.84 10*3/MM3 (ref 0.1–0.9)
MONOCYTES NFR BLD AUTO: 8.9 % (ref 5–12)
NEUTROPHILS NFR BLD AUTO: 7.27 10*3/MM3 (ref 1.7–7)
NEUTROPHILS NFR BLD AUTO: 77.4 % (ref 42.7–76)
NRBC BLD AUTO-RTO: 0 /100 WBC (ref 0–0.2)
PLATELET # BLD AUTO: 268 10*3/MM3 (ref 140–450)
PMV BLD AUTO: 10.1 FL (ref 6–12)
RBC # BLD AUTO: 2.68 10*6/MM3 (ref 3.77–5.28)
RETICS # AUTO: 0.08 10*6/MM3 (ref 0.02–0.13)
RETICS/RBC NFR AUTO: 2.93 % (ref 0.7–1.9)
TIBC SERPL-MCNC: 168 MCG/DL (ref 298–536)
TRANSFERRIN SERPL-MCNC: 113 MG/DL (ref 200–360)
WBC NRBC COR # BLD: 9.4 10*3/MM3 (ref 3.4–10.8)

## 2023-10-30 PROCEDURE — 82728 ASSAY OF FERRITIN: CPT | Performed by: INTERNAL MEDICINE

## 2023-10-30 PROCEDURE — 83540 ASSAY OF IRON: CPT | Performed by: INTERNAL MEDICINE

## 2023-10-30 PROCEDURE — 25810000003 SODIUM CHLORIDE 0.9 % SOLUTION: Performed by: INTERNAL MEDICINE

## 2023-10-30 PROCEDURE — 84466 ASSAY OF TRANSFERRIN: CPT | Performed by: INTERNAL MEDICINE

## 2023-10-30 PROCEDURE — 25010000002 KETOROLAC TROMETHAMINE PER 15 MG: Performed by: INTERNAL MEDICINE

## 2023-10-30 PROCEDURE — 25010000002 NA FERRIC GLUC CPLX PER 12.5 MG: Performed by: INTERNAL MEDICINE

## 2023-10-30 PROCEDURE — 97165 OT EVAL LOW COMPLEX 30 MIN: CPT

## 2023-10-30 PROCEDURE — 85025 COMPLETE CBC W/AUTO DIFF WBC: CPT | Performed by: INTERNAL MEDICINE

## 2023-10-30 PROCEDURE — 85045 AUTOMATED RETICULOCYTE COUNT: CPT | Performed by: INTERNAL MEDICINE

## 2023-10-30 RX ORDER — FERROUS SULFATE 325(65) MG
325 TABLET ORAL
Qty: 30 TABLET | Refills: 0 | Status: SHIPPED | OUTPATIENT
Start: 2023-10-31 | End: 2023-11-30

## 2023-10-30 RX ORDER — LIDOCAINE HYDROCHLORIDE 10 MG/ML
10 INJECTION, SOLUTION INFILTRATION; PERINEURAL ONCE
Qty: 10 ML | Refills: 0 | Status: DISCONTINUED | OUTPATIENT
Start: 2023-10-30 | End: 2023-10-31 | Stop reason: HOSPADM

## 2023-10-30 RX ORDER — KETOROLAC TROMETHAMINE 15 MG/ML
30 INJECTION, SOLUTION INTRAMUSCULAR; INTRAVENOUS ONCE
Status: COMPLETED | OUTPATIENT
Start: 2023-10-30 | End: 2023-10-30

## 2023-10-30 RX ADMIN — FAMOTIDINE 20 MG: 20 TABLET, FILM COATED ORAL at 09:44

## 2023-10-30 RX ADMIN — ACETAMINOPHEN 650 MG: 325 TABLET ORAL at 09:42

## 2023-10-30 RX ADMIN — ACETAMINOPHEN 1000 MG: 500 TABLET ORAL at 16:34

## 2023-10-30 RX ADMIN — Medication 10 ML: at 09:45

## 2023-10-30 RX ADMIN — VORTIOXETINE 20 MG: 10 TABLET, FILM COATED ORAL at 09:44

## 2023-10-30 RX ADMIN — GABAPENTIN 300 MG: 300 CAPSULE ORAL at 09:44

## 2023-10-30 RX ADMIN — SODIUM CHLORIDE 250 MG: 9 INJECTION, SOLUTION INTRAVENOUS at 11:38

## 2023-10-30 RX ADMIN — KETOROLAC TROMETHAMINE 30 MG: 15 INJECTION, SOLUTION INTRAMUSCULAR; INTRAVENOUS at 13:51

## 2023-10-30 RX ADMIN — ACETAMINOPHEN 1000 MG: 500 TABLET ORAL at 04:50

## 2023-10-30 RX ADMIN — GABAPENTIN 300 MG: 300 CAPSULE ORAL at 16:29

## 2023-10-30 NOTE — PLAN OF CARE
Problem: Adult Inpatient Plan of Care  Goal: Absence of Hospital-Acquired Illness or Injury  Intervention: Identify and Manage Fall Risk  Recent Flowsheet Documentation  Taken 10/30/2023 0341 by Janice Bray LPN  Safety Promotion/Fall Prevention: safety round/check completed  Taken 10/30/2023 0244 by Janice Bray LPN  Safety Promotion/Fall Prevention: safety round/check completed  Taken 10/30/2023 0146 by Janice Bray LPN  Safety Promotion/Fall Prevention: safety round/check completed  Taken 10/30/2023 0000 by Janice Bray LPN  Safety Promotion/Fall Prevention: safety round/check completed  Taken 10/29/2023 2302 by Janice rBay LPN  Safety Promotion/Fall Prevention: safety round/check completed  Taken 10/29/2023 2200 by Janice Bray LPN  Safety Promotion/Fall Prevention: safety round/check completed  Taken 10/29/2023 2134 by Janice Bray LPN  Safety Promotion/Fall Prevention: safety round/check completed  Taken 10/29/2023 1930 by Janice Bray LPN  Safety Promotion/Fall Prevention: safety round/check completed  Intervention: Prevent and Manage VTE (Venous Thromboembolism) Risk  Recent Flowsheet Documentation  Taken 10/29/2023 2134 by Janice Bray LPN  Activity Management: bedrest  VTE Prevention/Management: patient refused intervention  Intervention: Prevent Infection  Recent Flowsheet Documentation  Taken 10/29/2023 2134 by Janice Bray LPN  Infection Prevention:   hand hygiene promoted   rest/sleep promoted  Goal: Optimal Comfort and Wellbeing  Intervention: Monitor Pain and Promote Comfort  Recent Flowsheet Documentation  Taken 10/29/2023 2134 by Janice Bray LPN  Pain Management Interventions:   see MAR   relaxation techniques promoted   quiet environment facilitated  Intervention: Provide Person-Centered Care  Recent Flowsheet Documentation  Taken 10/29/2023 2134 by Janice Bray LPN  Trust Relationship/Rapport:   care explained   choices provided   Goal Outcome  Evaluation:      Pt A&O, no c/o pain this shift, received 2 units of blood ,no c/o blood transfusion reaction, Green and OLGA drain dry and intact, pt started bleeding this morning, applied ABD pad pressure dressing, pt also started to c/o of some dizziness Surgeon made aware. Pt resting in bed, is able to make needs known, call light in reach, will continue current POC

## 2023-10-30 NOTE — NURSING NOTE
Attempted to see patient today. Upon entry of room, Plastic Surgeon at bedside. She will be placing drain to right thigh. She states there is no wound to address at this time. Will discontinue consult at this time.   Stephanie Hartman RN

## 2023-10-30 NOTE — PLAN OF CARE
Problem: Adult Inpatient Plan of Care  Goal: Plan of Care Review  Outcome: Met  Goal: Patient-Specific Goal (Individualized)  Outcome: Met  Goal: Absence of Hospital-Acquired Illness or Injury  Outcome: Met  Intervention: Identify and Manage Fall Risk  Recent Flowsheet Documentation  Taken 10/30/2023 0800 by Perla Perez RN  Safety Promotion/Fall Prevention:   safety round/check completed   assistive device/personal items within reach   clutter free environment maintained   fall prevention program maintained   nonskid shoes/slippers when out of bed  Intervention: Prevent Infection  Recent Flowsheet Documentation  Taken 10/30/2023 0800 by Perla Perez RN  Infection Prevention: hand hygiene promoted  Goal: Optimal Comfort and Wellbeing  Outcome: Met  Intervention: Provide Person-Centered Care  Recent Flowsheet Documentation  Taken 10/30/2023 0800 by Perla Perez RN  Trust Relationship/Rapport:   care explained   choices provided  Goal: Readiness for Transition of Care  Outcome: Met     Problem: Anemia  Goal: Anemia Symptom Improvement  Outcome: Met  Intervention: Monitor and Manage Anemia  Recent Flowsheet Documentation  Taken 10/30/2023 0800 by Perla Perez RN  Safety Promotion/Fall Prevention:   safety round/check completed   assistive device/personal items within reach   clutter free environment maintained   fall prevention program maintained   nonskid shoes/slippers when out of bed   Goal Outcome Evaluation:

## 2023-10-30 NOTE — CONSULTS
"Plastic Surgery  Note    Current Date: 10/30/2023  Name: Angelique Cordon  MRN: [id number]  YOB: 1980  VS: /59 (BP Location: Left arm, Patient Position: Lying)   Pulse 101 Comment: nurse aware  Temp 98 °F (36.7 °C) (Oral)   Resp 16   Ht 152.4 cm (60\")   Wt 62.2 kg (137 lb 2 oz)   LMP 10/17/2023   SpO2 100%   BMI 26.78 kg/m²   ?  ?  History of Present Illness:  Angelique Cordon is a female 43 y.o.  sp 6 days of right lipedema debulking with liposuction and skin excision. She was admitted last night due to weakness and wound bleeding. She received 2 units of blood and feels much better. I was called to evaluate her wound.     Family History:   Family History   Problem Relation Age of Onset    Diabetes Mother     Sleep apnea Mother     COPD Mother     Hypertension Mother     Colon cancer Father     Malig Hyperthermia Neg Hx      Social History:   Social History     Socioeconomic History    Marital status:    Tobacco Use    Smoking status: Never    Smokeless tobacco: Never   Vaping Use    Vaping Use: Never used   Substance and Sexual Activity    Alcohol use: Never    Drug use: Never    Sexual activity: Defer     Partners: Male     Birth control/protection: None     Medical Problems: [unfilled]  Past Medical History:   Past Medical History:   Diagnosis Date    Abnormal bone density screening     Anxiety     Lipedema of lower extremity     LEGS AND ARMS    Pap smear for cervical cancer screening 2018    Varicose vein of leg     Visit for screening mammogram      Past Surgical History:   Past Surgical History:   Procedure Laterality Date    EXCISION LESION Left 4/1/2022    Procedure: EXCISION CYST LEFT AXILLARY;  Surgeon: Rosalie Talley MD;  Location: [facility];  Service: General;  Laterality: Left;    FOOT SURGERY Left 2003/2009 HAD 3 DIFFERENT SURGERIES    L FOOT/TENDON RELEASE PLANTAR FASIA    LIPOSUCTION Right 10/24/2023    Procedure: LIPOSUCTION, Right Lower Extremity " Circumferential Liposuction with Skin Resection and negative pressure dressing;  Surgeon: Raffaele Willams MD;  Location: [facility] OR [facility] [place];  Service: Plastics;  Laterality: Right;     Medications:   Current Facility-Administered Medications:     acetaminophen (TYLENOL) tablet 650 mg, 650 mg, Oral, Q4H PRN **OR** acetaminophen (TYLENOL) 160 MG/5ML oral solution 650 mg, 650 mg, Oral, Q4H PRN **OR** acetaminophen (TYLENOL) suppository 650 mg, 650 mg, Rectal, Q4H PRN, Holland Diaz MD    acetaminophen (TYLENOL) tablet 1,000 mg, 1,000 mg, Oral, Q6H PRN, Holland Diaz MD, 1,000 mg at 10/30/23 0450    aluminum-magnesium hydroxide-simethicone (MAALOX MAX) 400-400-40 MG/5ML suspension 15 mL, 15 mL, Oral, Q6H PRN, Holland Diaz MD    sennosides-docusate (PERICOLACE) 8.6-50 MG per tablet 2 tablet, 2 tablet, Oral, BID, 2 tablet at 10/29/23 2101 **AND** polyethylene glycol (MIRALAX) packet 17 g, 17 g, Oral, Daily PRN **AND** bisacodyl (DULCOLAX) EC tablet 5 mg, 5 mg, Oral, Daily PRN **AND** bisacodyl (DULCOLAX) suppository 10 mg, 10 mg, Rectal, Daily PRN, Holland Diaz MD    calcium carbonate (TUMS) chewable tablet 500 mg (200 mg elemental), 2 tablet, Oral, BID PRN, Holland Diaz MD    Calcium Replacement - Follow Nurse / BPA Driven Protocol, , Does not apply, PRN, Holland Diaz MD    famotidine (PEPCID) tablet 20 mg, 20 mg, Oral, Daily, Holland Diaz MD, 20 mg at 10/29/23 1643    gabapentin (NEURONTIN) capsule 300 mg, 300 mg, Oral, TID, Holland Diaz MD, 300 mg at 10/29/23 2101    HYDROmorphone (DILAUDID) injection 0.5 mg, 0.5 mg, Intravenous, Q4H PRN **AND** naloxone (NARCAN) injection 0.4 mg, 0.4 mg, Intravenous, Q5 Min PRN, Holland Diaz MD    ketorolac (TORADOL) injection 30 mg, 30 mg, Intravenous, Q6H PRN, Holland Diaz MD    lidocaine (XYLOCAINE) 1 % injection 10 mL, 10 mL, Infiltration, Once, Raffaele Willams MD    Magnesium Standard Dose Replacement - Follow Nurse / BPA Driven  "Protocol, , Does not apply, PRN, Holland Diaz MD    melatonin tablet 5 mg, 5 mg, Oral, Nightly, Holland Diaz MD, 5 mg at 10/29/23 2101    ondansetron (ZOFRAN) injection 4 mg, 4 mg, Intravenous, Q6H PRN, Holland Diaz MD    ondansetron (ZOFRAN) tablet 4 mg, 4 mg, Oral, Q6H PRN, Holland Diaz MD    Phosphorus Replacement - Follow Nurse / BPA Driven Protocol, , Does not apply, PRN, Holland Diaz MD    Potassium Replacement - Follow Nurse / BPA Driven Protocol, , Does not apply, Joe BRUCE Raza Ali, MD    [COMPLETED] Insert Peripheral IV, , , Once **AND** sodium chloride 0.9 % flush 10 mL, 10 mL, Intravenous, PRN, Holland Diaz MD    [COMPLETED] Insert Peripheral IV, , , Once **AND** sodium chloride 0.9 % flush 10 mL, 10 mL, Intravenous, PRN, Holland Diaz MD    sodium chloride 0.9 % flush 10 mL, 10 mL, Intravenous, Q12H, Holland Diaz MD, 10 mL at 10/29/23 2101    sodium chloride 0.9 % flush 10 mL, 10 mL, Intravenous, PRN, Holland Diaz MD    sodium chloride 0.9 % infusion 40 mL, 40 mL, Intravenous, PRN, Holland Diaz MD    traMADol (ULTRAM) tablet 50 mg, 50 mg, Oral, Q6H PRN, Holland Diaz MD    Vortioxetine HBr (TRINTELLIX) tablet 20 mg, 20 mg, Oral, Daily, Holland Diaz MD  Allergies: No Known Allergies  ?  Review of Systems: All system were reviewed and were negative, except the ones noted above.   Physical Examination:   Blood pressure 103/59, pulse 101, temperature 98 °F (36.7 °C), temperature source Oral, resp. rate 16, height 152.4 cm (60\"), weight 62.2 kg (137 lb 2 oz), last menstrual period 10/17/2023, SpO2 100%, not currently breastfeeding.  Physical examination demonstrates  General appearance: alert, cooperative, no distress, appears stated age  Right leg with incisions intact except a small partial dehiscence on the posterior thigh.    Assessment and Plan:  Dehiscence of wound, subsequent encounter   42 yo lady sp right leg debulking now with post surgery anemia and " partial wound dehiscence.     Her bleeding is from old hematoma from surgery. It is decompressing from a portion with dehiscence. I performed a bedside procedure in which I placed additional sutures and placed a drain.   Patient and I talked about her pos op weakness and inability to eat, We agree she will stop wegovy for now and we will stop a few weeks before her second surgery since we don't know if the gastric emptying is playing a role on this post op anemia/    Description of procedure.  After explaining the procedure to patient, the area was prepped with chlorhexidine and the affected area anesthetized with lidocaine. Then, the incision was reinforced with interrupted 3-0 nylon and a 15F drain was placed. The drain was fixed with nylon. A dressing was applied. Patient tolerated procedure well.       From my standpoint patient can be discharged home. She already has a follow up with me. She wants her deleon out, I am ok with that.     Raffaele Willams. MD, PhD  Plastic and reconstructive surgery

## 2023-10-30 NOTE — PLAN OF CARE
Goal Outcome Evaluation:  Plan of Care Reviewed With: patient, spouse, family        Progress: no change  Outcome Evaluation: Patient presents with limitations in self-care, functional transfers, balane, and endurance. She would benefit from continued skilled occupational therapy services to maximize independence with ADLs/functional transfers.      Anticipated Discharge Disposition (OT): home with assist

## 2023-10-30 NOTE — DISCHARGE SUMMARY
[facility]        HOSPITALIST  DISCHARGE SUMMARY    Patient Name: Angelique Cordon  : 1980  MRN: [id number]    Date of Admission: 10/29/2023  Date of Discharge:  10/30/2023  Primary Care Physician: Hannah Parrish APRN    Consults       Date and Time Order Name Status Description    10/29/2023  3:25 PM IP General Consult (Use specialty-specific consult if known)      10/29/2023  1:46 PM Hospitalist (on-call MD unless specified)              Active and Resolved Hospital Problems:  Active Hospital Problems    Diagnosis POA   • Bleeding [R58] Yes      Resolved Hospital Problems    Diagnosis POA   • **Acute blood loss anemia [D62] Yes   • Postoperative hypovolemic shock [T81.19XA] Yes     Hypovolemic shock.  Resolved  Acute blood loss anemia.  S/p 2 units packed RBC.  symptomatic anemia.  Improved  Syncope and fatigue due to above.  Improved  Postop bleeding from right leg incision site.  Second OLGA drain placed   Lipedema s/p liposuction and revision excision of the skin on  by plastic surgery .  Anxiety disorder  Iron deficiency anemia.  S/p IV iron x1.    Hospital Course     Hospital Course:  Angelique Cordon is a 43 y.o. female with past medical history of lipedema, varicose veins and anxiety disorder.  Patient had liposuction and revision excision of skin of right lower extremity on  by plastic surgery.  Patient had wound dehiscence postop with bleeding and was seen by plastic surgery with placement of extra stitches on .  Patient while taking shower on  passed out briefly after she felt dizzy.  Her bleeding stopped for a day.  It recurred last night and is dark red blood oozing from her right lateral posterior proximal thigh area.  She was advised to come to ED.  Work-up in the ED showed a blood pressure of 90/39 with heart rate of 126.  Patient got a liter of fluid with blood pressure improving to 111/63 and heart rate improving  117.  Hemoglobin of 7.  Previous hemoglobin of 14 on October 2020.  No baseline hemoglobin available.  Platelet and INR is within normal range  Patient denies bleeding from anywhere else like vomiting blood, black-colored stools or blood in stools or heavy.  No bleeding with a urine.  Patient denies any abdominal pain.  Does take ibuprofen about 4-5 times per week on average.  No history of previous bleeding..  Patient has been feeling weak, tired and fatigued for past few days.  ED physician consulted plastic surger and recommendation is no intervention at this time except transfusing blood and symptomatic support.  Hospice has been called to admit her for further evaluation.  Patient denies any other complaints like chest pain or shortness of breath no fever or chills.    Interval follow-up;  Blood pressure stable less tachycardic remains on room air.  Complaining of a 6/10 headache around forehead tight pressure feeling associated with photosensitivity but no nausea.  Steady headache for past 3 days with no history of migraine.  Tylenol helps  Denies any dizziness, chest pain, shortness of breath tiredness or fatigue.  Patient had another OLGA drain placed by plastic surgery along with additional sutures.  Patient has been cleared by plastic surgery to be released.  Green catheter to be DC'd prior to discharge as per surgery, wagovy has been DC'd as per plastic surgery.        DISCHARGE Follow Up Recommendations for labs and diagnostics: PCP and plastic surgery.  Drain and wound care as per plastic surgery.  Have CBC checked by PCP next few days      Day of Discharge     Vital Signs:  Temp:  [98 °F (36.7 °C)-100.1 °F (37.8 °C)] 98.7 °F (37.1 °C)  Heart Rate:  [] 100  Resp:  [14-16] 16  BP: ()/(50-62) 101/59    Physical Exam:    Constitutional: Awake, alert, no acute distress.  Pale looking              Eyes: Pupils equal, sclerae anicteric, no conjunctival injection              HENT: NCAT, mucous  membranes moist              Neck: Supple, no thyromegaly, no lymphadenopathy, trachea midline              Respiratory: Clear to auscultation bilaterally, nonlabored respirations               Cardiovascular: RRR, no murmurs, rubs, or gallops, palpable pedal pulses bilaterally              Gastrointestinal: Positive bowel sounds, soft, nontender, nondistended              Musculoskeletal: No bilateral ankle edema, no clubbing or cyanosis to extremities              Psychiatric: Appropriate affect, cooperative              Neurologic: Oriented x 3, strength symmetric in all extremities except right lower extremity not tested because of recent surgery, Cranial Nerves grossly intact to confrontation, speech clear              Skin: No rashes.  Postsurgical incisions are healing well with oozing dark blood from 2 spots in the right lateral posterior proximal thigh area.  Stitches intact.  Right lower extremity is dressed.  2 drains in place  Genitourinary has Green catheter    Discharge Details        Discharge Medications        New Medications        Instructions Start Date   ferrous sulfate 325 (65 FE) MG tablet   325 mg, Oral, Daily With Breakfast   Start Date: October 31, 2023            Continue These Medications        Instructions Start Date   acetaminophen 500 MG tablet  Commonly known as: TYLENOL   1,000 mg, Oral, Every 6 Hours PRN      gabapentin 300 MG capsule  Commonly known as: NEURONTIN   300 mg, Oral, 3 Times Daily, POST OP PAIN PER FENSTERER      ibuprofen 800 MG tablet  Commonly known as: ADVIL,MOTRIN   800 mg, Oral, Every 8 Hours PRN      Melatonin 10 MG/ML liquid   5 mg, Oral, Nightly      ondansetron 4 MG tablet  Commonly known as: Zofran   4 mg, Oral, Every 6 Hours PRN      Trintellix 20 MG tablet  Generic drug: Vortioxetine HBr   20 mg, Oral, Daily      Wegovy 0.25 MG/0.5ML solution auto-injector  Generic drug: Semaglutide-Weight Management   0.25 mg, Subcutaneous, Weekly               No Known  Allergies    Discharge Disposition:  Home or Self Care    Diet:  Diet Instructions       Diet: Regular/House Diet; Thin (IDDSI 0)      Discharge Diet: Regular/House Diet    Fluid Consistency: Thin (IDDSI 0)            Discharge Activity:   Activity Instructions       Activity as Tolerated              CODE STATUS:  Code Status and Medical Interventions:   Ordered at: 10/29/23 1407     Code Status (Patient has no pulse and is not breathing):    CPR (Attempt to Resuscitate)     Medical Interventions (Patient has pulse or is breathing):    Full Support         Future Appointments   Date Time Provider Department Center   11/1/2023  2:30 PM Raffaele Willams MD [facility] [place] [facility]   11/6/2023  9:30 AM [facility]N [facility]   3/25/2024 11:45 AM Hannah Parrish APRN [facility]   4/3/2024  2:45 PM Raffaele Willams MD [facility] [facility]   4/12/2024  2:00 PM [address]   4/25/2024  1:30 PM Raffaele Willams MD [facility] [facility]       Additional Instructions for the Follow-ups that You Need to Schedule       Discharge Follow-up with PCP   As directed       Currently Documented PCP:    Hannah Parrish APRN    PCP Phone Number:    [phone]     Follow Up Details: 1 week        Discharge Follow-up with Specialty: Plastic surgery   As directed      Specialty: Plastic surgery                Pertinent  and/or Most Recent Results     PROCEDURES:   * Cannot find OR case *     LAB RESULTS:      Lab 10/30/23  0109 10/29/23  1308   WBC 9.40 9.95   HEMOGLOBIN 8.3* 7.0*   HEMATOCRIT 24.7* 21.5*   PLATELETS 268 348   NEUTROS ABS 7.27* 8.30*   IMMATURE GRANS (ABS) 0.05 0.04   LYMPHS ABS 1.09 0.85   MONOS ABS 0.84 0.69   EOS ABS 0.12 0.05   MCV 92.2 95.1   PROTIME  --  14.2         Lab 10/29/23  1308   SODIUM 138   POTASSIUM 3.7   CHLORIDE 102   CO2 25.8   ANION GAP 10.2   BUN 8   CREATININE 0.64   EGFR 112.6   GLUCOSE 120*   CALCIUM 9.0         Lab 10/29/23  1308   TOTAL PROTEIN  6.6   ALBUMIN 3.4*   GLOBULIN 3.2   ALT (SGPT) 9   AST (SGOT) 10   BILIRUBIN 0.3   ALK PHOS 47         Lab 10/29/23  1308   PROTIME 14.2   INR 1.09             Lab 10/30/23  0109 10/29/23  1322   IRON 15*  --    IRON SATURATION (TSAT) 9*  --    TIBC 168*  --    TRANSFERRIN 113*  --    FERRITIN 185.50*  --    ABO TYPING  --  O   RH TYPING  --  Positive   ANTIBODY SCREEN  --  Negative         Brief Urine Lab Results  (Last result in the past 365 days)        Color   Clarity   Blood   Leuk Est   Nitrite   Protein   CREAT   Urine HCG        10/29/23 1645 Yellow   Clear   Trace   Negative   Negative   Negative                 Microbiology Results (last 10 days)       Procedure Component Value - Date/Time    Urine Culture - Urine, Indwelling Urethral Catheter [[id number]]  (Normal) Collected: 10/29/23 1645    Lab Status: Preliminary result Specimen: Urine from Indwelling Urethral Catheter Updated: 10/30/23 1121     Urine Culture No growth                 Results for orders placed during the hospital encounter of 12/01/22    Venous w Reflux Lower Extremity - Bilateral CAR    Interpretation Summary  •  Normal bilateral lower extremity venous duplex exam.  No evidence for significant superficial or deep venous reflux is demonstrated  •  Left lesser saphenous vein not visualized  •  All other right-sided vessels appear normal.  •  All other left-sided vessels appear normal.      Results for orders placed during the hospital encounter of 12/01/22    Venous w Reflux Lower Extremity - Bilateral CAR    Interpretation Summary  •  Normal bilateral lower extremity venous duplex exam.  No evidence for significant superficial or deep venous reflux is demonstrated  •  Left lesser saphenous vein not visualized  •  All other right-sided vessels appear normal.  •  All other left-sided vessels appear normal.          Imaging Results (All)       None             Labs Pending at Discharge:  Pending Labs       Order Current Status    Urine  Culture - Urine, Indwelling Urethral Catheter Preliminary result                Time spent on Discharge including face to face service: 31 minutes  Part of this note may be an electronic transcription/translation of spoken language to printed text using the Dragon Dictation System.     TElectronically signed by Holland Diaz MD, 10/30/23, 2:34 PM EDT.

## 2023-10-30 NOTE — THERAPY EVALUATION
Patient Name: Angelique Cordon  : 1980    MRN: [id number]                              Today's Date: 10/30/2023       Admit Date: 10/29/2023    Visit Dx:     ICD-10-CM ICD-9-CM   1. Postoperative anemia due to acute blood loss  D62 285.1   2. Hemorrhagic shock  R57.8 785.59   3. Syncope and collapse  R55 780.2   4. Decreased activities of daily living (ADL)  Z78.9 V49.89     Patient Active Problem List   Diagnosis    Annual physical exam    Depression    Sebaceous cyst    Varicose veins of both lower extremities with pain    Lipedema    Localized edema    Pain in both lower extremities    Difficulty walking    Anxiety    Overweight (BMI 25.0-29.9)    Acute blood loss anemia    Bleeding    Postoperative hypovolemic shock     Past Medical History:   Diagnosis Date    Abnormal bone density screening     Anxiety     Lipedema of lower extremity     LEGS AND ARMS    Pap smear for cervical cancer screening 2018    Varicose vein of leg     Visit for screening mammogram      Past Surgical History:   Procedure Laterality Date    EXCISION LESION Left 2022    Procedure: EXCISION CYST LEFT AXILLARY;  Surgeon: Rosalie Talley MD;  Location: [facility];  Service: General;  Laterality: Left;    FOOT SURGERY Left  HAD 3 DIFFERENT SURGERIES    L FOOT/TENDON RELEASE PLANTAR FASIA    LIPOSUCTION Right 10/24/2023    Procedure: LIPOSUCTION, Right Lower Extremity Circumferential Liposuction with Skin Resection and negative pressure dressing;  Surgeon: Raffaele Willams MD;  Location: [facility];  Service: Plastics;  Laterality: Right;      General Information       Row Name 10/30/23 1112          OT Time and Intention    Document Type evaluation  -LF     Mode of Treatment individual therapy;occupational therapy  -LF       Row Name 10/30/23 1112          General Information    Patient Profile Reviewed yes  -LF     Prior Level of Function --  (I) with ADLs, ambulated w/o a device, has a walk-in shower  where she stands to shower with shower chair and grab bar in place, elevated commode, stands to groom, drives, no home O2, works on post at [employer], and follows up with OT at [facility] lymphedema clinic.  -     Existing Precautions/Restrictions other (see comments)  OLGA drain x1 RLE  -     Barriers to Rehab none identified  -       Row Name 10/30/23 1112          Occupational Profile    Reason for Services/Referral (Occupational Profile) Patient is a 43 year old female admitted to [facility] on October 29th. 2023 d/t weakness, fatigue, and post-op bleeding from right leg incision off and on since October 24, 2023. Occupational therapy consulted due to recent decline in ADLs/functional transfers. No previous occupational therapy services for current condition.  -       Row Name 10/30/23 1112          Living Environment    People in Home spouse  -       Row Name 10/30/23 1112          Home Main Entrance    Number of Stairs, Main Entrance ten  -     Stair Railings, Main Entrance railings safe and in good condition  -       Row Name 10/30/23 1112          Cognition    Orientation Status (Cognition) oriented x 4  -       Row Name 10/30/23 1112          Safety Issues, Functional Mobility    Impairments Affecting Function (Mobility) balance;pain;endurance/activity tolerance  -               User Key  (r) = Recorded By, (t) = Taken By, (c) = Cosigned By      Initials Name Provider Type     Susan Matthews OT Occupational Therapist                     Mobility/ADL's       Row Name 10/30/23 1117          Bed Mobility    Comment, (Bed Mobility) SBA for longsitting in bed, further functional transfers deferred d/t RLE active bleeding, nursing notified.  -       Row Name 10/30/23 1117          Activities of Daily Living    BADL Assessment/Intervention bathing;upper body dressing;lower body dressing;grooming;feeding;toileting  -       Row Name 10/30/23 1117          Bathing Assessment/Intervention     [place] Level (Bathing) bathing skills;upper body;set up;lower body;minimum assist (75% patient effort)  -       Row Name 10/30/23 1117          Upper Body Dressing Assessment/Training    [place] Level (Upper Body Dressing) upper body dressing skills;set up  -       Row Name 10/30/23 1117          Lower Body Dressing Assessment/Training    [place] Level (Lower Body Dressing) lower body dressing skills;minimum assist (75% patient effort)  -[facility] Name 10/30/23 1117          Grooming Assessment/Training    [place] Level (Grooming) grooming skills;set up  -       Row Name 10/30/23 1117          Self-Feeding Assessment/Training    [place] Level (Feeding) feeding skills;set up  -[facility] Name 10/30/23 1117          Toileting Assessment/Training    [place] Level (Toileting) toileting skills;standby assist  -               User Key  (r) = Recorded By, (t) = Taken By, (c) = Cosigned By      Initials Name Provider Type     Susan Matthews OT Occupational Therapist                   Obj/Interventions       [facility] Name 10/30/23 1118          Sensory Assessment (Somatosensory)    Sensory Assessment (Somatosensory) UE sensation intact  -LF       Row Name 10/30/23 1118          Vision Assessment/Intervention    Visual Impairment/Limitations WFL  -LF       Row Name 10/30/23 1118          Range of Motion Comprehensive    General Range of Motion bilateral upper extremity ROM WFL  -LF       Row Name 10/30/23 1118          Strength Comprehensive (MMT)    Comment, General Manual Muscle Testing (MMT) Assessment 5/5 BUEs  -LF       Row Name 10/30/23 1118          Motor Skills    Motor Skills coordination;functional endurance  -LF     Coordination bilateral;upper extremity;WFL  -LF     Functional Endurance Fair+  -[facility] Name 10/30/23 1118          Balance    Balance Assessment sitting static balance  -LF     Static Sitting Balance supervision  -LF     Position, Sitting Balance  unsupported;long sitting  -LF               User Key  (r) = Recorded By, (t) = Taken By, (c) = Cosigned By      Initials Name Provider Type    LF Susan Matthews OT Occupational Therapist                   Goals/Plan       Row Name 10/30/23 1119          Bed Mobility Goal 1 (OT)    Activity/Assistive Device (Bed Mobility Goal 1, OT) bed mobility activities, all  -LF     [place] Level/Cues Needed (Bed Mobility Goal 1, OT) independent  -LF     Time Frame (Bed Mobility Goal 1, OT) long term goal (LTG);10 days  -LF       Row Name 10/30/23 1119          Transfer Goal 1 (OT)    Activity/Assistive Device (Transfer Goal 1, OT) transfers, all  -LF     [place] Level/Cues Needed (Transfer Goal 1, OT) independent  -LF     Time Frame (Transfer Goal 1, OT) long term goal (LTG);10 days  -LF       Row Name 10/30/23 1119          Bathing Goal 1 (OT)    Activity/Device (Bathing Goal 1, OT) bathing skills, all  -LF     [place] Level/Cues Needed (Bathing Goal 1, OT) independent  -LF     Time Frame (Bathing Goal 1, OT) long term goal (LTG);10 days  -LF       Row Name 10/30/23 1119          Dressing Goal 1 (OT)    Activity/Device (Dressing Goal 1, OT) dressing skills, all  -LF     [place]/Cues Needed (Dressing Goal 1, OT) independent  -LF     Time Frame (Dressing Goal 1, OT) long term goal (LTG);10 days  -LF       Row Name 10/30/23 1119          Toileting Goal 1 (OT)    Activity/Device (Toileting Goal 1, OT) toileting skills, all  -LF     [place] Level/Cues Needed (Toileting Goal 1, OT) independent  -LF     Time Frame (Toileting Goal 1, OT) long term goal (LTG);10 days  -LF       Row Name 10/30/23 1119          Problem Specific Goal 1 (OT)    Problem Specific Goal 1 (OT) Patient will demonstrate good endurance to support ADLs/functional transfers.  -LF     Time Frame (Problem Specific Goal 1, OT) long term goal (LTG);10 days  -LF       Row Name 10/30/23 1119          Therapy Assessment/Plan (OT)    Planned  Therapy Interventions (OT) activity tolerance training;BADL retraining;functional balance retraining;occupation/activity based interventions;patient/caregiver education/training;strengthening exercise;transfer/mobility retraining  -               User Key  (r) = Recorded By, (t) = Taken By, (c) = Cosigned By      Initials Name Provider Type     Susan Matthews OT Occupational Therapist                   Clinical Impression       Row Name 10/30/23 1119          Pain Assessment    Additional Documentation Pain Scale: FACES Pre/Post-Treatment (Group)  -       Row Name 10/30/23 1119          Pain Scale: FACES Pre/Post-Treatment    Pain: FACES Scale, Pretreatment 2-->hurts little bit  -LF     Posttreatment Pain Rating 2-->hurts little bit  -LF     Pain Location - Side/Orientation Right  -     Pain Location lower  -     Pain Location - extremity  -       Row Name 10/30/23 1119          Plan of Care Review    Plan of Care Reviewed With patient;spouse;family  -     Progress no change  -     Outcome Evaluation Patient presents with limitations in self-care, functional transfers, balane, and endurance. She would benefit from continued skilled occupational therapy services to maximize independence with ADLs/functional transfers.  -       Row Name 10/30/23 1119          Therapy Assessment/Plan (OT)    Patient/Family Therapy Goal Statement (OT) To maximize independence.  -     Rehab Potential (OT) good, to achieve stated therapy goals  -     Criteria for Skilled Therapeutic Interventions Met (OT) yes;meets criteria;skilled treatment is necessary  -     Therapy Frequency (OT) 5 times/wk  -       Row Name 10/30/23 1119          Therapy Plan Review/Discharge Plan (OT)    Equipment Needs Upon Discharge (OT) commode chair  -     Anticipated Discharge Disposition (OT) home with assist  -       Row Name 10/30/23 1119          Vital Signs    O2 Delivery Pre Treatment room air  -     O2 Delivery Intra  Treatment room air  -LF     O2 Delivery Post Treatment room air  -LF               User Key  (r) = Recorded By, (t) = Taken By, (c) = Cosigned By      Initials Name Provider Type    LF Susan Matthews OT Occupational Therapist                   Outcome Measures       Row Name 10/30/23 1120          How much help from another is currently needed...    Putting on and taking off regular lower body clothing? 3  -LF     Bathing (including washing, rinsing, and drying) 3  -LF     Toileting (which includes using toilet bed pan or urinal) 3  -LF     Putting on and taking off regular upper body clothing 4  -LF     Taking care of personal grooming (such as brushing teeth) 4  -LF     Eating meals 4  -LF     AM-PAC 6 Clicks Score (OT) 21  -LF       Row Name 10/30/23 0800 10/30/23 0100       How much help from another person do you currently need...    Turning from your back to your side while in flat bed without using bedrails? 4  -KB 4  -CD    Moving from lying on back to sitting on the side of a flat bed without bedrails? 3  -KB 3  -CD    Moving to and from a bed to a chair (including a wheelchair)? 3  -KB 3  -CD    Standing up from a chair using your arms (e.g., wheelchair, bedside chair)? 3  -KB 3  -CD    Climbing 3-5 steps with a railing? 2  -KB 2  -CD    To walk in hospital room? 2  -KB 2  -CD    AM-PAC 6 Clicks Score (PT) 17  -KB 17  -CD    Highest level of mobility 5 --> Static standing  -KB 5 --> Static standing  -CD      Row Name 10/30/23 1120          Functional Assessment    Outcome Measure Options AM-PAC 6 Clicks Daily Activity (OT);Optimal Instrument  -LF       Row Name 10/30/23 1120          Optimal Instrument    Optimal Instrument Optimal - 3  -LF     Bending/Stooping 2  -LF     Standing 2  -LF     Reaching 1  -LF     From the list, choose the 3 activities you would most like to be able to do without any difficulty Bending/stooping;Standing;Reaching  -LF     Total Score Optimal - 3 5  -LF               User  Key  (r) = Recorded By, (t) = Taken By, (c) = Cosigned By      Initials Name Provider Type    Janice Oconnell LPN Licensed Nurse    Susan Merino OT Occupational Therapist    Perla Fry, RN Registered Nurse                    Occupational Therapy Education       Title: PT OT SLP Therapies (Done)       Topic: Occupational Therapy (Done)       Point: ADL training (Done)       Description:   Instruct learner(s) on proper safety adaptation and remediation techniques during self care or transfers.   Instruct in proper use of assistive devices.                  Learning Progress Summary             Patient Acceptance, E,TB, VU by  at 10/30/2023 1120   Family Acceptance, E,TB, VU by  at 10/30/2023 1120   Significant Other Acceptance, E,TB, VU by  at 10/30/2023 1120                         Point: Precautions (Done)       Description:   Instruct learner(s) on prescribed precautions during self-care and functional transfers.                  Learning Progress Summary             Patient Acceptance, E,TB, VU by  at 10/30/2023 1120   Family Acceptance, E,TB, VU by  at 10/30/2023 1120   Significant Other Acceptance, E,TB, VU by  at 10/30/2023 1120                         Point: Body mechanics (Done)       Description:   Instruct learner(s) on proper positioning and spine alignment during self-care, functional mobility activities and/or exercises.                  Learning Progress Summary             Patient Acceptance, E,TB, VU by  at 10/30/2023 1120   Family Acceptance, E,TB, VU by  at 10/30/2023 1120   Significant Other Acceptance, E,TB, VU by  at 10/30/2023 1120                                         User Key       Initials Effective Dates Name Provider Type Discipline     06/16/21 -  Susan Matthews OT Occupational Therapist OT                  OT Recommendation and Plan  Planned Therapy Interventions (OT): activity tolerance training, BADL retraining, functional balance retraining,  occupation/activity based interventions, patient/caregiver education/training, strengthening exercise, transfer/mobility retraining  Therapy Frequency (OT): 5 times/wk  Plan of Care Review  Plan of Care Reviewed With: patient, spouse, family  Progress: no change  Outcome Evaluation: Patient presents with limitations in self-care, functional transfers, balane, and endurance. She would benefit from continued skilled occupational therapy services to maximize independence with ADLs/functional transfers.     Time Calculation:   Evaluation Complexity (OT)  Review Occupational Profile/Medical/Therapy History Complexity: brief/low complexity  Assessment, Occupational Performance/Identification of Deficit Complexity: 1-3 performance deficits  Clinical Decision Making Complexity (OT): problem focused assessment/low complexity  Overall Complexity of Evaluation (OT): low complexity     Time Calculation- OT       Row Name 10/30/23 1121             Time Calculation- OT    OT Received On 10/30/23  -LF      OT Goal Re-Cert Due Date 11/08/23  -LF         Untimed Charges    OT Eval/Re-eval Minutes 33  -LF         Total Minutes    Untimed Charges Total Minutes 33  -LF       Total Minutes 33  -LF                User Key  (r) = Recorded By, (t) = Taken By, (c) = Cosigned By      Initials Name Provider Type    LF Susan Matthews OT Occupational Therapist                  Therapy Charges for Today       Code Description Service Date Service Provider Modifiers Qty    [id number] HC OT EVAL LOW COMPLEXITY 3 10/30/2023 Susan Matthews OT GO 1                 Susan Matthews OT  10/30/2023

## 2023-10-31 ENCOUNTER — TRANSITIONAL CARE MANAGEMENT TELEPHONE ENCOUNTER (OUTPATIENT)
Dept: CALL CENTER | Facility: HOSPITAL | Age: 43
End: 2023-10-31
Payer: OTHER GOVERNMENT

## 2023-10-31 LAB
BH BB BLOOD EXPIRATION DATE: NORMAL
BH BB BLOOD TYPE BARCODE: 5100
BH BB DISPENSE STATUS: NORMAL
BH BB PRODUCT CODE: NORMAL
BH BB UNIT NUMBER: NORMAL
CROSSMATCH INTERPRETATION: NORMAL
UNIT  ABO: NORMAL
UNIT  RH: NORMAL

## 2023-10-31 NOTE — NURSING NOTE
Pt d/c home with spouse as transport. IV taken out prior to d/c. All d/c instructions reviewed with pt on day shift and pt stated understanding. Pt advised to call unit if arrive home and have any questions or concerns-pt stated understanding.

## 2023-10-31 NOTE — OUTREACH NOTE
Call Center TCM Note      Flowsheet Row Responses   [facility] patient discharged from? [facility]   Does the patient have one of the following disease processes/diagnoses(primary or secondary)? Other   TCM attempt successful? No  [None]   Unsuccessful attempts Attempt 1            Wendy Lowery RN    10/31/2023, 10:10 EDT

## 2023-10-31 NOTE — OUTREACH NOTE
Prep Survey      Flowsheet Row Responses   [facility] patient discharged from? [facility]   Is LACE score < 7 ? Yes   Eligibility [facility]   Date of Admission 10/29/23   Date of Discharge 10/30/23   Discharge Disposition Home-Health Care Sv   Discharge diagnosis Acute blood loss anemia   Does the patient have one of the following disease processes/diagnoses(primary or secondary)? Other   Does the patient have Home health ordered? Yes   What is the Home health agency?  [facility]   Prep survey completed? Yes            Senait DIAZ - Registered Nurse

## 2023-10-31 NOTE — OUTREACH NOTE
Call Center TCM Note      Flowsheet Row Responses   [facility] facility patient discharged from? [facility]   Does the patient have one of the following disease processes/diagnoses(primary or secondary)? Other   TCM attempt successful? No   Unsuccessful attempts Attempt 2            Wendy Lowery RN    10/31/2023, 10:26 EDT

## 2023-11-01 ENCOUNTER — TELEPHONE (OUTPATIENT)
Dept: FAMILY MEDICINE CLINIC | Facility: CLINIC | Age: 43
End: 2023-11-01

## 2023-11-01 ENCOUNTER — TELEMEDICINE (OUTPATIENT)
Dept: PLASTIC SURGERY | Facility: CLINIC | Age: 43
End: 2023-11-01
Payer: OTHER GOVERNMENT

## 2023-11-01 ENCOUNTER — TRANSITIONAL CARE MANAGEMENT TELEPHONE ENCOUNTER (OUTPATIENT)
Dept: CALL CENTER | Facility: HOSPITAL | Age: 43
End: 2023-11-01
Payer: OTHER GOVERNMENT

## 2023-11-01 DIAGNOSIS — T81.31XA DEHISCENCE OF OPERATIVE WOUND, INITIAL ENCOUNTER: Primary | ICD-10-CM

## 2023-11-01 PROCEDURE — 99024 POSTOP FOLLOW-UP VISIT: CPT | Performed by: SURGERY

## 2023-11-01 NOTE — TELEPHONE ENCOUNTER
Lashawn from [facility] is requesting verbal orders for patient to start   OT.     Lashawn:   [phone]

## 2023-11-01 NOTE — OUTREACH NOTE
Call Center TCM Note      Flowsheet Row Responses   [facility] patient discharged from? [facility]   Does the patient have one of the following disease processes/diagnoses(primary or secondary)? Other   TCM attempt successful? Yes   Call start time 0925   Call end time 0931   Discharge diagnosis Acute blood loss anemia   Meds reviewed with patient/caregiver? Yes   Is the patient having any side effects they believe may be caused by any medication additions or changes? No   Does the patient have all medications ordered at discharge? Yes   Is the patient taking all medications as directed (includes completed medication regime)? Yes   Comments HOSP DC FU appt 11/6/23 930 am.   Does the patient have an appointment with their PCP within 7-14 days of discharge? Yes   Has home health visited the patient within 72 hours of discharge? Unsure   Psychosocial issues? No   Did the patient receive a copy of their discharge instructions? Yes   Nursing interventions Reviewed instructions with patient   What is the patient's perception of their health status since discharge? Improving   Is the patient/caregiver able to teach back signs and symptoms related to disease process for when to call PCP? Yes   Is the patient/caregiver able to teach back signs and symptoms related to disease process for when to call 911? Yes   Is the patient/caregiver able to teach back the hierarchy of who to call/visit for symptoms/problems? PCP, Specialist, Home health nurse, Urgent Care, ED, 911 Yes   TCM call completed? Yes   Wrap up additional comments Pt reports she is doing much better. Pt does report blood and not sure if in urine or menstral cycle. PCP is aware. Denies pain in abd or with urinating.   Call end time 0931            Radha Hollins RN    11/1/2023, 09:31 EDT

## 2023-11-01 NOTE — TELEPHONE ENCOUNTER
Pt was admitted post surgery. Home health orders should come from the surgeons office. Spoke with Lashawn and she said she will reach out to Dr. Willams

## 2023-11-02 ENCOUNTER — TELEPHONE (OUTPATIENT)
Dept: PLASTIC SURGERY | Facility: CLINIC | Age: 43
End: 2023-11-02
Payer: OTHER GOVERNMENT

## 2023-11-02 NOTE — TELEPHONE ENCOUNTER
Rcvd call from Lashawn with [facility]  inquiring on if  was approved for pt. Lashawn will send over orders to be signed.

## 2023-11-03 ENCOUNTER — OFFICE VISIT (OUTPATIENT)
Dept: PLASTIC SURGERY | Facility: CLINIC | Age: 43
End: 2023-11-03
Payer: OTHER GOVERNMENT

## 2023-11-03 VITALS — TEMPERATURE: 98 F

## 2023-11-03 DIAGNOSIS — Z09 POSTOPERATIVE FOLLOW-UP: Primary | ICD-10-CM

## 2023-11-03 PROCEDURE — 99024 POSTOP FOLLOW-UP VISIT: CPT | Performed by: SURGERY

## 2023-11-03 NOTE — PROGRESS NOTES
Chief Complaint  Post-op Follow-up (Open area on right leg)    Subjective              History of Present Illness  Angelique Cordon is a 43 y.o. female who presents to [facility] for in office procedure for closure of right thigh wound.      Allergies: Patient has no known allergies.  Allergies Reconciled.    Review of Systems   All review of system has been reviewed and it  is negative except the ones note above.     Objective     Temp 98 °F (36.7 °C) (Temporal)     There is no height or weight on file to calculate BMI.    Physical Exam   Cardiovascular: Normal rate.     Pulmonary/Chest  Effort normal.       Cardiovascular: Normal rate    Pulmonary/Chest: Effort normal    Face:     Right Leg: bilateral drains intact, bright red blood intact on abd pad, approximately 1cm by 20cm incision opened, wound bed is beefy red, no signs of infection, bruising and edema improving.       Result Review :         Excision Procedure: Consent obtained. Local injected to site, Lidocaine 1% with epi.  Site prepped with ChloraPrep  in sterile fashion. Site draped in sterile fashion. Established hemostasis with direct pressure. Site was thoroughly irrigated. Site closed with 3-0 Vicryl and 3-0 nylon in a subcutaneous fashion to obliterate dead space, then with 3-0 Vicryl and 3-0 nylon with staples in two layers on the dermis . Site cleaned with sterile normal saline. The patient tolerated the procedure well with no immediate complications.          Assessment and Plan      Diagnoses and all orders for this visit:    1. Postoperative follow-up (Primary)          Plan:  Removed lower right leg drain. Covered right back leg incision with abd pads, kerlix and ace wrap. Drain was in good position. Keep logging drain. Continue to limit use of right leg. Patient is aware to call the office with any other concerns. Plan to leave staples and sutures in place for 3 weeks. RTC in 2 weeks for  follow up.    Scribed by Sindi Narvaez MA, acting as a scribe for Raffaele Willams MD, 11/03/23 17:25 EDT.  Raffaele Willams MD's signature on the note affirms that the note adequately documents the care provided.              Patient was given instructions and counseling regarding her condition. Please see specific information pulled into the AVS if appropriate.     Sindi Narvaez MA  11/03/2023

## 2023-11-06 NOTE — PROGRESS NOTES
"Chief Complaint  Post-op Follow-up (Open area to right leg)    Subjective      The staples burn really bad        History of Present Illness  Angelique Cordon is a 43 y.o. female who presents to [facility] for in office procedure for closure of right thigh wound. LIPOSUCTION, Right Lower Extremity Circumferential Liposuction with Skin Resection and negative pressure dressing.    Patient comes in today for drain removal and open area on right thigh.   Pt states she is having some burning from staples.    She has 1 drain intact with less 25cc for the last couple of days. Fluid is cloudy, bulb is expanded, no suction noted    Pt still has the opening around drain site.      Allergies: Patient has no known allergies.  Allergies Reconciled.    Review of Systems   All review of system has been reviewed and it  is negative except the ones note above.     Objective   Vital signs stable this visit    /68 (BP Location: Right arm, Patient Position: Lying, Cuff Size: Adult)   Pulse 99   Temp 98 °F (36.7 °C) (Temporal)   Ht 152.4 cm (60\")   Wt 60.3 kg (133 lb)   SpO2 98%   BMI 25.97 kg/m²     Body mass index is 25.97 kg/m².    Physical Exam   Cardiovascular: Normal rate.     Pulmonary/Chest  Effort normal.       Cardiovascular: Normal rate    Pulmonary/Chest: Effort normal    Skin: warm and dry, afebrile    Right Leg: posterior right thigh, wound has cont  dehiscence, staples and sutures are pulled apart and attached to proximal wound edge. Pictures obtained. The wound bed is pink  right thigh drain in place but no suction as skin has pulled apart around the drain with brown drainage in bulb. Wound measurements 3'o clock tunneling measuring 8.3cm, and 9'o clock tunneling measuring 5.5cm, complete wound measuring 17.5cm by 3cm by 1.5cm, no signs of infection, juanito wound edges are ragged and trimmed much as patient was able to tolerate due to pain.  Wound was cleansed " with NS and gauze, wound culture obtained.    Result Review :     No new results to review this visit    Assessment and Plan      Diagnoses and all orders for this visit:    1. Pain (Primary)  -     HYDROcodone-acetaminophen (Norco) 5-325 MG per tablet; Take 1 tablet by mouth Every 6 (Six) Hours As Needed for Severe Pain for up to 30 doses.  Dispense: 30 tablet; Refill: 0    2. Dehiscence of operative wound, initial encounter  -     OT Consult: Eval & Treat  -     Wound Culture - Wound, Thigh, Right; Future  -     Wound Culture - Wound, Thigh, Right  -     HYDROcodone-acetaminophen (Norco) 5-325 MG per tablet; Take 1 tablet by mouth Every 6 (Six) Hours As Needed for Severe Pain for up to 30 doses.  Dispense: 30 tablet; Refill: 0        Plan:  Removed right thigh drain. Photos obtained on handheld. Drain site area has opened up completely. Removed staples and spitting suture that was not intact. Discussed to patient about placing a wound vac to improve healing faster, and will be able to be more mobile. In the meantime patient and patient's  will do NS damp to dry dressings. Gave patient some supplies for her to do daily dressing changes. Patient will call the office once wound vac supplies come in. Obtained wound culture, We will call patient with results and if results come back abnormal then we will put her on antibiotics. We will call in pain medication for her to take 30-45 minutes before dressing changes due to procedural discomfort. She may shower before dressing is changed and then change the dressing. Caution if takes pain medication before shower for dressing change, has fallen in the shower before.  RTC 1 week for wound check.  Patient and  instructed to call office for any questions or concerns and verbalized understanding of all instructions given.     Scribed by Sindi Narvaez MA, acting as a scribe for ANDREI Dave, 11/13/23 10:46 EST.  ANDREI Dave's signature on  the note affirms that the note adequately documents the care provided.        Patient was given instructions and counseling regarding her condition. Please see specific information pulled into the AVS if appropriate.     Sindi Narvaez MA  11/13/2023

## 2023-11-07 ENCOUNTER — OFFICE VISIT (OUTPATIENT)
Dept: PLASTIC SURGERY | Facility: CLINIC | Age: 43
End: 2023-11-07
Payer: OTHER GOVERNMENT

## 2023-11-07 ENCOUNTER — TELEPHONE (OUTPATIENT)
Dept: FAMILY MEDICINE CLINIC | Facility: CLINIC | Age: 43
End: 2023-11-07
Payer: OTHER GOVERNMENT

## 2023-11-07 VITALS
HEIGHT: 60 IN | OXYGEN SATURATION: 98 % | SYSTOLIC BLOOD PRESSURE: 102 MMHG | BODY MASS INDEX: 26.78 KG/M2 | TEMPERATURE: 98.7 F | DIASTOLIC BLOOD PRESSURE: 67 MMHG | HEART RATE: 97 BPM

## 2023-11-07 DIAGNOSIS — Z09 POSTOPERATIVE FOLLOW-UP: Primary | ICD-10-CM

## 2023-11-07 DIAGNOSIS — T81.31XS DEHISCENCE OF OPERATIVE WOUND, SEQUELA: ICD-10-CM

## 2023-11-07 PROCEDURE — 99024 POSTOP FOLLOW-UP VISIT: CPT | Performed by: NURSE PRACTITIONER

## 2023-11-07 NOTE — PROGRESS NOTES
"Chief Complaint  Post-op Follow-up (wound)    Subjective        \"I don't know what I'm doing wrong\"      History of Present Illness  Angelique Cordon is a 43 y.o. female who presents to [facility] for open area of right thigh around drain site. Patient states her drain is not suctioning either. Has been putting out at least 25CC's a day      Allergies: Patient has no known allergies.  Allergies Reconciled.    Review of Systems   All review of system has been reviewed and it  is negative except the ones note above.     Objective     Vital signs stable this visit    /67 (BP Location: Left arm, Patient Position: Sitting, Cuff Size: Adult)   Pulse 97   Temp 98.7 °F (37.1 °C) (Temporal)   Ht 152.4 cm (60\")   SpO2 98%   BMI 26.78 kg/m²     Body mass index is 26.78 kg/m².    Physical Exam   Cardiovascular: Normal rate.     Pulmonary/Chest  Effort normal.       Cardiovascular: Normal rate    Pulmonary/Chest: Effort normal      Right Leg: drain intact, sanguinous drainage noted, suction is positional, there are 3 staple that have pulled apart around drain tubing, the remaining staples and sutures are intact, minimal redness at staple line noted, small open area approx 2cm x 2.5cm x 1cm, with mild yellow bruising on right upper and lower leg. Remainder of thigh incision is healing well. Right inner thigh and calf post op yellow bruising noted, drain insertion site healing, draining scant amount of yellow drainage.     Result Review :     Procedure:  Edges of wound are irregular and difficult to approximate.  1% lido with epi, 8ml.  Approximated the edges as close as possible around the drain with 4.0 nylon, unable to get edges close enough to staple.  Reinforced area with Mastisol and steri strips. Good suction from drain noted, but is positional. ABD applied to cover incision.      Assessment and Plan     Diagnosis Plan   1. Postoperative follow-up        2. " Dehiscence of operative wound, sequela             Diagnoses and all orders for this visit:    1. Postoperative follow-up (Primary)    2. Dehiscence of operative wound, sequela            Plan:  Cont limited activity and keeping right leg straight  Encouraged protein for wound healing  Cont to monitor drainage and bulb suction  Keep sched appoint 11/13/2023    patient instructed to call office for any questions or concerns and verbalized understanding of all instructions given.           Patient was given instructions and counseling regarding her condition. Please see specific information pulled into the AVS if appropriate.     Wendy Salter, APRN  11/07/2023

## 2023-11-13 ENCOUNTER — OFFICE VISIT (OUTPATIENT)
Dept: PLASTIC SURGERY | Facility: CLINIC | Age: 43
End: 2023-11-13
Payer: OTHER GOVERNMENT

## 2023-11-13 VITALS
OXYGEN SATURATION: 98 % | DIASTOLIC BLOOD PRESSURE: 68 MMHG | SYSTOLIC BLOOD PRESSURE: 104 MMHG | WEIGHT: 133 LBS | HEIGHT: 60 IN | HEART RATE: 99 BPM | BODY MASS INDEX: 26.11 KG/M2 | TEMPERATURE: 98 F

## 2023-11-13 DIAGNOSIS — Z09 POSTOPERATIVE FOLLOW-UP: ICD-10-CM

## 2023-11-13 DIAGNOSIS — R52 PAIN: Primary | ICD-10-CM

## 2023-11-13 DIAGNOSIS — T81.31XA DEHISCENCE OF OPERATIVE WOUND, INITIAL ENCOUNTER: ICD-10-CM

## 2023-11-13 PROCEDURE — 87186 SC STD MICRODIL/AGAR DIL: CPT | Performed by: NURSE PRACTITIONER

## 2023-11-13 PROCEDURE — 87205 SMEAR GRAM STAIN: CPT | Performed by: NURSE PRACTITIONER

## 2023-11-13 PROCEDURE — 87077 CULTURE AEROBIC IDENTIFY: CPT | Performed by: NURSE PRACTITIONER

## 2023-11-13 PROCEDURE — 99024 POSTOP FOLLOW-UP VISIT: CPT | Performed by: NURSE PRACTITIONER

## 2023-11-13 PROCEDURE — 87070 CULTURE OTHR SPECIMN AEROBIC: CPT | Performed by: NURSE PRACTITIONER

## 2023-11-13 RX ORDER — HYDROCODONE BITARTRATE AND ACETAMINOPHEN 5; 325 MG/1; MG/1
1 TABLET ORAL EVERY 6 HOURS PRN
Qty: 30 TABLET | Refills: 0 | Status: SHIPPED | OUTPATIENT
Start: 2023-11-13

## 2023-11-14 ENCOUNTER — TREATMENT (OUTPATIENT)
Dept: PLASTIC SURGERY | Facility: CLINIC | Age: 43
End: 2023-11-14
Payer: OTHER GOVERNMENT

## 2023-11-14 DIAGNOSIS — B96.89 SKIN INFECTION, BACTERIAL: Primary | ICD-10-CM

## 2023-11-14 DIAGNOSIS — L08.9 SKIN INFECTION, BACTERIAL: Primary | ICD-10-CM

## 2023-11-14 RX ORDER — CEFDINIR 300 MG/1
300 CAPSULE ORAL 2 TIMES DAILY
Qty: 28 CAPSULE | Refills: 0 | Status: SHIPPED | OUTPATIENT
Start: 2023-11-14 | End: 2023-11-20 | Stop reason: SDUPTHER

## 2023-11-14 NOTE — PROGRESS NOTES
"Chief Complaint  Post-op Follow-up (Wound vac)    Subjective      Im ready to be on the road to healing    History of Present Illness  Angelique Cordon is a 43 y.o. female who presents to [facility] for in office procedure for closure of right thigh wound. LIPOSUCTION, Right Lower Extremity Circumferential Liposuction with Skin Resection and negative pressure dressing.    Patient comes in today for wound check and dressing change. She feels like it has opened up even more than at last visit. Still taking antibiotic.    Allergies: Patient has no known allergies.  Allergies Reconciled.    Review of Systems   All review of system has been reviewed and it  is negative except the ones note above.     Objective   Vital signs stable this visit    /71 (BP Location: Left arm, Patient Position: Sitting, Cuff Size: Adult)   Pulse 111   Temp 98.7 °F (37.1 °C) (Temporal)   Ht 152.4 cm (60\")   Wt 60.3 kg (133 lb)   SpO2 96%   BMI 25.97 kg/m²     Body mass index is 25.97 kg/m².    Physical Exam   Cardiovascular: Normal rate.     Pulmonary/Chest  Effort normal.     Cardiovascular: Normal rate    Pulmonary/Chest: Effort normal    Skin: warm and dry, afebrile    Right upper thigh: medial posterior thigh wound measuring 10.5cm by 3cm by 3cm deep, tunneling at 3o'clock at 3cm. More lateral Posterior wound measuring 9cm by 2cm by 2cm, no tunneling,  there is a skin bridge in the wound bed itself. Will pack black foam over the skin bridge to facilitate adherence to wound bed. Minimal amount of green to yellow bloody drainage, no odor noted, no erythema. Wound bed beefy red. Remaining sutures on external incision removed, retained sutures in wound bed also excised.     Right calf: back of right calf blistered area measuring 9cm by 10cm by 0.2cm. central pressure area, necrotic approx 1cm diameter. Upper part of wound yellow slough removed with scissors and pick ups to healthy " dermis,  measures approx 1x2.5cm. sloughing skin removed from juanito wound.  Calf is tight and swollen with ecchymosis from surgery, due to limited mobility and gravity fluid collected in this area creating a blister.   Right inner leg incision at medial knee and mid calf small open areas, covered with Vaseline gauze. Posterior calf covered with Vaseline gauze also.  Generalized edema right lower extremity, skin warm and dry, good neurovascular checks, + palpable pedal pulses    Result Review :     Wound culture results review with patient in the room with her .  Discussed cont current antibiotics    Assessment and Plan      Diagnoses and all orders for this visit:    1. Postoperative wound dehiscence, sequela (Primary)  -     Ambulatory Referral to Home Health (Outpatient)    2. Dehiscence of operative wound, sequela      Plan:  Trimmed fibrin tissue from blisters on back of right calf. Placed vaseline gauze over blistered right calf area with gauze, kerlix and ace wrap. Continue using ace wrap for compression and keeping leg elevated to reduce swelling. Placed wound vac on upper thigh. Applied matisol to keep dressing in place. Advised patient to do dressing changes on Monday, Wednesday and Friday with home health. Patient is to still limit activity, shower right before wound vac dressing is due to be changed. Only ambulate on steps when absolutely necessary. RTC in 1 week for wound check and dressing change. Patient is aware to call the office with any other concerns.    Scribed by Sindi Narvaez MA, acting as a scribe for ANDREI Dave, 11/20/23 11:15 EST.  ANDREI Dave's signature on the note affirms that the note adequately documents the care provided.        Patient was given instructions and counseling regarding her condition. Please see specific information pulled into the AVS if appropriate.     ANDREI Dave  11/20/2023       Answers submitted by the patient for this  visit:  Other (Submitted on 11/19/2023)  Please describe your symptoms.: Wound care  Have you had these symptoms before?: Yes  How long have you been having these symptoms?: Greater than 2 weeks  Please list any medications you are currently taking for this condition.: Iron pills , Cefdinir , Hydrocodone, Ibeth sofener  Primary Reason for Visit (Submitted on 11/19/2023)  What is the primary reason for your visit?: Other

## 2023-11-14 NOTE — PROGRESS NOTES
Preliminary results do show bacteria growth, I will send in an antibiotic to get started on. I may need to change it depending on the final results

## 2023-11-15 ENCOUNTER — TELEMEDICINE (OUTPATIENT)
Dept: FAMILY MEDICINE CLINIC | Facility: CLINIC | Age: 43
End: 2023-11-15
Payer: OTHER GOVERNMENT

## 2023-11-15 VITALS — HEIGHT: 60 IN | BODY MASS INDEX: 26.11 KG/M2 | WEIGHT: 133 LBS

## 2023-11-15 DIAGNOSIS — D64.9 ANEMIA, UNSPECIFIED TYPE: Primary | ICD-10-CM

## 2023-11-15 NOTE — ASSESSMENT & PLAN NOTE
Patient on iron supplementation, we will recheck CBC and iron panel, patient may wait until she is able to be released from bedrest prior to obtaining labs.

## 2023-11-15 NOTE — PROGRESS NOTES
Chief Complaint  Follow-up anemia    SUBJECTIVE  Angelique Cordon presents to [facility]     You have chosen to receive care through a telehealth visit.  Do you consent to use a video/audio connection for your medical care today? Yes    Patient being seen today via FaceTime, patient is at home, provider is in office    Patient reports she is currently still on bedrest, states she is feeling much better.    Patient was admitted to the hospital with anemia requiring 2 units of PRBC status post surgery.  Patient did not have any preop labs done, most recent CBC that was available for comparison was a couple years ago but was normal at that time.  Patient denies any known history of anemia        History of Present Illness  Past Medical History:   Diagnosis Date    Abnormal bone density screening     Anxiety     Lipedema of lower extremity     LEGS AND ARMS    Pap smear for cervical cancer screening 2018    Varicose vein of leg     Visit for screening mammogram       Family History   Problem Relation Age of Onset    Diabetes Mother     Sleep apnea Mother     COPD Mother     Hypertension Mother     Colon cancer Father     Malig Hyperthermia Neg Hx       Past Surgical History:   Procedure Laterality Date    EXCISION LESION Left 4/1/2022    Procedure: EXCISION CYST LEFT AXILLARY;  Surgeon: Rosalie Talley MD;  [place] [facility] [place] [facility] [place]  Service: General;  Laterality: Left;    FOOT SURGERY Left 2003/2009 HAD 3 DIFFERENT SURGERIES    L FOOT/TENDON RELEASE PLANTAR FASIA    LIPOSUCTION Right 10/24/2023    Procedure: LIPOSUCTION, Right Lower Extremity Circumferential Liposuction with Skin Resection and negative pressure dressing;  Surgeon: Raffaele Willams MD;  [place] [facility] [place] [facility] [place]  Service: Plastics;  Laterality: Right;        Current Outpatient Medications:     acetaminophen (TYLENOL) 500 MG tablet, Take 2 tablets by mouth Every 6 (Six) Hours As Needed for Moderate Pain for up to  "18 doses., Disp: 18 tablet, Rfl: 0    cefdinir (OMNICEF) 300 MG capsule, Take 1 capsule by mouth 2 (Two) Times a Day for 14 days., Disp: 28 capsule, Rfl: 0    ferrous sulfate 325 (65 FE) MG tablet, Take 1 tablet by mouth Daily With Breakfast for 30 days., Disp: 30 tablet, Rfl: 0    HYDROcodone-acetaminophen (Norco) 5-325 MG per tablet, Take 1 tablet by mouth Every 6 (Six) Hours As Needed for Severe Pain for up to 30 doses., Disp: 30 tablet, Rfl: 0    ibuprofen (ADVIL,MOTRIN) 800 MG tablet, Take 1 tablet by mouth Every 8 (Eight) Hours As Needed for Mild Pain., Disp: 30 tablet, Rfl: 2    Melatonin 10 MG/ML liquid, Take 5 mg by mouth Every Night., Disp: , Rfl:     Vortioxetine HBr (Trintellix) 20 MG tablet, Take 1 tablet by mouth Daily., Disp: 90 tablet, Rfl: 1    OBJECTIVE  Vital Signs:   Ht 152.4 cm (60\")   Wt 60.3 kg (133 lb)   LMP 10/17/2023   BMI 25.97 kg/m²    Estimated body mass index is 25.97 kg/m² as calculated from the following:    Height as of this encounter: 152.4 cm (60\").    Weight as of this encounter: 60.3 kg (133 lb).     Wt Readings from Last 3 Encounters:   11/15/23 60.3 kg (133 lb)   11/13/23 60.3 kg (133 lb)   10/29/23 62.2 kg (137 lb 2 oz)     BP Readings from Last 3 Encounters:   11/13/23 104/68   11/07/23 102/67   10/30/23 94/53       Physical Exam  Constitutional:       Appearance: Normal appearance.   HENT:      Head: Normocephalic and atraumatic.      Right Ear: External ear normal.      Left Ear: External ear normal.   Pulmonary:      Effort: Pulmonary effort is normal.   Neurological:      Mental Status: She is alert and oriented to person, place, and time.   Psychiatric:         Mood and Affect: Mood normal.         Behavior: Behavior normal.         Thought Content: Thought content normal.         Judgment: Judgment normal.          Result Review    CMP          3/23/2023    12:18 10/29/2023    13:08   CMP   Glucose 71  120    BUN 14  8    Creatinine 0.76  0.64    EGFR 99.9  112.6  "   Sodium 140  138    Potassium 3.9  3.7    Chloride 104  102    Calcium 8.9  9.0    Total Protein 7.1  6.6    Albumin 4.3  3.4    Globulin 2.8  3.2    Total Bilirubin 0.2  0.3    Alkaline Phosphatase 51  47    AST (SGOT) 25  10    ALT (SGPT) 32  9    Albumin/Globulin Ratio 1.5  1.1    BUN/Creatinine Ratio 18.4  12.5    Anion Gap 13.0  10.2      CBC          10/29/2023    13:08 10/30/2023    01:09   CBC   WBC 9.95  9.40    RBC 2.26  2.68    Hemoglobin 7.0  8.3    Hematocrit 21.5  24.7    MCV 95.1  92.2    MCH 31.0  31.0    MCHC 32.6  33.6    RDW 13.2  13.9    Platelets 348  268        No Images in the past 120 days found..     The above data has been reviewed by ANDREI Olivera 11/15/2023 07:28 EST.          Patient Care Team:  Hannah Parrish APRN as PCP - General (Nurse Practitioner)  Rosalie Talley MD as Consulting Physician (General Surgery)  Raffaele Willams MD as Consulting Physician (Plastic Surgery)            ASSESSMENT & PLAN    Diagnoses and all orders for this visit:    1. Anemia, unspecified type (Primary)  Assessment & Plan:  Patient on iron supplementation, we will recheck CBC and iron panel, patient may wait until she is able to be released from bedrest prior to obtaining labs.    Orders:  -     CBC w AUTO Differential; Future  -     Iron Profile; Future  -     Vitamin B12; Future         Tobacco Use: Low Risk  (11/13/2023)    Patient History     Smoking Tobacco Use: Never     Smokeless Tobacco Use: Never     Passive Exposure: Never       Follow Up     Return in about 4 weeks (around 12/13/2023), or if symptoms worsen or fail to improve.        Patient was given instructions and counseling regarding her condition or for health maintenance advice. Please see specific information pulled into the AVS if appropriate.   I have reviewed information obtained and documented by others and I have confirmed the accuracy of this documented note.    ANDREI Olivera

## 2023-11-16 NOTE — PROGRESS NOTES
Serratia and Pseudomonas noted on wound culture, cont Omnicef.  Will evaluated wound and adjust antibiotics if no improvement with wound healing

## 2023-11-17 LAB
BACTERIA SPEC AEROBE CULT: ABNORMAL
BACTERIA SPEC AEROBE CULT: ABNORMAL
GRAM STN SPEC: ABNORMAL
GRAM STN SPEC: ABNORMAL

## 2023-11-17 NOTE — PROGRESS NOTES
Serratia and pseudomonas bacteria noted. Cont omnicef, will evaluated wound and if not improved with f/u can adjust antibiotics. Cont damp to dry wound care until wound vac available

## 2023-11-20 ENCOUNTER — OFFICE VISIT (OUTPATIENT)
Dept: PLASTIC SURGERY | Facility: CLINIC | Age: 43
End: 2023-11-20
Payer: OTHER GOVERNMENT

## 2023-11-20 VITALS
HEIGHT: 60 IN | WEIGHT: 133 LBS | SYSTOLIC BLOOD PRESSURE: 114 MMHG | BODY MASS INDEX: 26.11 KG/M2 | DIASTOLIC BLOOD PRESSURE: 71 MMHG | OXYGEN SATURATION: 96 % | TEMPERATURE: 98.7 F | HEART RATE: 111 BPM

## 2023-11-20 DIAGNOSIS — T81.31XS DEHISCENCE OF OPERATIVE WOUND, SEQUELA: ICD-10-CM

## 2023-11-20 DIAGNOSIS — T81.31XS POSTOPERATIVE WOUND DEHISCENCE, SEQUELA: Primary | ICD-10-CM

## 2023-11-20 DIAGNOSIS — L08.9 SKIN INFECTION, BACTERIAL: ICD-10-CM

## 2023-11-20 DIAGNOSIS — B96.89 SKIN INFECTION, BACTERIAL: ICD-10-CM

## 2023-11-20 RX ORDER — CEFDINIR 300 MG/1
300 CAPSULE ORAL 2 TIMES DAILY
Qty: 28 CAPSULE | Refills: 0 | Status: SHIPPED | OUTPATIENT
Start: 2023-11-20 | End: 2023-12-04

## 2023-11-21 ENCOUNTER — TELEPHONE (OUTPATIENT)
Dept: PLASTIC SURGERY | Facility: CLINIC | Age: 43
End: 2023-11-21
Payer: OTHER GOVERNMENT

## 2023-11-21 NOTE — TELEPHONE ENCOUNTER
Gini from [facility] calls me back on A fax number.    Faxed referral today at [phone].

## 2023-11-22 NOTE — PROGRESS NOTES
"Chief Complaint  Wound Check (Dressing change)  wound vac dressing change    Subjective    Worried about the drainage smell    History of Present Illness  Angelique Cordon is a 43 y.o. female who presents to [facility] for in office procedure for closure of right thigh wound. LIPOSUCTION, Right Lower Extremity Circumferential Liposuction with Skin Resection and negative pressure dressing. 10/24/2023    Patient comes in today for wound check and dressing change. Doing better. Wound vac was changed Friday. Denies pain.    Allergies: Patient has no known allergies.  Allergies Reconciled.    Review of Systems   Constitutional: Negative.    HENT: Negative.     Eyes: Negative.    Respiratory: Negative.     Cardiovascular: Negative.    Gastrointestinal: Negative.    Endocrine: Negative.    Genitourinary: Negative.    Skin:  Positive for wound and bruise.   Allergic/Immunologic: Negative.    Neurological: Negative.    Psychiatric/Behavioral: Negative.        All review of system has been reviewed and it  is negative except the ones note above.     Objective     /76 (BP Location: Left arm, Patient Position: Sitting, Cuff Size: Adult)   Pulse 94   Temp 98.7 °F (37.1 °C) (Temporal)   Ht 152.4 cm (60\")   SpO2 98%   BMI 25.97 kg/m²     Body mass index is 25.97 kg/m².    Physical Exam   Vitals reviewed.  Constitutional  She appears well-developed and well-nourished.     Eyes  System normal.       Cardiovascular: Normal rate.     Pulmonary/Chest  Effort normal.     Skin  Skin is warm and dry.     Cardiovascular: Normal rate    Pulmonary/Chest: Effort normal    Skin: warm and dry, afebrile    Right upper posterior thigh: wound measurements- medial wound #1, tunneling at 1oclock measuring 1.5cm, 11.8cm by 5cm by 2cm, #2 lateral right thigh wound measuring 7.5cm by 2cm by 0.5cm with no tunneling present, clean, pink beefy wound bed, some dissolvable residual sutures " present, loose sutures removed, no signs of infection.  Odor noted with drainage and sponge removal, no odor when wound cleansed. Scant amount of bleeding with removal of sponge, skin bridge in wound #1 adhered to wound bed.    Right calf: healing incision with scabbed areas, no open areas, some tenderness and discomfort, mild swelling. Entire leg ecchymosis improving and edema improved with compression and improved elevation after wound vac applied. Blisters to posterior calf dry, scattered areas of necrosis noted, healing around the areas, pink skin noted. Will continue with Vaseline gauze and compression. Not debrided with this visit.    Result Review :  NO new results to review this visit    Assessment and Plan      Diagnoses and all orders for this visit:    1. Dehiscence of operative wound, initial encounter (Primary)        Plan:  Photos obtained today and put into patient's chart.   Removed spitting sutures from wound bed. Continue doing wound vac dressing changes Monday, Wednesday, and Friday with home health nurse. Continue using vaseline gauze and 4x4's on lower calf and wrapping with kerlix and ace, every other day. Continue elevation. Patient is aware to take it easy and to limit physical activity, estimate 80/20 on bedrest. Keep leg elevated when sitting. We will get with Sarah on ordering more wound care equipment, canisters.   RTC 1 week for dressing change and wound check.  Patient instructed to call office for any questions or concerns and verbalized understanding of all instructions given.   Time spent with patient to do wound care and dressing changes, 45 minutes    Scribed by Sindi Narvaez MA, acting as a scribe for ANDREI Dave, 11/27/23 10:44 EST.  ANDREI Dave's signature on the note affirms that the note adequately documents the care provided.      Patient was given instructions and counseling regarding her condition. Please see specific information pulled into the  AVS if appropriate.     Wendy Salter, APRN  11/27/2023

## 2023-11-27 ENCOUNTER — OFFICE VISIT (OUTPATIENT)
Dept: PLASTIC SURGERY | Facility: CLINIC | Age: 43
End: 2023-11-27
Payer: OTHER GOVERNMENT

## 2023-11-27 VITALS
DIASTOLIC BLOOD PRESSURE: 76 MMHG | HEART RATE: 94 BPM | BODY MASS INDEX: 25.97 KG/M2 | HEIGHT: 60 IN | TEMPERATURE: 98.7 F | OXYGEN SATURATION: 98 % | SYSTOLIC BLOOD PRESSURE: 110 MMHG

## 2023-11-27 DIAGNOSIS — T81.31XA DEHISCENCE OF OPERATIVE WOUND, INITIAL ENCOUNTER: Primary | ICD-10-CM

## 2023-11-27 NOTE — PROGRESS NOTES
"Chief Complaint  Post-op Follow-up (Follow up on wound vac)    Subjective    It's still very tender    History of Present Illness  Angelique Cordon is a 43 y.o. female who presents to [facility] for post operative follow up LIPOSUCTION, Right Lower Extremity Circumferential Liposuction with Skin Resection and negative pressure dressing on 10/24/23    Patient comes in today for wound check and dressing change.  Doing ok.  Discomfort 3/10.  Feels tight and has noted a bump in lower right leg.      Allergies: Patient has no known allergies.  Allergies Reconciled.    Review of Systems   Constitutional:  Positive for activity change.   HENT: Negative.     Eyes: Negative.    Respiratory: Negative.     Cardiovascular: Negative.    Gastrointestinal: Negative.    Endocrine: Negative.    Genitourinary: Negative.    Musculoskeletal:  Positive for gait problem and myalgias.   Skin:  Positive for wound and bruise.   Allergic/Immunologic: Negative.    Hematological: Negative.    Psychiatric/Behavioral: Negative.        All review of system has been reviewed and it  is negative except the ones note above.     Objective     /72 (BP Location: Left arm, Patient Position: Sitting, Cuff Size: Adult)   Temp 98.4 °F (36.9 °C) (Temporal)   Ht 152.4 cm (60\")   Wt 60.3 kg (133 lb)   SpO2 98%   BMI 25.97 kg/m²     Body mass index is 25.97 kg/m².    Physical Exam   Vitals reviewed.  Constitutional  She appears well-developed and well-nourished.     Eyes  System normal.       Cardiovascular: Normal rate.     Pulmonary/Chest  Effort normal.     Skin  Skin is warm and dry.     Psychiatric  She has a normal mood and affect.     Cardiovascular: Normal rate    Pulmonary/Chest: Effort normal    Skin: right lower extremity dry flaky skin, ecchymosis and edema improving with compression    Right upper posterior thigh: #1 medial wound  0.5cm (3cm) of tunneling left at 3 oclock, wound " measuring 10.3cm by 4.2cm by 0.5 deep ([id number]) # 2 lateral wound measuring 6.5cm by 2.1cm by 0.5cm ([id number]), both wounds beefy pink wound bed, no signs of infection, scant bleeding with sponge removal. Odor with sponge, resolved when wound cleansed.     Right calf: healing scattered necrotic areas, devitalized eschar debrided with scissors to dermal layer, pink wound bed noted, juanito wound skin peeling as edema improves, scant yellow drainage noted and cleansed, bandage intact.    Result Review :   No new results to review with visit    Assessment and Plan      Diagnoses and all orders for this visit:    1. Dehiscence of operative wound, initial encounter (Primary)  -     HYDROcodone-acetaminophen (Norco) 5-325 MG per tablet; Take 1 tablet by mouth Every 6 (Six) Hours As Needed for Severe Pain for up to 30 doses.  Dispense: 30 tablet; Refill: 0    2. Pain  -     HYDROcodone-acetaminophen (Norco) 5-325 MG per tablet; Take 1 tablet by mouth Every 6 (Six) Hours As Needed for Severe Pain for up to 30 doses.  Dispense: 30 tablet; Refill: 0    Pain related to wound vac dressing changes, tolerable with pre treatment of pain medication. Will refill as cont with wound vac dressing changes 3 times a week    Plan:  Photos obtained on handheld. Continued progress with wound healing.   Continue dressing changes on Wednesday and Fridays with home health nurse. Re-dressed bottom calf with vaseline gauze, kerlix and compression ace wrap, change every other day at home. Keep leg elevated as much as she can while sitting.   Complete antibiotics  RTC 1 week for wound check.  patient instructed to call office for any questions or concerns and verbalized understanding of all instructions given.      Scribed by Sindi Narveaz MA, acting as a scribe for ANDREI Dave, 12/04/23 10:10 EST.  ANDREI Dave's signature on the note affirms that the note adequately documents the care provided.        Patient was given  instructions and counseling regarding her condition. Please see specific information pulled into the AVS if appropriate.     Wendy Salter, ANDREI  12/04/2023         Answers submitted by the patient for this visit:  Other (Submitted on 11/28/2023)  Please describe your symptoms.: Follow up/wound care  Have you had these symptoms before?: Yes  How long have you been having these symptoms?: Greater than 2 weeks  Please list any medications you are currently taking for this condition.: Iron pills 1X a day , Cefdinir 300mg 2x a day, Hydocodone 1x a day , Stool sofener  Please describe any probable cause for these symptoms. : Post op surgery  Primary Reason for Visit (Submitted on 11/28/2023)  What is the primary reason for your visit?: Other

## 2023-11-28 ENCOUNTER — PATIENT MESSAGE (OUTPATIENT)
Dept: FAMILY MEDICINE CLINIC | Facility: CLINIC | Age: 43
End: 2023-11-28
Payer: OTHER GOVERNMENT

## 2023-11-28 ENCOUNTER — LAB (OUTPATIENT)
Dept: LAB | Facility: HOSPITAL | Age: 43
End: 2023-11-28
Payer: OTHER GOVERNMENT

## 2023-11-28 DIAGNOSIS — D64.9 ANEMIA, UNSPECIFIED TYPE: ICD-10-CM

## 2023-11-28 LAB
BASOPHILS # BLD AUTO: 0.03 10*3/MM3 (ref 0–0.2)
BASOPHILS NFR BLD AUTO: 0.5 % (ref 0–1.5)
DEPRECATED RDW RBC AUTO: 45.9 FL (ref 37–54)
EOSINOPHIL # BLD AUTO: 0.21 10*3/MM3 (ref 0–0.4)
EOSINOPHIL NFR BLD AUTO: 3.4 % (ref 0.3–6.2)
ERYTHROCYTE [DISTWIDTH] IN BLOOD BY AUTOMATED COUNT: 13.3 % (ref 12.3–15.4)
HCT VFR BLD AUTO: 33.6 % (ref 34–46.6)
HGB BLD-MCNC: 11.3 G/DL (ref 12–15.9)
IRON 24H UR-MRATE: 64 MCG/DL (ref 37–145)
IRON SATN MFR SERPL: 22 % (ref 20–50)
LYMPHOCYTES # BLD AUTO: 1.29 10*3/MM3 (ref 0.7–3.1)
LYMPHOCYTES NFR BLD AUTO: 20.8 % (ref 19.6–45.3)
MCH RBC QN AUTO: 32.1 PG (ref 26.6–33)
MCHC RBC AUTO-ENTMCNC: 33.6 G/DL (ref 31.5–35.7)
MCV RBC AUTO: 95.5 FL (ref 79–97)
MONOCYTES # BLD AUTO: 0.33 10*3/MM3 (ref 0.1–0.9)
MONOCYTES NFR BLD AUTO: 5.3 % (ref 5–12)
NEUTROPHILS NFR BLD AUTO: 4.3 10*3/MM3 (ref 1.7–7)
NEUTROPHILS NFR BLD AUTO: 69.5 % (ref 42.7–76)
PLATELET # BLD AUTO: 291 10*3/MM3 (ref 140–450)
PMV BLD AUTO: 14 FL (ref 6–12)
RBC # BLD AUTO: 3.52 10*6/MM3 (ref 3.77–5.28)
TIBC SERPL-MCNC: 286 MCG/DL (ref 298–536)
TRANSFERRIN SERPL-MCNC: 192 MG/DL (ref 200–360)
VIT B12 BLD-MCNC: 715 PG/ML (ref 211–946)
WBC NRBC COR # BLD AUTO: 6.19 10*3/MM3 (ref 3.4–10.8)

## 2023-11-28 PROCEDURE — 36415 COLL VENOUS BLD VENIPUNCTURE: CPT

## 2023-11-28 PROCEDURE — 85025 COMPLETE CBC W/AUTO DIFF WBC: CPT

## 2023-11-28 PROCEDURE — 82607 VITAMIN B-12: CPT

## 2023-11-28 PROCEDURE — 83540 ASSAY OF IRON: CPT

## 2023-11-28 PROCEDURE — 84466 ASSAY OF TRANSFERRIN: CPT

## 2023-11-29 ENCOUNTER — PATIENT MESSAGE (OUTPATIENT)
Dept: PLASTIC SURGERY | Facility: CLINIC | Age: 43
End: 2023-11-29
Payer: OTHER GOVERNMENT

## 2023-11-29 RX ORDER — FERROUS SULFATE 325(65) MG
325 TABLET ORAL
Qty: 90 TABLET | Refills: 0 | Status: SHIPPED | OUTPATIENT
Start: 2023-11-29

## 2023-12-04 ENCOUNTER — OFFICE VISIT (OUTPATIENT)
Dept: PLASTIC SURGERY | Facility: CLINIC | Age: 43
End: 2023-12-04
Payer: OTHER GOVERNMENT

## 2023-12-04 VITALS
BODY MASS INDEX: 26.11 KG/M2 | OXYGEN SATURATION: 98 % | TEMPERATURE: 98.4 F | DIASTOLIC BLOOD PRESSURE: 72 MMHG | WEIGHT: 133 LBS | HEIGHT: 60 IN | SYSTOLIC BLOOD PRESSURE: 114 MMHG

## 2023-12-04 DIAGNOSIS — R52 PAIN: ICD-10-CM

## 2023-12-04 DIAGNOSIS — T81.31XA DEHISCENCE OF OPERATIVE WOUND, INITIAL ENCOUNTER: Primary | ICD-10-CM

## 2023-12-04 RX ORDER — HYDROCODONE BITARTRATE AND ACETAMINOPHEN 5; 325 MG/1; MG/1
1 TABLET ORAL EVERY 6 HOURS PRN
Qty: 30 TABLET | Refills: 0 | Status: SHIPPED | OUTPATIENT
Start: 2023-12-04

## 2023-12-06 DIAGNOSIS — M79.671 PAIN IN BOTH FEET: ICD-10-CM

## 2023-12-06 DIAGNOSIS — M79.672 PAIN IN BOTH FEET: ICD-10-CM

## 2023-12-06 RX ORDER — IBUPROFEN 800 MG/1
800 TABLET ORAL EVERY 8 HOURS PRN
Qty: 90 TABLET | Refills: 0 | Status: SHIPPED | OUTPATIENT
Start: 2023-12-06

## 2023-12-07 NOTE — PROGRESS NOTES
"Chief Complaint  Wound Check (Right lower extremity)    Subjective  I'm doing ok    History of Present Illness  Angelique Cordon is a 43 y.o. female who presents to [facility] for post operative follow up LIPOSUCTION, Right Lower Extremity Circumferential Liposuction with Skin Resection and negative pressure dressing on 10/24/23    Patient comes in today for wound check and dressing change.      Allergies: Patient has no known allergies.  Allergies Reconciled.    Review of Systems   Constitutional:  Positive for activity change.   HENT: Negative.     Eyes: Negative.    Respiratory: Negative.     Cardiovascular: Negative.    Gastrointestinal: Negative.    Endocrine: Negative.    Genitourinary: Negative.    Musculoskeletal:  Positive for myalgias.   Skin:  Positive for wound and bruise.   Allergic/Immunologic: Negative.    Neurological: Negative.    Hematological: Negative.    Psychiatric/Behavioral: Negative.        All review of system has been reviewed and it  is negative except the ones note above.     Objective     /74 (BP Location: Right arm, Patient Position: Sitting, Cuff Size: Adult)   Pulse 83   Temp 98.7 °F (37.1 °C) (Temporal)   Ht 152.4 cm (60\")   Wt 60.3 kg (133 lb)   SpO2 95%   BMI 25.97 kg/m²     Body mass index is 25.97 kg/m².    Physical Exam   Vitals reviewed.  Constitutional  She appears well-developed and well-nourished.     Eyes  System normal.       Cardiovascular: Normal rate.     Pulmonary/Chest  Effort normal.     Skin  Skin is warm and dry.     Psychiatric  She has a normal mood and affect.     Cardiovascular: Normal rate    Pulmonary/Chest: Effort normal      Right upper posterior thigh:  the two wound have converged to 1 wound, the thin skin bridge dissolved. Overall wound has improved, wound bed beefy red, minimal bleeding with sponge removal, stopped with minimal pressure. Odor noted with drain and sponge removal, improved " after cleansing wound.  Wound 19.5x3.5x1cm. tunnel resolved    Right calf: hematoma and edema improved, still tight inner groin to inner knee. Ecchymosis noted. Lower leg. Calf edema and ecchymosis is improved, necrotic areas are improving. 1 area 0.5 cm diameter and approx 0.1 deep. Other scattered areas pink healing skin. Inner calf incision line debrided of necrotic tissue with scissors to dermis, adhered yellow slough noted middle third of incision, largest area approx [id number] cm        Result Review :     No new results to review with visit    Assessment and Plan      Diagnoses and all orders for this visit:    1. Dehiscence of operative wound, initial encounter (Primary)          Plan:  Cont wound vac to posterior right thigh, MWF  Stop Vaseline gauze and use alginate AG to areas on right posterior and inner calf. Change every other day.  Discussed alternating time up on feet with elevating leg to help with edema, don't recommend standing or ambulating around the house longer than 20-30 at an interval  Cont Jesse, nutritional supplement  Cont Home Health for wound vac and dressing changes        Patient was given instructions and counseling regarding her condition. Please see specific information pulled into the AVS if appropriate.     Sindi Narvaez MA  12/11/2023             Answers submitted by the patient for this visit:  Other (Submitted on 12/9/2023)  Please describe your symptoms.: Wound care  Have you had these symptoms before?: Yes  How long have you been having these symptoms?: Greater than 2 weeks  Please describe any probable cause for these symptoms. : Wound vac treatment  Primary Reason for Visit (Submitted on 12/9/2023)  What is the primary reason for your visit?: Other

## 2023-12-11 ENCOUNTER — OFFICE VISIT (OUTPATIENT)
Dept: PLASTIC SURGERY | Facility: CLINIC | Age: 43
End: 2023-12-11
Payer: OTHER GOVERNMENT

## 2023-12-11 VITALS
HEART RATE: 83 BPM | TEMPERATURE: 98.7 F | HEIGHT: 60 IN | BODY MASS INDEX: 26.11 KG/M2 | SYSTOLIC BLOOD PRESSURE: 117 MMHG | OXYGEN SATURATION: 95 % | DIASTOLIC BLOOD PRESSURE: 74 MMHG | WEIGHT: 133 LBS

## 2023-12-11 DIAGNOSIS — T81.31XA DEHISCENCE OF OPERATIVE WOUND, INITIAL ENCOUNTER: Primary | ICD-10-CM

## 2023-12-11 PROCEDURE — 99024 POSTOP FOLLOW-UP VISIT: CPT | Performed by: NURSE PRACTITIONER

## 2023-12-11 PROCEDURE — 97606 NEG PRS WND THER DME>50 SQCM: CPT | Performed by: NURSE PRACTITIONER

## 2023-12-11 NOTE — PROGRESS NOTES
"Chief Complaint  Wound Check (Dressing change)    Subjective  I'm having weird type of sensation in my leg      History of Present Illness  Angelique Cordon is a 43 y.o. female who presents to [facility] for post operative follow up LIPOSUCTION, Right Lower Extremity Circumferential Liposuction with Skin Resection and negative pressure dressing on 10/24/23.    Patient comes in today for wound check and dressing change.          Allergies: Patient has no known allergies.  Allergies Reconciled.    Review of Systems   Constitutional:  Positive for activity change.   HENT: Negative.     Eyes: Negative.    Respiratory: Negative.     Cardiovascular: Negative.    Gastrointestinal: Negative.    Endocrine: Negative.    Genitourinary: Negative.    Musculoskeletal:  Positive for gait problem and myalgias.   Skin:  Positive for wound and bruise.   Allergic/Immunologic: Negative.    Hematological: Negative.    Psychiatric/Behavioral: Negative.        All review of system has been reviewed and it  is negative except the ones note above.     Objective     /73 (BP Location: Left arm, Patient Position: Sitting, Cuff Size: Adult)   Pulse 90   Temp 99.3 °F (37.4 °C) (Temporal)   Ht 152.4 cm (60\")   Wt 61.4 kg (135 lb 6.4 oz)   SpO2 98%   BMI 26.44 kg/m²     Body mass index is 26.44 kg/m².    Physical Exam   Vitals reviewed.  Constitutional  She appears well-developed and well-nourished.     Eyes  System normal.       Cardiovascular: Normal rate.     Pulmonary/Chest  Effort normal.     Skin  Skin is warm and dry.     Psychiatric  She has a normal mood and affect.     Extremities    Legs:        Legs:      Leg comments: Ecchymosis and edema improving right inner thigh and lower extremity. She was worried about ace wrap marks on her leg after cleaning her daughters room. We discussed taking frequent breaks to decrease RLE edema. She is starting to ambulate better with less " limp on the right side. Instructed her to look up with walking and concentrate on proper posture for gait. Continue gradually bending the knee.       Cardiovascular: Normal rate    Pulmonary/Chest: Effort normal    Skin:   Right upper posterior thigh:  ( Wound 19.5x3.5x1cm ) 1 week ago, now  measures [id number]  Wound bed beefy red with good tissue granulation noted. Cate wound edges adhered to wound bed. Odor noted with dressing change, improved after cleansed with NS. Scant amount of bleeding with sponge removal, stops with minimal pressure. Pain with dressing changes much improved with pain medication, and becoming less tender.  Inner groin and thigh incision doing well, open areas below knee are healing  Right calf:  medial knee and inner calf wounds in incision line that were necrotic are now beefy red wound bed, largest [id number], dry skin from prior surgical swelling sloughing, pink healing skin noted, open necrotic area also healing, [id number]. Alginate ag applied to these areas    Result Review : no new results to review this visit      Assessment and Plan      Diagnoses and all orders for this visit:    1. Dehiscence of operative wound, sequela (Primary)        Plan:   Continue with wound vac and alginate ag dressing changes. Will adjust three time a week sched for wound vac to accommodate Neelam.   patient instructed to call office for any questions or concerns and verbalized understanding of all instructions given.    RTC 1 week        Patient was given instructions and counseling regarding her condition. Please see specific information pulled into the AVS if appropriate.     ANDREI Dave  12/18/2023             Answers submitted by the patient for this visit:  Other (Submitted on 12/11/2023)  Please describe your symptoms.: Wound care  Have you had these symptoms before?: Yes  How long have you been having these symptoms?: Greater than 2 weeks  Primary Reason for Visit (Submitted on  12/11/2023)  What is the primary reason for your visit?: Other

## 2023-12-18 ENCOUNTER — OFFICE VISIT (OUTPATIENT)
Dept: PLASTIC SURGERY | Facility: CLINIC | Age: 43
End: 2023-12-18
Payer: OTHER GOVERNMENT

## 2023-12-18 VITALS
BODY MASS INDEX: 26.58 KG/M2 | SYSTOLIC BLOOD PRESSURE: 111 MMHG | OXYGEN SATURATION: 98 % | TEMPERATURE: 99.3 F | HEIGHT: 60 IN | DIASTOLIC BLOOD PRESSURE: 73 MMHG | WEIGHT: 135.4 LBS | HEART RATE: 90 BPM

## 2023-12-18 DIAGNOSIS — T81.31XS DEHISCENCE OF OPERATIVE WOUND, SEQUELA: Primary | ICD-10-CM

## 2023-12-18 NOTE — PROGRESS NOTES
"Chief Complaint  Wound Check (Dressing change)    Subjective  the vac beeped all night      History of Present Illness  Angelique Cordon is a 43 y.o. female who presents to [facility] for post operative follow up LIPOSUCTION, Right Lower Extremity Circumferential Liposuction with Skin Resection and negative pressure dressing on 10/24/23.    Patient comes in today for wound check and dressing change.  Had problems with wound vac over the weekend with it beeping. Doing well.        Allergies: Patient has no known allergies.  Allergies Reconciled.    Review of Systems   Constitutional:  Positive for activity change.   HENT: Negative.     Eyes: Negative.    Respiratory: Negative.     Cardiovascular: Negative.    Gastrointestinal: Negative.    Endocrine: Negative.    Genitourinary: Negative.    Musculoskeletal:  Positive for gait problem, joint swelling and myalgias.   Skin:  Positive for wound and bruise.   Allergic/Immunologic: Negative.    Hematological: Negative.    Psychiatric/Behavioral: Negative.        All review of system has been reviewed and it  is negative except the ones note above.     Objective     Ht 152.4 cm (60\")   Wt 61.9 kg (136 lb 8 oz)   BMI 26.66 kg/m²     Body mass index is 26.66 kg/m².    Physical Exam   Vitals reviewed.  Constitutional  She appears well-developed and well-nourished.     Eyes  System normal.       Cardiovascular: Normal rate.     Pulmonary/Chest  Effort normal.     Skin  Skin is warm and dry.     Psychiatric  She has a normal mood and affect.     Extremities    Legs:        Legs:      Leg comments: Ecchymosis and edema improving right inner thigh and lower extremity. She was worried about ace wrap marks on her leg after cleaning her daughters room. We discussed taking frequent breaks to decrease RLE edema. She is starting to ambulate better with less limp on the right side. Instructed her to look up with walking and concentrate " on proper posture for gait. Continue gradually bending the knee, right knee still swollen, echhymosis and hematoma at medial knee improving.       Cardiovascular: Normal rate    Pulmonary/Chest: Effort normal    Skin: warm and dry   Right upper posterior thigh:  Wound [id number] (37cdm6s9.8 )  Wound bed beefy red with good tissue granulation noted. Cate wound edges adhered to wound bed. Odor noted with dressing change, improved after cleansed with NS. Scant amount of bleeding with sponge removal, stops with minimal pressure.     Inner groin and thigh incision doing well, open areas below knee are healing  Right calf:  medial knee and inner calf wounds healing well with silver alginate dressing, wound beds pink, loose slough removed with dressing changes and posterior  calf wounds  that were necrotic are now healed with scabbed areas. Denies pain. No drainage. Alginate ag applied to these areas    Result Review :     No new results to review this visit    Assessment and Plan      Diagnoses and all orders for this visit:    1. Dehiscence of operative wound, sequela (Primary)    2. Postoperative follow-up    3. Lipedema          Plan:   Photos obtained on handheld and in clinic.   2 week HOLD on the wound vac for now. Will change to Silver alginate daily as depth of wound is down to 0.3 cm.  We will fax an order to home health.   Apply silver alginate everyday to wound on hip and 3 times a week for calf. Cont ace wrap for hematoma and swelling to right lower extremity, massage inner thigh and knee.   Patient is aware to be off from work for another 2 weeks until we re-evaluate at next office visit. As sitting at a computer for long periods of time can delay wound healing.   Patient is aware to call the office with any other concerns and verbalized understanding of instructions given.   RTC in 2 weeks.    Scribed by Sindi Narvaez MA, acting as a scribe for ANDREI Dave, 12/26/23 09:39 EST.  Wendy OLMSTEAD  ANDREI Salter's signature on the note affirms that the note adequately documents the care provided.      Patient was given instructions and counseling regarding her condition. Please see specific information pulled into the AVS if appropriate.     ANDREI Dave  12/26/2023             Answers submitted by the patient for this visit:  Other (Submitted on 12/11/2023)  Please describe your symptoms.: Wound care  Have you had these symptoms before?: Yes  How long have you been having these symptoms?: Greater than 2 weeks  Primary Reason for Visit (Submitted on 12/11/2023)  What is the primary reason for your visit?: Other    Answers submitted by the patient for this visit:  Other (Submitted on 12/24/2023)  Please describe your symptoms.: Wound care  Have you had these symptoms before?: Yes  How long have you been having these symptoms?: Greater than 2 weeks  Primary Reason for Visit (Submitted on 12/24/2023)  What is the primary reason for your visit?: Other

## 2023-12-26 ENCOUNTER — OFFICE VISIT (OUTPATIENT)
Dept: PLASTIC SURGERY | Facility: CLINIC | Age: 43
End: 2023-12-26
Payer: OTHER GOVERNMENT

## 2023-12-26 VITALS — WEIGHT: 136.5 LBS | BODY MASS INDEX: 26.8 KG/M2 | HEIGHT: 60 IN

## 2023-12-26 DIAGNOSIS — R60.9 LIPEDEMA: ICD-10-CM

## 2023-12-26 DIAGNOSIS — Z09 POSTOPERATIVE FOLLOW-UP: ICD-10-CM

## 2023-12-26 DIAGNOSIS — T81.31XS DEHISCENCE OF OPERATIVE WOUND, SEQUELA: Primary | ICD-10-CM

## 2024-01-02 DIAGNOSIS — T75.3XXA MOTION SICKNESS, INITIAL ENCOUNTER: ICD-10-CM

## 2024-01-02 RX ORDER — SCOLOPAMINE TRANSDERMAL SYSTEM 1 MG/1
PATCH, EXTENDED RELEASE TRANSDERMAL
Qty: 4 PATCH | Refills: 0 | Status: SHIPPED | OUTPATIENT
Start: 2024-01-02

## 2024-01-02 NOTE — PROGRESS NOTES
"Chief Complaint  Wound Check    Subjective  I'm making progress      History of Present Illness  Angelique Cordon is a 43 y.o. female who presents to [facility] for post operative follow up LIPOSUCTION, Right Lower Extremity Circumferential Liposuction with Skin Resection and negative pressure dressing on 10/24/23.    Patient comes in today for wound check and dressing change.      Allergies: Patient has no known allergies.  Allergies Reconciled.    Review of Systems   Constitutional:  Positive for activity change.   HENT: Negative.     Eyes: Negative.    Respiratory: Negative.     Cardiovascular: Negative.    Gastrointestinal: Negative.    Endocrine: Negative.    Genitourinary: Negative.    Musculoskeletal:  Positive for joint swelling and myalgias.   Skin:  Positive for wound and bruise.   Allergic/Immunologic: Negative.    Neurological: Negative.    Hematological: Negative.    Psychiatric/Behavioral: Negative.        All review of system has been reviewed and it  is negative except the ones note above.     Objective   Vitals:    01/09/24 0839   BP: 114/72   Pulse: 79   Temp: 98.7 °F (37.1 °C)   SpO2: 98%       /72 (BP Location: Left arm, Patient Position: Sitting, Cuff Size: Adult)   Pulse 79   Temp 98.7 °F (37.1 °C) (Temporal)   Ht 152.4 cm (60\")   Wt 61.8 kg (136 lb 3.2 oz)   SpO2 98%   BMI 26.60 kg/m²     Body mass index is 26.6 kg/m².    Physical Exam   Vitals reviewed.  Constitutional  She appears well-developed and well-nourished.     Eyes  System normal.       Cardiovascular: Normal rate.     Pulmonary/Chest  Effort normal.     Skin  Skin is warm and dry.     Psychiatric  She has a normal mood and affect.     Extremities    Legs:        Legs:      Leg comments: Ecchymosis and edema improving right inner thigh and lower extremity.  We discussed taking frequent breaks to decrease RLE edema, as incision to right inner leg is nearly healed she can " try to wear compression sock to help control edema. Discussed importance of compression and frequent breaks to get out an walk around driving to [place]. She is starting to ambulate better with less limp on the right side. ROM significantly improved. echhymosis and hematoma at medial knee improving.       Cardiovascular: Normal rate    Pulmonary/Chest: Effort normal    Skin: warm and dry   Right upper posterior thigh:  continue to have progress with wound healing, wound measures 4cmx1.5x0.1 cm, wound bed beefy red, scan bleeding with removal of silver alginate. Dry devitalized fibrin at juanito wound removed. Lateral posterior thigh is scabbed and healing well.   Hematoma to inner thigh is much improved, barely palpable, pink scar noted    Right calf:    Medial knee hematoma resolving, the 2 open areas are scabbed and healing. Spitting staple at distal end just above ankle was removed by home health nurse and scabbed. Calf pink and intact.      Result Review :     No new results to review this visit    Assessment and Plan      Diagnoses and all orders for this visit:    1. Dehiscence of operative wound, sequela (Primary)      LIPOSUCTION, Right Lower Extremity Circumferential Liposuction with Skin Resection and negative pressure dressing     Plan:   Continue silver alginate to open wound on posterior thigh  Medi honey, very thin layer to scabbed area on posterior thigh, calf and inner calf  Continue home health for assistance with wound care and supplies, daily dressing change to posterior thigh  RTC 2 weeks  Cont vitamin supplements and protein diet  patient instructed to call office for any questions or concerns and verbalized understanding of all instructions given.        Patient was given instructions and counseling regarding her condition. Please see specific information pulled into the AVS if appropriate.     ANDREI Dave  01/09/2024             Answers submitted by the patient for this visit:  Other  (Submitted on 1/7/2024)  Please describe your symptoms.: Wound care  Have you had these symptoms before?: Yes  How long have you been having these symptoms?: Greater than 2 weeks  Primary Reason for Visit (Submitted on 1/7/2024)  What is the primary reason for your visit?: Other

## 2024-01-02 NOTE — TELEPHONE ENCOUNTER
Medication was discontinued on 02/21/2023 due to it only being a one time use for the cruise she went on

## 2024-01-09 ENCOUNTER — OFFICE VISIT (OUTPATIENT)
Dept: PLASTIC SURGERY | Facility: CLINIC | Age: 44
End: 2024-01-09
Payer: OTHER GOVERNMENT

## 2024-01-09 VITALS
OXYGEN SATURATION: 98 % | BODY MASS INDEX: 26.74 KG/M2 | WEIGHT: 136.2 LBS | DIASTOLIC BLOOD PRESSURE: 72 MMHG | HEIGHT: 60 IN | SYSTOLIC BLOOD PRESSURE: 114 MMHG | HEART RATE: 79 BPM | TEMPERATURE: 98.7 F

## 2024-01-09 DIAGNOSIS — T81.31XS DEHISCENCE OF OPERATIVE WOUND, SEQUELA: Primary | ICD-10-CM

## 2024-01-09 PROCEDURE — 99024 POSTOP FOLLOW-UP VISIT: CPT | Performed by: NURSE PRACTITIONER

## 2024-01-12 NOTE — PROGRESS NOTES
"Chief Complaint  Post-op Follow-up (Dressing change)    Subjective  I don't know if my thigh is swollen or if this is just me      History of Present Illness  Angelique Cordon is a 43 y.o. female who presents to [facility] for post operative follow up LIPOSUCTION, Right Lower Extremity Circumferential Liposuction with Skin Resection and negative pressure dressing on 10/24/23.    Patient comes in today for wound check and dressing change.  Doing well. Has been using vaseline and medi honey. Still has some numbness on upper thigh. Lower calf is still red but their is some improvement. Does have swelling on inner right thigh.    Allergies: Patient has no known allergies.  Allergies Reconciled.    Review of Systems   Constitutional:  Positive for activity change.   HENT: Negative.     Eyes: Negative.    Respiratory: Negative.     Cardiovascular: Negative.    Gastrointestinal: Negative.    Endocrine: Negative.    Genitourinary: Negative.    Musculoskeletal:  Positive for joint swelling and myalgias.   Skin:  Positive for wound and bruise.   Allergic/Immunologic: Negative.    Neurological: Negative.    Hematological: Negative.    Psychiatric/Behavioral: Negative.        All review of system has been reviewed and it  is negative except the ones note above.     Objective   Vitals:    01/23/24 0839   BP: 104/71   Pulse: 89   Temp: 98.9 °F (37.2 °C)   SpO2: 98%         /71 (BP Location: Left arm, Patient Position: Sitting, Cuff Size: Adult)   Pulse 89   Temp 98.9 °F (37.2 °C) (Temporal)   Ht 152.4 cm (60\")   Wt 63.2 kg (139 lb 6.4 oz)   SpO2 98%   BMI 27.22 kg/m²     Body mass index is 27.22 kg/m².    Physical Exam   Vitals reviewed.  Constitutional  She appears well-developed and well-nourished.     Eyes  System normal.       Cardiovascular: Normal rate.     Pulmonary/Chest  Effort normal.     Skin  Skin is warm and dry.     Psychiatric  She has a normal mood and " "affect.     Extremities    Legs:        Legs:      Leg comments: Ecchymosis and edema improving right inner thigh and lower extremity.  We discussed taking frequent breaks to decrease RLE edema, as incision to right inner leg is  healed with purple scarring noted, she can wear compression sock to help control edema. Discussed importance of compression and frequent breaks to get out an walk around driving to [place]. She is ambulating well. ROM continues to improve. ecchymosis and hematoma at medial knee improving, soft tissue swelling at top of right inner thigh and right hip.       Cardiovascular: Normal rate    Pulmonary/Chest: Effort normal    Skin: warm and dry   Right upper posterior thigh:  wound measuring 2.6cm by 1.0cm by 0.1cm with some brownish, yellow drainage, no signs of infection, wound bed beefy red, soft \"boggy\", covered with biofilm and wound edges dry. Silver nitrate applied to wound to stimulate granulation tissue.  Right calf:  healed inner leg incision with purple scarring from open areas, blistered area on right calf pink and healed       Result Review :     No new results to review this visit.    Assessment and Plan      Diagnoses and all orders for this visit:    1. Dehiscence of operative wound, initial encounter (Primary)    2. Postoperative follow-up      Plan:   Photos obtained today.   Trimmed some healed scabs off of inner right leg. Applied medi honey to right upper hip wound after silver nitrate applied as silver alginate not brought in with patient. Continue using alginate silver to upper hip wound, may change daily with showers or cont to shower every other day and change dressing. Continue using compression socks.   Patient is aware to not get into water unless all wounds are fully healed, discussed risk of infection again with patient.  Suggested red light therapy to right calf   RTC in 2 weeks for wound check and dressing change.  Patient instructed to call office for any " questions or concerns and verbalized understanding of all instructions given.      Scribed by Sindi Narvaez MA, acting as a scribe for ANDREI Dave, 01/23/24 09:19 EST.  ANDREI Dave's signature on the note affirms that the note adequately documents the care provided.        Patient was given instructions and counseling regarding her condition. Please see specific information pulled into the AVS if appropriate.     ANDREI Dave  01/23/2024               Answers submitted by the patient for this visit:  Other (Submitted on 1/16/2024)  Please describe your symptoms.: Wound care  Have you had these symptoms before?: Yes  How long have you been having these symptoms?: Greater than 2 weeks  Please list any medications you are currently taking for this condition.: Same  Please describe any probable cause for these symptoms. : Surgery  Primary Reason for Visit (Submitted on 1/16/2024)  What is the primary reason for your visit?: Other

## 2024-01-23 ENCOUNTER — OFFICE VISIT (OUTPATIENT)
Dept: PLASTIC SURGERY | Facility: CLINIC | Age: 44
End: 2024-01-23
Payer: OTHER GOVERNMENT

## 2024-01-23 VITALS
DIASTOLIC BLOOD PRESSURE: 71 MMHG | OXYGEN SATURATION: 98 % | SYSTOLIC BLOOD PRESSURE: 104 MMHG | TEMPERATURE: 98.9 F | HEART RATE: 89 BPM | BODY MASS INDEX: 27.37 KG/M2 | WEIGHT: 139.4 LBS | HEIGHT: 60 IN

## 2024-01-23 DIAGNOSIS — Z09 POSTOPERATIVE FOLLOW-UP: ICD-10-CM

## 2024-01-23 DIAGNOSIS — T81.31XA DEHISCENCE OF OPERATIVE WOUND, INITIAL ENCOUNTER: Primary | ICD-10-CM

## 2024-01-23 PROCEDURE — 99213 OFFICE O/P EST LOW 20 MIN: CPT | Performed by: NURSE PRACTITIONER

## 2024-01-29 ENCOUNTER — TELEPHONE (OUTPATIENT)
Dept: PLASTIC SURGERY | Facility: CLINIC | Age: 44
End: 2024-01-29
Payer: OTHER GOVERNMENT

## 2024-01-29 NOTE — TELEPHONE ENCOUNTER
LVM FOR PATIENT TO CALL AND CHANGE APPOINTMENT ON 02/08/24.    PLEASE TRANSFER TO THE OFFICE WHEN PATIENT CALLS BACK.

## 2024-02-02 ENCOUNTER — TELEPHONE (OUTPATIENT)
Dept: PLASTIC SURGERY | Facility: CLINIC | Age: 44
End: 2024-02-02
Payer: OTHER GOVERNMENT

## 2024-02-02 DIAGNOSIS — T14.8XXA BLISTER: Primary | ICD-10-CM

## 2024-02-07 NOTE — PROGRESS NOTES
Chief Complaint  Follow-up    Subjective  I don't know if my thigh is swollen or if this is just me      History of Present Illness  Angelique Cordon is a 43 y.o. female who presents to [facility] for post operative follow up LIPOSUCTION, Right Lower Extremity Circumferential Liposuction with Skin Resection and negative pressure dressing on 10/24/23.    Patient comes in today for wound check and dressing change.        Allergies: Patient has no known allergies.  Allergies Reconciled.    Review of Systems   Constitutional: Negative.  Negative for activity change.   HENT: Negative.     Eyes: Negative.    Respiratory: Negative.     Cardiovascular: Negative.    Gastrointestinal: Negative.    Endocrine: Negative.    Genitourinary: Negative.    Musculoskeletal:  Negative for joint swelling and myalgias.   Skin:  Positive for color change and dry skin. Negative for bruise.   Allergic/Immunologic: Negative.    Neurological: Negative.    Hematological: Negative.    Psychiatric/Behavioral: Negative.        All review of system has been reviewed and it  is negative except the ones note above.     Objective   Vitals:    02/08/24 0830   BP: 113/74   Pulse: 89   Temp: 96.1 °F (35.6 °C)   SpO2: 97%           Physical Exam  Vitals reviewed. Exam conducted with a chaperone present.   Constitutional:       Appearance: Normal appearance.   HENT:      Head: Normocephalic and atraumatic.      Nose: Nose normal.   Eyes:      Extraocular Movements: Extraocular movements intact.      Conjunctiva/sclera: Conjunctivae normal.   Cardiovascular:      Rate and Rhythm: Normal rate.   Pulmonary:      Effort: Pulmonary effort is normal.   Abdominal:      Palpations: Abdomen is soft.   Musculoskeletal:         General: Normal range of motion.      Cervical back: Normal range of motion.   Skin:     General: Skin is warm and dry.      Capillary Refill: Capillary refill takes less than 2 seconds.              Comments: Right calf and inner leg post op blistering and incision dehiscence is closed, purple and red skin discoloration is improving and fading   Neurological:      Mental Status: She is alert and oriented to person, place, and time.   Psychiatric:         Mood and Affect: Mood normal.             Cardiovascular: Normal rate    Pulmonary/Chest: Effort normal    Skin: warm and dry   Right upper posterior thigh:  wound healed  Right calf:   wound healed    Result Review :      No new results to review this visit.    Assessment and Plan      Diagnoses and all orders for this visit:    1. Dehiscence of operative wound, sequela (Primary)    2. Lipedema        Plan:   Wounds healed, continue scar cream and moisturizing right lower extremity  Able to get in water on [place]  Dr. Willams also evaluated patient this visit, discussed scar revision on right posterior thigh, lipedema on right hip  after planned surgery on left leg  RTC pre op left leg surgery  patient instructed to call office for any questions or concerns and verbalized understanding of all instructions given.      Patient was given instructions and counseling regarding her condition. Please see specific information pulled into the AVS if appropriate.     ANDREI Dave  02/08/2024               Answers submitted by the patient for this visit:  Other (Submitted on 2/6/2024)  Please describe your symptoms.: Wound care  Have you had these symptoms before?: Yes  How long have you been having these symptoms?: Greater than 2 weeks  Primary Reason for Visit (Submitted on 2/6/2024)  What is the primary reason for your visit?: Other

## 2024-02-08 ENCOUNTER — OFFICE VISIT (OUTPATIENT)
Dept: PLASTIC SURGERY | Facility: CLINIC | Age: 44
End: 2024-02-08
Payer: OTHER GOVERNMENT

## 2024-02-08 VITALS
BODY MASS INDEX: 28.58 KG/M2 | WEIGHT: 145.6 LBS | DIASTOLIC BLOOD PRESSURE: 74 MMHG | TEMPERATURE: 96.1 F | OXYGEN SATURATION: 97 % | HEART RATE: 89 BPM | HEIGHT: 60 IN | SYSTOLIC BLOOD PRESSURE: 113 MMHG

## 2024-02-08 DIAGNOSIS — T81.31XS DEHISCENCE OF OPERATIVE WOUND, SEQUELA: Primary | ICD-10-CM

## 2024-02-08 DIAGNOSIS — R60.9 LIPEDEMA: ICD-10-CM

## 2024-03-12 NOTE — PROGRESS NOTES
"Pre-op Exam (Left lower extremity liposuction, skin resection)            History of Present Illness  Angelique Cordon is a 44 y.o. female who presents to [facility] for Pre-Operative Examination for LIPOSUCTION, Left Lower Extremity Circumferential Liposuction with Skin Resection and negative pressure dressing 4/19/24.    Pt presents today for pre op surgery on 4/19/24.    3/20/24 urine nicotine normal  Subjective          Patient has no known allergies.  Allergies Reconciled.    Review of Systems   All review of system has been reviewed and it  is negative except the ones note above.     Objective     /80 (BP Location: Left arm, Patient Position: Sitting, Cuff Size: Adult)   Pulse 80   Temp 98.2 °F (36.8 °C) (Oral)   Ht 152.4 cm (60\")   Wt 67.9 kg (149 lb 12.8 oz)   SpO2 98%   BMI 29.26 kg/m²     Body mass index is 29.26 kg/m².    Physical Exam    Result Review :                Assessment and Plan      Diagnoses and all orders for this visit:    1. Pre-operative examination (Primary)  -     Nicotine Screen, Urine - Urine, Clean Catch; Future  -     cephalexin (KEFLEX) 500 MG capsule; Take 1 capsule by mouth 4 (Four) Times a Day for 5 days.  Dispense: 20 capsule; Refill: 0  -     naproxen (NAPROSYN) 250 MG tablet; Take 1 tablet by mouth 2 (Two) Times a Day.  Dispense: 12 tablet; Refill: 0  -     gabapentin (Neurontin) 300 MG capsule; Take 1 capsule by mouth 3 (Three) Times a Day for 18 doses.  Dispense: 18 capsule; Refill: 0  -     acetaminophen (TYLENOL) 500 MG tablet; Take 2 tablets by mouth Every 6 (Six) Hours As Needed for Moderate Pain for up to 18 doses.  Dispense: 18 tablet; Refill: 0  -     ondansetron (Zofran) 4 MG tablet; Take 1 tablet by mouth Daily As Needed for Nausea or Vomiting for up to 18 doses.  Dispense: 18 tablet; Refill: 0    2. Difficulty walking    3. Pain in both lower extremities    4. Localized edema    5. " Lipedema        Plan:  Given these options, the patient has verbally expressed an understanding of the risks of surgery and finds these risks acceptable. We will proceed with surgery as soon as possible.    Medications sent to pharmacy      Scribed by Summer Egan, acting as a scribe for Raffaele Willams MD, 03/21/24 07:46 EDT.  Raffaele Willams MD's signature on the note affirms that the note adequately documents the care provided.          Follow Up     Return RTC 1 week after surgery.    Patient was given instructions and counseling regarding her condition. Please see specific information pulled into the AVS if appropriate.     Raffaele Willams MD  03/21/2024

## 2024-03-12 NOTE — H&P (VIEW-ONLY)
"Pre-op Exam (Left lower extremity liposuction, skin resection)            History of Present Illness  Angelique Cordon is a 44 y.o. female who presents to [facility] for Pre-Operative Examination for LIPOSUCTION, Left Lower Extremity Circumferential Liposuction with Skin Resection and negative pressure dressing 4/19/24.    Pt presents today for pre op surgery on 4/19/24.    3/20/24 urine nicotine normal  Subjective          Patient has no known allergies.  Allergies Reconciled.    Review of Systems   All review of system has been reviewed and it  is negative except the ones note above.     Objective     /80 (BP Location: Left arm, Patient Position: Sitting, Cuff Size: Adult)   Pulse 80   Temp 98.2 °F (36.8 °C) (Oral)   Ht 152.4 cm (60\")   Wt 67.9 kg (149 lb 12.8 oz)   SpO2 98%   BMI 29.26 kg/m²     Body mass index is 29.26 kg/m².    Physical Exam    Result Review :                Assessment and Plan      Diagnoses and all orders for this visit:    1. Pre-operative examination (Primary)  -     Nicotine Screen, Urine - Urine, Clean Catch; Future  -     cephalexin (KEFLEX) 500 MG capsule; Take 1 capsule by mouth 4 (Four) Times a Day for 5 days.  Dispense: 20 capsule; Refill: 0  -     naproxen (NAPROSYN) 250 MG tablet; Take 1 tablet by mouth 2 (Two) Times a Day.  Dispense: 12 tablet; Refill: 0  -     gabapentin (Neurontin) 300 MG capsule; Take 1 capsule by mouth 3 (Three) Times a Day for 18 doses.  Dispense: 18 capsule; Refill: 0  -     acetaminophen (TYLENOL) 500 MG tablet; Take 2 tablets by mouth Every 6 (Six) Hours As Needed for Moderate Pain for up to 18 doses.  Dispense: 18 tablet; Refill: 0  -     ondansetron (Zofran) 4 MG tablet; Take 1 tablet by mouth Daily As Needed for Nausea or Vomiting for up to 18 doses.  Dispense: 18 tablet; Refill: 0    2. Difficulty walking    3. Pain in both lower extremities    4. Localized edema    5. " Lipedema        Plan:  Given these options, the patient has verbally expressed an understanding of the risks of surgery and finds these risks acceptable. We will proceed with surgery as soon as possible.    Medications sent to pharmacy      Scribed by Summer Egan, acting as a scribe for Raffaele Willams MD, 03/21/24 07:46 EDT.  Raffaele Willams MD's signature on the note affirms that the note adequately documents the care provided.          Follow Up     Return RTC 1 week after surgery.    Patient was given instructions and counseling regarding her condition. Please see specific information pulled into the AVS if appropriate.     Raffaele Willams MD  03/21/2024

## 2024-03-20 ENCOUNTER — LAB (OUTPATIENT)
Dept: LAB | Facility: HOSPITAL | Age: 44
End: 2024-03-20
Payer: OTHER GOVERNMENT

## 2024-03-20 ENCOUNTER — PATIENT MESSAGE (OUTPATIENT)
Dept: FAMILY MEDICINE CLINIC | Facility: CLINIC | Age: 44
End: 2024-03-20
Payer: OTHER GOVERNMENT

## 2024-03-20 DIAGNOSIS — Z01.818 PRE-OPERATIVE EXAMINATION: ICD-10-CM

## 2024-03-20 LAB — COTININE UR-MCNC: NEGATIVE NG/ML

## 2024-03-20 PROCEDURE — G0480 DRUG TEST DEF 1-7 CLASSES: HCPCS

## 2024-03-21 ENCOUNTER — OFFICE VISIT (OUTPATIENT)
Dept: PLASTIC SURGERY | Facility: CLINIC | Age: 44
End: 2024-03-21
Payer: OTHER GOVERNMENT

## 2024-03-21 VITALS
BODY MASS INDEX: 29.41 KG/M2 | OXYGEN SATURATION: 98 % | DIASTOLIC BLOOD PRESSURE: 80 MMHG | WEIGHT: 149.8 LBS | TEMPERATURE: 98.2 F | HEIGHT: 60 IN | HEART RATE: 80 BPM | SYSTOLIC BLOOD PRESSURE: 121 MMHG

## 2024-03-21 DIAGNOSIS — Z01.818 PRE-OPERATIVE EXAMINATION: Primary | ICD-10-CM

## 2024-03-21 DIAGNOSIS — R60.9 LIPEDEMA: ICD-10-CM

## 2024-03-21 DIAGNOSIS — R26.2 DIFFICULTY WALKING: ICD-10-CM

## 2024-03-21 DIAGNOSIS — M79.604 PAIN IN BOTH LOWER EXTREMITIES: ICD-10-CM

## 2024-03-21 DIAGNOSIS — R60.0 LOCALIZED EDEMA: ICD-10-CM

## 2024-03-21 DIAGNOSIS — M79.605 PAIN IN BOTH LOWER EXTREMITIES: ICD-10-CM

## 2024-03-23 RX ORDER — ACETAMINOPHEN 500 MG
1000 TABLET ORAL EVERY 6 HOURS PRN
Qty: 18 TABLET | Refills: 0 | Status: SHIPPED | OUTPATIENT
Start: 2024-03-23

## 2024-03-23 RX ORDER — ONDANSETRON 4 MG/1
4 TABLET, FILM COATED ORAL DAILY PRN
Qty: 18 TABLET | Refills: 0 | Status: SHIPPED | OUTPATIENT
Start: 2024-03-23

## 2024-03-23 RX ORDER — CEPHALEXIN 500 MG/1
500 CAPSULE ORAL 4 TIMES DAILY
Qty: 20 CAPSULE | Refills: 0 | Status: SHIPPED | OUTPATIENT
Start: 2024-03-23 | End: 2024-03-28

## 2024-03-23 RX ORDER — GABAPENTIN 300 MG/1
300 CAPSULE ORAL 3 TIMES DAILY
Qty: 18 CAPSULE | Refills: 0 | Status: SHIPPED | OUTPATIENT
Start: 2024-03-23 | End: 2024-03-29

## 2024-03-23 RX ORDER — NAPROXEN 250 MG/1
250 TABLET ORAL 2 TIMES DAILY
Qty: 12 TABLET | Refills: 0 | Status: SHIPPED | OUTPATIENT
Start: 2024-03-23

## 2024-03-25 ENCOUNTER — TELEMEDICINE (OUTPATIENT)
Dept: FAMILY MEDICINE CLINIC | Facility: CLINIC | Age: 44
End: 2024-03-25
Payer: OTHER GOVERNMENT

## 2024-03-25 VITALS — WEIGHT: 149 LBS | BODY MASS INDEX: 29.25 KG/M2 | HEIGHT: 60 IN

## 2024-03-25 DIAGNOSIS — F41.9 ANXIETY: ICD-10-CM

## 2024-03-25 PROCEDURE — 99213 OFFICE O/P EST LOW 20 MIN: CPT | Performed by: NURSE PRACTITIONER

## 2024-03-25 RX ORDER — VORTIOXETINE 20 MG/1
20 TABLET, FILM COATED ORAL DAILY
Qty: 90 TABLET | Refills: 1 | Status: SHIPPED | OUTPATIENT
Start: 2024-03-25

## 2024-03-25 NOTE — PROGRESS NOTES
Chief Complaint  Anxiety    SUBJECTIVE  Angelique Cordon presents to [facility] for 6 month follow up on anxiety.     You have chosen to receive care through a telehealth visit.  Do you consent to use a video/audio connection for your medical care today? Yes.    Patient being seen via FaceTime, patient is at home, provider is in office    Patient here today to follow-up on anxiety, still doing well with Trintellix.    Patient has additional surgery coming up with plastics, states that she has quit taking her iron supplement is her last check was normal, she has messaged her surgeon about labs prior to surgery.    History of Present Illness  Past Medical History:   Diagnosis Date    Abnormal bone density screening     Anxiety     Cancer     Lipedema of lower extremity     LEGS AND ARMS    Pap smear for cervical cancer screening 2018    Varicose vein of leg     Visit for screening mammogram       Family History   Problem Relation Age of Onset    Diabetes Mother     Sleep apnea Mother     COPD Mother     Hypertension Mother     Colon cancer Father     Malig Hyperthermia Neg Hx       Past Surgical History:   Procedure Laterality Date    EXCISION LESION Left 4/1/2022    Procedure: EXCISION CYST LEFT AXILLARY;  Surgeon: Rosalie Talley MD;  Location: [facility] OR [facility];  Service: General;  Laterality: Left;    FOOT SURGERY Left 2003/2009 HAD 3 DIFFERENT SURGERIES    L FOOT/TENDON RELEASE PLANTAR FASIA    LIPOSUCTION Right 10/24/2023    Procedure: LIPOSUCTION, Right Lower Extremity Circumferential Liposuction with Skin Resection and negative pressure dressing;  Surgeon: Raffaele Willams MD;  Location: [facility] OR [facility];  Service: Plastics;  Laterality: Right;        Current Outpatient Medications:     cephalexin (KEFLEX) 500 MG capsule, Take 1 capsule by mouth 4 (Four) Times a Day for 5 days., Disp: 20 capsule, Rfl: 0    gabapentin (Neurontin) 300 MG capsule, Take 1 capsule by mouth 3  "(Three) Times a Day for 18 doses., Disp: 18 capsule, Rfl: 0    ibuprofen (ADVIL,MOTRIN) 800 MG tablet, TAKE 1 TABLET BY MOUTH EVERY 8 HOURS AS NEEDED FOR MILD PAIN, Disp: 90 tablet, Rfl: 0    Melatonin 10 MG/ML liquid, Take 5 mg by mouth Every Night., Disp: , Rfl:     ondansetron (Zofran) 4 MG tablet, Take 1 tablet by mouth Daily As Needed for Nausea or Vomiting for up to 18 doses., Disp: 18 tablet, Rfl: 0    Vortioxetine HBr (Trintellix) 20 MG tablet, Take 1 tablet by mouth Daily., Disp: 90 tablet, Rfl: 1    acetaminophen (TYLENOL) 500 MG tablet, Take 2 tablets by mouth Every 6 (Six) Hours As Needed for Moderate Pain for up to 18 doses. (Patient not taking: Reported on 3/25/2024), Disp: 18 tablet, Rfl: 0    naproxen (NAPROSYN) 250 MG tablet, Take 1 tablet by mouth 2 (Two) Times a Day. (Patient not taking: Reported on 3/25/2024), Disp: 12 tablet, Rfl: 0    Current Facility-Administered Medications:     silver sulfadiazine (SILVADENE, SSD) 1 % cream 1 Application, 1 Application, Topical, Q12H, Wnedy Salter APRN    OBJECTIVE  Vital Signs:   Ht 152.4 cm (60\")   Wt 67.6 kg (149 lb)   BMI 29.10 kg/m²    Estimated body mass index is 29.1 kg/m² as calculated from the following:    Height as of this encounter: 152.4 cm (60\").    Weight as of this encounter: 67.6 kg (149 lb).     Wt Readings from Last 3 Encounters:   03/25/24 67.6 kg (149 lb)   03/21/24 67.9 kg (149 lb 12.8 oz)   02/08/24 66 kg (145 lb 9.6 oz)     BP Readings from Last 3 Encounters:   03/21/24 121/80   02/08/24 113/74   01/23/24 104/71       Physical Exam  Constitutional:       Appearance: Normal appearance.   Pulmonary:      Effort: Pulmonary effort is normal.   Neurological:      Mental Status: She is alert and oriented to person, place, and time.   Psychiatric:         Mood and Affect: Mood normal.         Behavior: Behavior normal.         Thought Content: Thought content normal.         Judgment: Judgment normal.          Result Review  "   CMP          10/29/2023    13:08   CMP   Glucose 120    BUN 8    Creatinine 0.64    EGFR 112.6    Sodium 138    Potassium 3.7    Chloride 102    Calcium 9.0    Total Protein 6.6    Albumin 3.4    Globulin 3.2    Total Bilirubin 0.3    Alkaline Phosphatase 47    AST (SGOT) 10    ALT (SGPT) 9    Albumin/Globulin Ratio 1.1    BUN/Creatinine Ratio 12.5    Anion Gap 10.2      CBC          10/29/2023    13:08 10/30/2023    01:09 11/28/2023    11:04   CBC   WBC 9.95  9.40  6.19    RBC 2.26  2.68  3.52    Hemoglobin 7.0  8.3  11.3    Hematocrit 21.5  24.7  33.6    MCV 95.1  92.2  95.5    MCH 31.0  31.0  32.1    MCHC 32.6  33.6  33.6    RDW 13.2  13.9  13.3    Platelets 348  268  291            No Images in the past 120 days found..     The above data has been reviewed by ANDREI Olivera 03/25/2024 08:47 EDT.          Patient Care Team:  Hannah Parrish APRN as PCP - General (Nurse Practitioner)  Rosalie Talley MD as Consulting Physician (General Surgery)  Raffaele Willams MD as Consulting Physician (Plastic Surgery)            ASSESSMENT & PLAN    Diagnoses and all orders for this visit:    1. Anxiety  Overview:  Stable and well-controlled with Trintellix, continue current medication    Orders:  -     Vortioxetine HBr (Trintellix) 20 MG tablet; Take 1 tablet by mouth Daily.  Dispense: 90 tablet; Refill: 1         Tobacco Use: Low Risk  (3/25/2024)    Patient History     Smoking Tobacco Use: Never     Smokeless Tobacco Use: Never     Passive Exposure: Never       Follow Up     Return in about 6 months (around 9/25/2024), or if symptoms worsen or fail to improve.        Patient was given instructions and counseling regarding her condition or for health maintenance advice. Please see specific information pulled into the AVS if appropriate.   I have reviewed information obtained and documented by others and I have confirmed the accuracy of this documented note.    ANDREI Olivera

## 2024-04-04 ENCOUNTER — TELEPHONE (OUTPATIENT)
Dept: PLASTIC SURGERY | Facility: CLINIC | Age: 44
End: 2024-04-04

## 2024-04-04 NOTE — TELEPHONE ENCOUNTER
[facility] received a fax that requires your attention. The document has been indexed to the patient’s chart for your review.      Reason for sending:  SX  AUTHORIZATION.     Documents Description:   SX AUTH_ [employer]_ 04-01-24    Name of Sender:  [employer]     Date Indexed: 04/04/24    Notes (if needed):

## 2024-04-10 DIAGNOSIS — D50.9 IRON DEFICIENCY ANEMIA, UNSPECIFIED IRON DEFICIENCY ANEMIA TYPE: Primary | ICD-10-CM

## 2024-04-12 ENCOUNTER — PRE-ADMISSION TESTING (OUTPATIENT)
Dept: PREADMISSION TESTING | Facility: HOSPITAL | Age: 44
End: 2024-04-12
Payer: OTHER GOVERNMENT

## 2024-04-12 VITALS
HEART RATE: 80 BPM | RESPIRATION RATE: 20 BRPM | TEMPERATURE: 98 F | BODY MASS INDEX: 30.04 KG/M2 | DIASTOLIC BLOOD PRESSURE: 80 MMHG | OXYGEN SATURATION: 99 % | HEIGHT: 60 IN | WEIGHT: 153 LBS | SYSTOLIC BLOOD PRESSURE: 112 MMHG

## 2024-04-12 DIAGNOSIS — D50.9 IRON DEFICIENCY ANEMIA, UNSPECIFIED IRON DEFICIENCY ANEMIA TYPE: ICD-10-CM

## 2024-04-12 LAB
BASOPHILS # BLD AUTO: 0.04 10*3/MM3 (ref 0–0.2)
BASOPHILS NFR BLD AUTO: 0.5 % (ref 0–1.5)
DEPRECATED RDW RBC AUTO: 44.9 FL (ref 37–54)
EOSINOPHIL # BLD AUTO: 0.11 10*3/MM3 (ref 0–0.4)
EOSINOPHIL NFR BLD AUTO: 1.5 % (ref 0.3–6.2)
ERYTHROCYTE [DISTWIDTH] IN BLOOD BY AUTOMATED COUNT: 12.9 % (ref 12.3–15.4)
HCT VFR BLD AUTO: 42.2 % (ref 34–46.6)
HGB BLD-MCNC: 13.4 G/DL (ref 12–15.9)
IMM GRANULOCYTES # BLD AUTO: 0.02 10*3/MM3 (ref 0–0.05)
IMM GRANULOCYTES NFR BLD AUTO: 0.3 % (ref 0–0.5)
LYMPHOCYTES # BLD AUTO: 1.38 10*3/MM3 (ref 0.7–3.1)
LYMPHOCYTES NFR BLD AUTO: 18.7 % (ref 19.6–45.3)
MCH RBC QN AUTO: 30.1 PG (ref 26.6–33)
MCHC RBC AUTO-ENTMCNC: 31.8 G/DL (ref 31.5–35.7)
MCV RBC AUTO: 94.8 FL (ref 79–97)
MONOCYTES # BLD AUTO: 0.36 10*3/MM3 (ref 0.1–0.9)
MONOCYTES NFR BLD AUTO: 4.9 % (ref 5–12)
NEUTROPHILS NFR BLD AUTO: 5.48 10*3/MM3 (ref 1.7–7)
NEUTROPHILS NFR BLD AUTO: 74.1 % (ref 42.7–76)
NRBC BLD AUTO-RTO: 0 /100 WBC (ref 0–0.2)
PLATELET # BLD AUTO: 203 10*3/MM3 (ref 140–450)
PMV BLD AUTO: 11.6 FL (ref 6–12)
RBC # BLD AUTO: 4.45 10*6/MM3 (ref 3.77–5.28)
WBC NRBC COR # BLD AUTO: 7.39 10*3/MM3 (ref 3.4–10.8)

## 2024-04-12 PROCEDURE — 36415 COLL VENOUS BLD VENIPUNCTURE: CPT

## 2024-04-12 PROCEDURE — 85025 COMPLETE CBC W/AUTO DIFF WBC: CPT

## 2024-04-12 NOTE — DISCHARGE INSTRUCTIONS
IMPORTANT INSTRUCTIONS - PRE-ADMISSION TESTING  DO NOT EAT OR CHEW anything after midnight the night before your procedure.    You may have CLEAR liquids up to ___2___ hours prior to ARRIVAL time.   Take the following medications the morning of your procedure with JUST A SIP OF WATER:  __Tylenol, Trintellix ____DO NOT BRING your medications to the hospital with you, UNLESS something has changed since your PRE-Admission Testing appointment.  Hold all vitamins, supplements, and NSAIDS (Non- steroidal anti-inflammatory meds) for one week prior to surgery (you MAY take Tylenol or Acetaminophen).    Use your inhalers/nebulizers as usual, the morning of your procedure. BRING YOUR INHALERS with you.   Bring your CPAP or BIPAP to hospital, ONLY IF YOU WILL BE SPENDING THE NIGHT.   Make sure you have a ride home and have someone who will stay with you the day of your procedure after you go home.  If you have any questions, please call your Pre-Admission Testing Nurse, Marty_ at [phone]_.   Per anesthesia request, do not smoke for 24 hours before your procedure or as instructed by your surgeon.    Clear Liquid Diet        Find out when you need to start a clear liquid diet.   Think of “clear liquids” as anything you could read a newspaper through. This includes things like water, broth, sports drinks, or tea WITHOUT any kind of milk or cream.           Once you are told to start a clear liquid diet, only drink these things until 2 hours before arrival to the hospital or when the hospital says to stop. Total volume limitation: 8 oz.       Clear liquids you CAN drink:   Water   Clear broth: beef, chicken, vegetable, or bone broth with nothing in it   Gatorade   Lemonade or Mj-aid   Soda   Tea, coffee (NO cream or honey)   Jell-O (without fruit)   Popsicles (without fruit or cream)   Italian ices   Juice without pulp: apple, white, grape   You may use salt, pepper, and sugar    Do NOT drink:   Milk or cream   Soy  milk, almond milk, coconut milk, or other non-dairy drinks and   creamers   Milkshakes or smoothies   Tomato juice   Orange juice   Grapefruit juice   Cream soups or any other than broth         Clear Liquid Diet:  Do NOT eat any solid food.  Do NOT eat or suck on mints or candy.  Do NOT chew gum.  Do NOT drink thick liquids like milk or juice with pulp in it.  Do NOT add milk, cream, or anything like soy milk or almond milk to coffee or tea.     PREOPERATIVE (BEFORE SURGERY)              BATHING INSTRUCTIONS  Instructions:    You will need to shower 1 separate times utilizing the soap provided; at the times indicated   below:    Wash your hair and face with normal shampoo and soap, rinse it well before using the surgical soap.      In the shower, wet the skin completely with water from your neck to your feet. Apply the cleanser to your   body ONLY FROM THE NECK TO YOUR FEET.     Do NOT USE THE CLEANSER ON YOUR FACE, HEAD, OR GENITAL (PRIVATE) AREAS.   Keep it out of your eyes, ears, and mouth because of the risk of injury to those areas.      Scrub with a clean washcloth for each bath utilizing the soap provided from the top of your body to the   bottom starting at the neck area.      Pay close attention to your armpits, groin area, and the site of surgery.      Wash your body gently for 5 minutes. Stand outside the stream or turn off the water while scrubbing your   body. Do NOT wash with your regular soap after the surgical cleanser is used.      RINSE THE CLEANSER OFF COMPLETELY with plenty of water. Rinse the area again thoroughly.      Dry off with a clean towel. The surgical soap can cause dryness; however do NOT APPLY LOTION,   CREAM, POWDER, and/or DEODORANT AFTER SHOWERING.     Be sure to where clean clothes after showering.      Ensure CLEAN BED LINENS AFTER FIRST wash with the surgical soap.      NO PETS ALLOWED IN THE BED with you after utilizing the surgical soap.

## 2024-04-18 ENCOUNTER — TELEPHONE (OUTPATIENT)
Dept: PLASTIC SURGERY | Facility: CLINIC | Age: 44
End: 2024-04-18

## 2024-04-18 NOTE — TELEPHONE ENCOUNTER
Caller: RENETTA BECKER    Relationship: SELF    Best call back number: [phone]    What is the best time to reach you: ANY    Who are you requesting to speak with (clinical staff, provider,  specific staff member): CLINICAL    Do you know the name of the person who called: NA    What was the call regarding: PT CALLED AND ASKED HOW LONG SHE WILL BE IN THE HOSPITAL AFTER HER PROCEDURE ON 4-19-24. PT THOUGH SHE WOULD BE IN FOR 5 DAYS     PT MARIO FOR LIPOSUCTION, Left Lower Extremity Circumferential Liposuction with Skin Resection and negative pressure dressing 4-19-24    UNDER ADMISSION TAB IT SAYS SURGERY ADMIT      Is it okay if the provider responds through MyChart: NO    PER CRYSTAL SEND TE TO CLINICAL    PLEASE CALL PT AND ADVISE

## 2024-04-19 ENCOUNTER — ANESTHESIA EVENT (OUTPATIENT)
Dept: PERIOP | Facility: HOSPITAL | Age: 44
End: 2024-04-19
Payer: OTHER GOVERNMENT

## 2024-04-19 ENCOUNTER — HOSPITAL ENCOUNTER (INPATIENT)
Facility: HOSPITAL | Age: 44
LOS: 6 days | Discharge: HOME | End: 2024-04-26
Attending: SURGERY | Admitting: SURGERY
Payer: OTHER GOVERNMENT

## 2024-04-19 ENCOUNTER — ANESTHESIA (OUTPATIENT)
Dept: PERIOP | Facility: HOSPITAL | Age: 44
End: 2024-04-19
Payer: OTHER GOVERNMENT

## 2024-04-19 DIAGNOSIS — R60.9 LIPEDEMA: ICD-10-CM

## 2024-04-19 DIAGNOSIS — R26.2 DIFFICULTY WALKING: Primary | ICD-10-CM

## 2024-04-19 LAB
B-HCG UR QL: NEGATIVE
COTININE UR-MCNC: NEGATIVE NG/ML

## 2024-04-19 PROCEDURE — 0HBLXZZ EXCISION OF LEFT LOWER LEG SKIN, EXTERNAL APPROACH: ICD-10-PCS | Performed by: SURGERY

## 2024-04-19 PROCEDURE — G0480 DRUG TEST DEF 1-7 CLASSES: HCPCS | Performed by: SURGERY

## 2024-04-19 PROCEDURE — 25810000003 LACTATED RINGERS PER 1000 ML: Performed by: ANESTHESIOLOGY

## 2024-04-19 PROCEDURE — 15879 SUCTION LIPECTOMY LWR EXTREM: CPT | Performed by: SURGERY

## 2024-04-19 PROCEDURE — 25010000002 CEFAZOLIN PER 500 MG: Performed by: SURGERY

## 2024-04-19 PROCEDURE — 25010000002 SUGAMMADEX 200 MG/2ML SOLUTION

## 2024-04-19 PROCEDURE — 25010000002 MIDAZOLAM PER 1MG: Performed by: ANESTHESIOLOGY

## 2024-04-19 PROCEDURE — 25010000002 PROPOFOL 10 MG/ML EMULSION: Performed by: NURSE ANESTHETIST, CERTIFIED REGISTERED

## 2024-04-19 PROCEDURE — 25010000002 EPINEPHRINE (ANAPHYLAXIS) 1 MG/ML SOLUTION: Performed by: SURGERY

## 2024-04-19 PROCEDURE — 0J0P3ZZ ALTERATION OF LEFT LOWER LEG SUBCUTANEOUS TISSUE AND FASCIA, PERCUTANEOUS APPROACH: ICD-10-PCS | Performed by: SURGERY

## 2024-04-19 PROCEDURE — 97606 NEG PRS WND THER DME>50 SQCM: CPT | Performed by: SURGERY

## 2024-04-19 PROCEDURE — 25010000002 HYDROMORPHONE 1 MG/ML SOLUTION: Performed by: NURSE ANESTHETIST, CERTIFIED REGISTERED

## 2024-04-19 PROCEDURE — 25010000002 ONDANSETRON PER 1 MG: Performed by: NURSE ANESTHETIST, CERTIFIED REGISTERED

## 2024-04-19 PROCEDURE — 25010000002 ESMOLOL 100 MG/10ML SOLUTION: Performed by: NURSE ANESTHETIST, CERTIFIED REGISTERED

## 2024-04-19 PROCEDURE — 25010000002 FENTANYL CITRATE (PF) 50 MCG/ML SOLUTION: Performed by: NURSE ANESTHETIST, CERTIFIED REGISTERED

## 2024-04-19 PROCEDURE — 81025 URINE PREGNANCY TEST: CPT | Performed by: SURGERY

## 2024-04-19 PROCEDURE — 15832 EXC EXCESSIVE SKIN THIGH: CPT | Performed by: SURGERY

## 2024-04-19 PROCEDURE — 25010000002 DEXAMETHASONE PER 1 MG: Performed by: NURSE ANESTHETIST, CERTIFIED REGISTERED

## 2024-04-19 PROCEDURE — 25010000002 HYDROMORPHONE 1 MG/ML SOLUTION: Performed by: SURGERY

## 2024-04-19 PROCEDURE — 25010000002 ONDANSETRON PER 1 MG: Performed by: SURGERY

## 2024-04-19 PROCEDURE — 2W1MX6Z COMPRESSION OF LEFT LOWER EXTREMITY USING PRESSURE DRESSING: ICD-10-PCS | Performed by: SURGERY

## 2024-04-19 RX ORDER — PROMETHAZINE HYDROCHLORIDE 25 MG/1
25 SUPPOSITORY RECTAL ONCE AS NEEDED
Status: DISCONTINUED | OUTPATIENT
Start: 2024-04-19 | End: 2024-04-19

## 2024-04-19 RX ORDER — MIDAZOLAM HYDROCHLORIDE 2 MG/2ML
2 INJECTION, SOLUTION INTRAMUSCULAR; INTRAVENOUS ONCE
Status: COMPLETED | OUTPATIENT
Start: 2024-04-19 | End: 2024-04-19

## 2024-04-19 RX ORDER — BUPIVACAINE HCL/0.9 % NACL/PF 0.1 %
2000 PLASTIC BAG, INJECTION (ML) EPIDURAL
Qty: 100 ML | Refills: 0 | Status: COMPLETED | OUTPATIENT
Start: 2024-04-19 | End: 2024-04-19

## 2024-04-19 RX ORDER — PROPOFOL 10 MG/ML
VIAL (ML) INTRAVENOUS AS NEEDED
Status: DISCONTINUED | OUTPATIENT
Start: 2024-04-19 | End: 2024-04-19 | Stop reason: SURG

## 2024-04-19 RX ORDER — OXYCODONE HYDROCHLORIDE 5 MG/1
5 TABLET ORAL
Status: COMPLETED | OUTPATIENT
Start: 2024-04-19 | End: 2024-04-19

## 2024-04-19 RX ORDER — PROMETHAZINE HYDROCHLORIDE 12.5 MG/1
25 TABLET ORAL ONCE AS NEEDED
Status: DISCONTINUED | OUTPATIENT
Start: 2024-04-19 | End: 2024-04-19

## 2024-04-19 RX ORDER — ONDANSETRON 4 MG/1
4 TABLET, ORALLY DISINTEGRATING ORAL EVERY 6 HOURS PRN
Status: DISCONTINUED | OUTPATIENT
Start: 2024-04-19 | End: 2024-04-26 | Stop reason: HOSPADM

## 2024-04-19 RX ORDER — NALOXONE HCL 0.4 MG/ML
0.1 VIAL (ML) INJECTION
Status: DISCONTINUED | OUTPATIENT
Start: 2024-04-19 | End: 2024-04-26 | Stop reason: HOSPADM

## 2024-04-19 RX ORDER — PHENYLEPHRINE HCL IN 0.9% NACL 1 MG/10 ML
SYRINGE (ML) INTRAVENOUS AS NEEDED
Status: DISCONTINUED | OUTPATIENT
Start: 2024-04-19 | End: 2024-04-19 | Stop reason: SURG

## 2024-04-19 RX ORDER — ONDANSETRON 2 MG/ML
INJECTION INTRAMUSCULAR; INTRAVENOUS AS NEEDED
Status: DISCONTINUED | OUTPATIENT
Start: 2024-04-19 | End: 2024-04-19 | Stop reason: SURG

## 2024-04-19 RX ORDER — AMOXICILLIN 250 MG
2 CAPSULE ORAL 2 TIMES DAILY PRN
Status: DISCONTINUED | OUTPATIENT
Start: 2024-04-19 | End: 2024-04-26 | Stop reason: HOSPADM

## 2024-04-19 RX ORDER — ACETAMINOPHEN 650 MG/1
650 SUPPOSITORY RECTAL EVERY 4 HOURS PRN
Status: DISCONTINUED | OUTPATIENT
Start: 2024-04-19 | End: 2024-04-26 | Stop reason: HOSPADM

## 2024-04-19 RX ORDER — OXYCODONE HYDROCHLORIDE AND ACETAMINOPHEN 5; 325 MG/1; MG/1
1 TABLET ORAL EVERY 4 HOURS PRN
Status: DISPENSED | OUTPATIENT
Start: 2024-04-19 | End: 2024-04-24

## 2024-04-19 RX ORDER — NAPROXEN 250 MG/1
250 TABLET ORAL 2 TIMES DAILY
Status: DISCONTINUED | OUTPATIENT
Start: 2024-04-19 | End: 2024-04-26 | Stop reason: HOSPADM

## 2024-04-19 RX ORDER — ACETAMINOPHEN 325 MG/1
650 TABLET ORAL EVERY 4 HOURS PRN
Status: DISCONTINUED | OUTPATIENT
Start: 2024-04-19 | End: 2024-04-26 | Stop reason: HOSPADM

## 2024-04-19 RX ORDER — SODIUM CHLORIDE, SODIUM LACTATE, POTASSIUM CHLORIDE, CALCIUM CHLORIDE 600; 310; 30; 20 MG/100ML; MG/100ML; MG/100ML; MG/100ML
9 INJECTION, SOLUTION INTRAVENOUS CONTINUOUS PRN
Status: DISCONTINUED | OUTPATIENT
Start: 2024-04-19 | End: 2024-04-19 | Stop reason: HOSPADM

## 2024-04-19 RX ORDER — EPINEPHRINE 1 MG/ML
INJECTION, SOLUTION INTRAMUSCULAR; SUBCUTANEOUS AS NEEDED
Status: DISCONTINUED | OUTPATIENT
Start: 2024-04-19 | End: 2024-04-19 | Stop reason: HOSPADM

## 2024-04-19 RX ORDER — FENTANYL CITRATE 50 UG/ML
INJECTION, SOLUTION INTRAMUSCULAR; INTRAVENOUS AS NEEDED
Status: DISCONTINUED | OUTPATIENT
Start: 2024-04-19 | End: 2024-04-19 | Stop reason: SURG

## 2024-04-19 RX ORDER — ACETAMINOPHEN 500 MG
1000 TABLET ORAL ONCE
Status: COMPLETED | OUTPATIENT
Start: 2024-04-19 | End: 2024-04-19

## 2024-04-19 RX ORDER — ONDANSETRON 2 MG/ML
4 INJECTION INTRAMUSCULAR; INTRAVENOUS EVERY 6 HOURS PRN
Status: DISCONTINUED | OUTPATIENT
Start: 2024-04-19 | End: 2024-04-26 | Stop reason: HOSPADM

## 2024-04-19 RX ORDER — BUPIVACAINE HCL/0.9 % NACL/PF 0.1 %
2000 PLASTIC BAG, INJECTION (ML) EPIDURAL EVERY 8 HOURS
Qty: 200 ML | Refills: 0 | Status: COMPLETED | OUTPATIENT
Start: 2024-04-20 | End: 2024-04-20

## 2024-04-19 RX ORDER — DEXMEDETOMIDINE HYDROCHLORIDE 100 UG/ML
INJECTION, SOLUTION INTRAVENOUS AS NEEDED
Status: DISCONTINUED | OUTPATIENT
Start: 2024-04-19 | End: 2024-04-19 | Stop reason: SURG

## 2024-04-19 RX ORDER — ROCURONIUM BROMIDE 10 MG/ML
INJECTION, SOLUTION INTRAVENOUS AS NEEDED
Status: DISCONTINUED | OUTPATIENT
Start: 2024-04-19 | End: 2024-04-19 | Stop reason: SURG

## 2024-04-19 RX ORDER — DEXAMETHASONE SODIUM PHOSPHATE 4 MG/ML
INJECTION, SOLUTION INTRA-ARTICULAR; INTRALESIONAL; INTRAMUSCULAR; INTRAVENOUS; SOFT TISSUE AS NEEDED
Status: DISCONTINUED | OUTPATIENT
Start: 2024-04-19 | End: 2024-04-19 | Stop reason: SURG

## 2024-04-19 RX ORDER — ACETAMINOPHEN 500 MG
1000 TABLET ORAL EVERY 6 HOURS PRN
Status: DISCONTINUED | OUTPATIENT
Start: 2024-04-19 | End: 2024-04-26 | Stop reason: HOSPADM

## 2024-04-19 RX ORDER — ESMOLOL HYDROCHLORIDE 10 MG/ML
INJECTION INTRAVENOUS AS NEEDED
Status: DISCONTINUED | OUTPATIENT
Start: 2024-04-19 | End: 2024-04-19 | Stop reason: SURG

## 2024-04-19 RX ORDER — SODIUM CHLORIDE, SODIUM LACTATE, POTASSIUM CHLORIDE, AND CALCIUM CHLORIDE .6; .31; .03; .02 G/100ML; G/100ML; G/100ML; G/100ML
INJECTION, SOLUTION INTRAVENOUS AS NEEDED
Status: DISCONTINUED | OUTPATIENT
Start: 2024-04-19 | End: 2024-04-19 | Stop reason: HOSPADM

## 2024-04-19 RX ORDER — LIDOCAINE HYDROCHLORIDE 20 MG/ML
INJECTION, SOLUTION EPIDURAL; INFILTRATION; INTRACAUDAL; PERINEURAL AS NEEDED
Status: DISCONTINUED | OUTPATIENT
Start: 2024-04-19 | End: 2024-04-19 | Stop reason: SURG

## 2024-04-19 RX ORDER — ONDANSETRON 2 MG/ML
4 INJECTION INTRAMUSCULAR; INTRAVENOUS ONCE AS NEEDED
Status: DISCONTINUED | OUTPATIENT
Start: 2024-04-19 | End: 2024-04-19

## 2024-04-19 RX ORDER — DEXTROSE AND SODIUM CHLORIDE 5; .45 G/100ML; G/100ML
125 INJECTION, SOLUTION INTRAVENOUS CONTINUOUS
Status: DISCONTINUED | OUTPATIENT
Start: 2024-04-19 | End: 2024-04-23

## 2024-04-19 RX ORDER — IBUPROFEN 800 MG/1
800 TABLET ORAL EVERY 8 HOURS PRN
Status: DISCONTINUED | OUTPATIENT
Start: 2024-04-19 | End: 2024-04-19

## 2024-04-19 RX ORDER — ONDANSETRON 4 MG/1
4 TABLET, ORALLY DISINTEGRATING ORAL EVERY 8 HOURS PRN
Status: DISCONTINUED | OUTPATIENT
Start: 2024-04-19 | End: 2024-04-26 | Stop reason: HOSPADM

## 2024-04-19 RX ORDER — MEPERIDINE HYDROCHLORIDE 25 MG/ML
12.5 INJECTION INTRAMUSCULAR; INTRAVENOUS; SUBCUTANEOUS
Status: DISCONTINUED | OUTPATIENT
Start: 2024-04-19 | End: 2024-04-19

## 2024-04-19 RX ADMIN — FENTANYL CITRATE 50 MCG: 50 INJECTION, SOLUTION INTRAMUSCULAR; INTRAVENOUS at 11:51

## 2024-04-19 RX ADMIN — OXYCODONE HYDROCHLORIDE 5 MG: 5 TABLET ORAL at 17:42

## 2024-04-19 RX ADMIN — Medication 100 MCG: at 12:57

## 2024-04-19 RX ADMIN — SENNOSIDES AND DOCUSATE SODIUM 2 TABLET: 50; 8.6 TABLET ORAL at 22:06

## 2024-04-19 RX ADMIN — Medication 100 MCG: at 16:56

## 2024-04-19 RX ADMIN — ESMOLOL HYDROCHLORIDE 10 MG: 100 INJECTION, SOLUTION INTRAVENOUS at 12:29

## 2024-04-19 RX ADMIN — ROCURONIUM BROMIDE 10 MG: 10 INJECTION, SOLUTION INTRAVENOUS at 14:06

## 2024-04-19 RX ADMIN — DEXMEDETOMIDINE 6 MCG: 100 INJECTION, SOLUTION INTRAVENOUS at 13:46

## 2024-04-19 RX ADMIN — PROPOFOL 50 MG: 10 INJECTION, EMULSION INTRAVENOUS at 12:22

## 2024-04-19 RX ADMIN — PROPOFOL 150 MG: 10 INJECTION, EMULSION INTRAVENOUS at 11:53

## 2024-04-19 RX ADMIN — HYDROMORPHONE HYDROCHLORIDE 0.5 MG: 1 INJECTION, SOLUTION INTRAMUSCULAR; INTRAVENOUS; SUBCUTANEOUS at 13:41

## 2024-04-19 RX ADMIN — DEXMEDETOMIDINE 12 MCG: 100 INJECTION, SOLUTION INTRAVENOUS at 12:22

## 2024-04-19 RX ADMIN — ROCURONIUM BROMIDE 10 MG: 10 INJECTION, SOLUTION INTRAVENOUS at 15:37

## 2024-04-19 RX ADMIN — Medication 100 MCG: at 15:00

## 2024-04-19 RX ADMIN — SODIUM CHLORIDE, POTASSIUM CHLORIDE, SODIUM LACTATE AND CALCIUM CHLORIDE 9 ML/HR: 600; 310; 30; 20 INJECTION, SOLUTION INTRAVENOUS at 18:00

## 2024-04-19 RX ADMIN — DEXTROSE AND SODIUM CHLORIDE 125 ML/HR: 5; 450 INJECTION, SOLUTION INTRAVENOUS at 18:40

## 2024-04-19 RX ADMIN — SODIUM CHLORIDE, POTASSIUM CHLORIDE, SODIUM LACTATE AND CALCIUM CHLORIDE 9 ML/HR: 600; 310; 30; 20 INJECTION, SOLUTION INTRAVENOUS at 11:19

## 2024-04-19 RX ADMIN — HYDROMORPHONE HYDROCHLORIDE 0.5 MG: 1 INJECTION, SOLUTION INTRAMUSCULAR; INTRAVENOUS; SUBCUTANEOUS at 16:36

## 2024-04-19 RX ADMIN — ROCURONIUM BROMIDE 20 MG: 10 INJECTION, SOLUTION INTRAVENOUS at 12:45

## 2024-04-19 RX ADMIN — ROCURONIUM BROMIDE 40 MG: 10 INJECTION, SOLUTION INTRAVENOUS at 11:53

## 2024-04-19 RX ADMIN — ROCURONIUM BROMIDE 10 MG: 10 INJECTION, SOLUTION INTRAVENOUS at 12:19

## 2024-04-19 RX ADMIN — DEXMEDETOMIDINE 4 MCG: 100 INJECTION, SOLUTION INTRAVENOUS at 12:12

## 2024-04-19 RX ADMIN — HYDROMORPHONE HYDROCHLORIDE 0.5 MG: 1 INJECTION, SOLUTION INTRAMUSCULAR; INTRAVENOUS; SUBCUTANEOUS at 18:32

## 2024-04-19 RX ADMIN — ESMOLOL HYDROCHLORIDE 10 MG: 100 INJECTION, SOLUTION INTRAVENOUS at 12:33

## 2024-04-19 RX ADMIN — HYDROMORPHONE HYDROCHLORIDE 0.5 MG: 1 INJECTION, SOLUTION INTRAMUSCULAR; INTRAVENOUS; SUBCUTANEOUS at 22:06

## 2024-04-19 RX ADMIN — Medication 100 MCG: at 13:24

## 2024-04-19 RX ADMIN — ROCURONIUM BROMIDE 20 MG: 10 INJECTION, SOLUTION INTRAVENOUS at 14:52

## 2024-04-19 RX ADMIN — ACETAMINOPHEN 1000 MG: 500 TABLET ORAL at 11:19

## 2024-04-19 RX ADMIN — Medication 2000 MG: at 11:56

## 2024-04-19 RX ADMIN — DEXMEDETOMIDINE 4 MCG: 100 INJECTION, SOLUTION INTRAVENOUS at 12:28

## 2024-04-19 RX ADMIN — DEXMEDETOMIDINE 4 MCG: 100 INJECTION, SOLUTION INTRAVENOUS at 12:42

## 2024-04-19 RX ADMIN — HYDROMORPHONE HYDROCHLORIDE 0.5 MG: 1 INJECTION, SOLUTION INTRAMUSCULAR; INTRAVENOUS; SUBCUTANEOUS at 17:02

## 2024-04-19 RX ADMIN — ESMOLOL HYDROCHLORIDE 10 MG: 100 INJECTION, SOLUTION INTRAVENOUS at 12:43

## 2024-04-19 RX ADMIN — SUGAMMADEX 200 MG: 100 INJECTION, SOLUTION INTRAVENOUS at 16:55

## 2024-04-19 RX ADMIN — Medication 100 MCG: at 14:26

## 2024-04-19 RX ADMIN — FENTANYL CITRATE 50 MCG: 50 INJECTION, SOLUTION INTRAMUSCULAR; INTRAVENOUS at 12:07

## 2024-04-19 RX ADMIN — PROPOFOL 50 MG: 10 INJECTION, EMULSION INTRAVENOUS at 16:33

## 2024-04-19 RX ADMIN — OXYCODONE HYDROCHLORIDE 5 MG: 5 TABLET ORAL at 17:23

## 2024-04-19 RX ADMIN — MIDAZOLAM HYDROCHLORIDE 2 MG: 1 INJECTION, SOLUTION INTRAMUSCULAR; INTRAVENOUS at 11:41

## 2024-04-19 RX ADMIN — Medication 100 MCG: at 15:26

## 2024-04-19 RX ADMIN — HYDROMORPHONE HYDROCHLORIDE 0.5 MG: 1 INJECTION, SOLUTION INTRAMUSCULAR; INTRAVENOUS; SUBCUTANEOUS at 13:01

## 2024-04-19 RX ADMIN — HYDROMORPHONE HYDROCHLORIDE 0.5 MG: 1 INJECTION, SOLUTION INTRAMUSCULAR; INTRAVENOUS; SUBCUTANEOUS at 12:19

## 2024-04-19 RX ADMIN — ONDANSETRON HYDROCHLORIDE 4 MG: 2 SOLUTION INTRAMUSCULAR; INTRAVENOUS at 13:03

## 2024-04-19 RX ADMIN — Medication 2000 MG: at 15:56

## 2024-04-19 RX ADMIN — HYDROMORPHONE HYDROCHLORIDE 0.5 MG: 1 INJECTION, SOLUTION INTRAMUSCULAR; INTRAVENOUS; SUBCUTANEOUS at 17:43

## 2024-04-19 RX ADMIN — ROCURONIUM BROMIDE 10 MG: 10 INJECTION, SOLUTION INTRAVENOUS at 13:25

## 2024-04-19 RX ADMIN — NAPROXEN 250 MG: 250 TABLET ORAL at 20:00

## 2024-04-19 RX ADMIN — ROCURONIUM BROMIDE 10 MG: 10 INJECTION, SOLUTION INTRAVENOUS at 14:28

## 2024-04-19 RX ADMIN — ONDANSETRON 4 MG: 2 INJECTION INTRAMUSCULAR; INTRAVENOUS at 18:31

## 2024-04-19 RX ADMIN — LIDOCAINE HYDROCHLORIDE 80 MG: 20 INJECTION, SOLUTION INTRAVENOUS at 11:51

## 2024-04-19 RX ADMIN — DEXAMETHASONE SODIUM PHOSPHATE 4 MG: 4 INJECTION, SOLUTION INTRAMUSCULAR; INTRAVENOUS at 11:56

## 2024-04-19 NOTE — TELEPHONE ENCOUNTER
I called patient and received VM. I left a message, that I was not sure how long she will be in the hospital as I am not sure what she and  discussed at her pre op

## 2024-04-19 NOTE — ANESTHESIA PREPROCEDURE EVALUATION
Anesthesia Evaluation     Patient summary reviewed and Nursing notes reviewed   no history of anesthetic complications:   NPO Solid Status: > 8 hours  NPO Liquid Status: > 2 hours           Airway   Mallampati: I  TM distance: >3 FB  Neck ROM: full  Dental - normal exam     Pulmonary - negative pulmonary ROS and normal exam   Cardiovascular - negative cardio ROS and normal exam  Exercise tolerance: good (4-7 METS)        Neuro/Psych  (+) psychiatric history Anxiety and Depression  GI/Hepatic/Renal/Endo - negative ROS     Musculoskeletal (-) negative ROS    Abdominal  - normal exam   Substance History - negative use     OB/GYN negative ob/gyn ROS         Other - negative ROS       ROS/Med Hx Other: PAT Nursing Notes unavailable.                     Anesthesia Plan    ASA 1     general     intravenous induction     Anesthetic plan, risks, benefits, and alternatives have been provided, discussed and informed consent has been obtained with: patient.    Plan discussed with CRNA.        CODE STATUS:

## 2024-04-19 NOTE — TELEPHONE ENCOUNTER
Can you please reach out to this patient and let her know how long she will be in the hospital. Thank you!

## 2024-04-19 NOTE — OP NOTE
Plastic Surgery Op Note    LEFT LOWER EXTREMITY LIPOSUCTION WITH SKIN EXCISION , PLACEMENT OF WOUND VAC.     Angelique Cordon  4/19/2024    Pre-op Diagnosis:   Difficulty walking [R26.2]  Pain in both lower extremities [M79.604, M79.605]  Localized edema [R60.0]  Lipedema [R60.9]    Post-Op Diagnosis Codes:     * Difficulty walking [R26.2]     * Pain in both lower extremities [M79.604, M79.605]     * Localized edema [R60.0]     * Lipedema [R60.9]    Anesthesia: General    Staff:   Circulator: Saida Briceño RN  Scrub Person: Chong Abrams; Ruslan Munoz; Antonette Orr; Dereje Brito  Assistant: Patricia Ventura RN CSA    Surgeon: Dr Willams  First Assistant: ADAN Meneses who was instrumental in helping with hemostasis, visualization and retraction structures as well as surgical closure.  Her skilled assistance was necessary for the success of this case.      Estimated Blood Loss: 600 mL    Specimens:   Order Name Source Comment Collection Info Order Time   NICOTINE SCREEN, URINE Urine, Clean Catch  Collected By: Gini Weiss PCT 4/19/2024 10:27 AM     Release to patient   Routine Release        PREGNANCY, URINE Urine, Clean Catch  Collected By: Gini Weiss PCT 4/19/2024 10:28 AM     Release to patient   Routine Release        PREGNANCY, URINE Urine, Clean Catch   4/19/2024 11:03 AM     Release to patient   Routine Release              Drains:   Closed/Suction Drain Left Thigh (Active)   Site Description Unable to view 04/19/24 1711   Dressing Status Dry;Intact 04/19/24 1711   Status To bulb suction 04/19/24 1711       [REMOVED] Closed/Suction Drain 1 Right Thigh Bulb 15 Fr. (Removed)       [REMOVED] Closed/Suction Drain Distal;Right;Posterior Thigh Bulb (Removed)       [REMOVED] Urethral Catheter (Removed)       Findings:     Tumescent:3300cc  Lipoaspirate: 8500cc  Leg skin weight: 780g  Skin excision total: [id number]  Wound vac size: 130x 3 cm           Complications: none     After  risks and benefits were discussed with patient and informed consent was signed, patient was taken to the OR and positioned supine. She was anesthetized and positioned prone.  The surgical site was then prepped in a sterile fashion way and a time out was performed confirming patient's name and procedure to be performed. All surgical team was introduced by name.   I then infiltrated tumescent and lipoaspirated with a combination of cannulas #5,4 and 3. Then the excess of skin was removed on the upper thigh. The skin was closed in 3 layers with insorb followed by 3-0 monocryl and staples  Then patient was flipped and the process repeated with addition skin excision at the medial aspect of the leg.   Then, an incision wound vac was applied.   Patient tolerated procedure well, there were no complications, all instruments were checked and patient was awakened and taken to PACU in stable condition.  I was present for the entire procedure.     Date: 4/19/2024  Time: 17:22 EDT           Raffaele Willams MD  04/19/24  17:22 EDT

## 2024-04-19 NOTE — INTERVAL H&P NOTE
H&P reviewed. The patient was examined and there are no changes to the H&P.    Patient is not tachycardic  Respirations are non elaborated  Vitals:    04/19/24 1030   BP: 114/93   Pulse: 72   Resp: 16   Temp: 98.1 °F (36.7 °C)   SpO2: 100%     Risks and benefits reminded to patient who agrees to proceed

## 2024-04-19 NOTE — ANESTHESIA POSTPROCEDURE EVALUATION
Patient: Angelique Cordon    Procedure Summary       Date: 04/19/24 Room / Location: [facility] OR  / [facility] OR [facility] [place]    Anesthesia Start: 1146 Anesthesia Stop: 1709    Procedure: LIPOSUCTION, Left Lower Extremity Circumferential Liposuction with Skin Resection and negative pressure dressing (Left) Diagnosis:       Difficulty walking      Pain in both lower extremities      Localized edema      Lipedema      (Difficulty walking [R26.2])      (Pain in both lower extremities [M79.604, M79.605])      (Localized edema [R60.0])      (Lipedema [R60.9])    Surgeons: Raffaele Willams MD Provider: Fang Brunson MD    Anesthesia Type: general ASA Status: 1            Anesthesia Type: general    Vitals  Vitals Value Taken Time   /75 04/19/24 1722   Temp 35.8 °C (96.5 °F) 04/19/24 1705   Pulse 85 04/19/24 1735   Resp 9 04/19/24 1705   SpO2 99 % 04/19/24 1735   Vitals shown include unfiled device data.        Post Anesthesia Care and Evaluation    Patient location during evaluation: bedside  Patient participation: complete - patient participated  Level of consciousness: awake and alert  Pain management: adequate    Airway patency: patent  Anesthetic complications: No anesthetic complications  PONV Status: none  Cardiovascular status: acceptable  Respiratory status: acceptable  Hydration status: acceptable    Comments: An Anesthesiologist personally participated in the most demanding procedures (including induction and emergence if applicable) in the anesthesia plan, monitored the course of anesthesia administration at frequent intervals and remained physically present and available for immediate diagnosis and treatment of emergencies.

## 2024-04-20 PROBLEM — D62 ACUTE BLOOD LOSS AS CAUSE OF POSTOPERATIVE ANEMIA: Status: ACTIVE | Noted: 2024-04-20

## 2024-04-20 LAB
ANION GAP SERPL CALCULATED.3IONS-SCNC: 7.9 MMOL/L (ref 5–15)
BUN SERPL-MCNC: 8 MG/DL (ref 6–20)
BUN/CREAT SERPL: 12.9 (ref 7–25)
CALCIUM SPEC-SCNC: 7.7 MG/DL (ref 8.6–10.5)
CHLORIDE SERPL-SCNC: 105 MMOL/L (ref 98–107)
CO2 SERPL-SCNC: 22.1 MMOL/L (ref 22–29)
CREAT SERPL-MCNC: 0.62 MG/DL (ref 0.57–1)
DEPRECATED RDW RBC AUTO: 45.8 FL (ref 37–54)
EGFRCR SERPLBLD CKD-EPI 2021: 112.8 ML/MIN/1.73
ERYTHROCYTE [DISTWIDTH] IN BLOOD BY AUTOMATED COUNT: 13.1 % (ref 12.3–15.4)
GLUCOSE SERPL-MCNC: 196 MG/DL (ref 65–99)
HCT VFR BLD AUTO: 25.4 % (ref 34–46.6)
HGB BLD-MCNC: 8.2 G/DL (ref 12–15.9)
MCH RBC QN AUTO: 31.2 PG (ref 26.6–33)
MCHC RBC AUTO-ENTMCNC: 32.3 G/DL (ref 31.5–35.7)
MCV RBC AUTO: 96.6 FL (ref 79–97)
PLATELET # BLD AUTO: 203 10*3/MM3 (ref 140–450)
PMV BLD AUTO: 11.4 FL (ref 6–12)
POTASSIUM SERPL-SCNC: 3.9 MMOL/L (ref 3.5–5.2)
RBC # BLD AUTO: 2.63 10*6/MM3 (ref 3.77–5.28)
SODIUM SERPL-SCNC: 135 MMOL/L (ref 136–145)
WBC NRBC COR # BLD AUTO: 7.57 10*3/MM3 (ref 3.4–10.8)

## 2024-04-20 PROCEDURE — 25010000002 HYDROMORPHONE 1 MG/ML SOLUTION: Performed by: SURGERY

## 2024-04-20 PROCEDURE — 85027 COMPLETE CBC AUTOMATED: CPT | Performed by: SURGERY

## 2024-04-20 PROCEDURE — 25010000002 CEFAZOLIN SODIUM 2 G RECONSTITUTED SOLUTION: Performed by: SURGERY

## 2024-04-20 PROCEDURE — 80048 BASIC METABOLIC PNL TOTAL CA: CPT | Performed by: SURGERY

## 2024-04-20 PROCEDURE — 25010000002 ONDANSETRON PER 1 MG: Performed by: SURGERY

## 2024-04-20 RX ADMIN — NAPROXEN 250 MG: 250 TABLET ORAL at 21:14

## 2024-04-20 RX ADMIN — ACETAMINOPHEN 1000 MG: 500 TABLET ORAL at 16:17

## 2024-04-20 RX ADMIN — HYDROMORPHONE HYDROCHLORIDE 0.5 MG: 1 INJECTION, SOLUTION INTRAMUSCULAR; INTRAVENOUS; SUBCUTANEOUS at 22:56

## 2024-04-20 RX ADMIN — HYDROMORPHONE HYDROCHLORIDE 0.5 MG: 1 INJECTION, SOLUTION INTRAMUSCULAR; INTRAVENOUS; SUBCUTANEOUS at 10:27

## 2024-04-20 RX ADMIN — ONDANSETRON 4 MG: 2 INJECTION INTRAMUSCULAR; INTRAVENOUS at 10:27

## 2024-04-20 RX ADMIN — CEFAZOLIN 2000 MG: 2 INJECTION, POWDER, FOR SOLUTION INTRAVENOUS at 00:30

## 2024-04-20 RX ADMIN — CEFAZOLIN 2000 MG: 2 INJECTION, POWDER, FOR SOLUTION INTRAVENOUS at 08:57

## 2024-04-20 RX ADMIN — DEXTROSE AND SODIUM CHLORIDE 125 ML/HR: 5; 450 INJECTION, SOLUTION INTRAVENOUS at 02:20

## 2024-04-20 RX ADMIN — ONDANSETRON 4 MG: 2 INJECTION INTRAMUSCULAR; INTRAVENOUS at 01:43

## 2024-04-20 RX ADMIN — NAPROXEN 250 MG: 250 TABLET ORAL at 08:57

## 2024-04-20 NOTE — PLAN OF CARE
Goal Outcome Evaluation:  Plan of Care Reviewed With: patient        Progress: improving  Outcome Evaluation: Pt recovering well following surgery yesterday. Wound vac in place with 250 mL output in chamber overnight. OLGA drain output 50 mL. She was medicated with prn pain and nausea meds last night and states she feels much better this morning.  Soco Aguirre RN

## 2024-04-20 NOTE — PROGRESS NOTES
PLASTIC SURGERY PROGRESS NOTE    Patient Identification:  Name: Angelique Cordon    Age: 44 y.o.    Sex: female   :  1980  MRN: [id number]               Subjective:  No acute events.    Objective:    Continuous Infusions:dextrose 5 % and sodium chloride 0.45 %, 125 mL/hr, Last Rate: 125 mL/hr (24 0220)        Scheduled Meds:naproxen, 250 mg, Oral, BID      PRN Meds:  acetaminophen **OR** acetaminophen    acetaminophen    HYDROmorphone **AND** naloxone    ondansetron ODT **OR** ondansetron    ondansetron ODT    oxyCODONE-acetaminophen    senna-docusate sodium    Vital signs in last 24 hours:  Vitals:    24 2116 24 2315 24 0310 24 0805   BP: 112/62 110/67 109/67 94/47   BP Location: Left arm Left arm Left arm Left arm   Patient Position: Lying Lying Lying Lying   Pulse: 95 93 90 90   Resp: 14 16 16 18   Temp: 97.7 °F (36.5 °C) 97.9 °F (36.6 °C) 98.6 °F (37 °C) 98.8 °F (37.1 °C)   TempSrc: Oral Oral Oral Oral   SpO2: 99% 99% 99% 99%   Weight:       Height:           Intake/Output:I/O last 3 completed shifts:  In: 3200 [P.O.:50; I.V.:2950; IV Piggyback:200]  Out: 2310 [Urine:1460; Emesis/NG output:200; Drains:50; Blood:600]    Exam:  GEN: no acute distress, resting quietly   HEENT: NCAT, EOMI, MMM   Wound vac in place. Appropriated change.   Wrap changed. Skin changes as expected for post op.       Recent Results (from the past 24 hour(s))   Nicotine Screen, Urine - Urine, Clean Catch    Collection Time: 24 10:35 AM    Specimen: Urine, Clean Catch   Result Value Ref Range    Cotinine Screen, Urine  Negative Negative   Pregnancy, Urine - Urine, Clean Catch    Collection Time: 24 10:35 AM    Specimen: Urine, Clean Catch   Result Value Ref Range    HCG, Urine QL Negative Negative   CBC (No Diff)    Collection Time: 24  4:06 AM    Specimen: Blood   Result Value Ref Range    WBC 7.57 3.40 - 10.80 10*3/mm3    RBC 2.63 (L) 3.77 - 5.28 10*6/mm3    Hemoglobin 8.2 (L) 12.0 -  15.9 g/dL    Hematocrit 25.4 (L) 34.0 - 46.6 %    MCV 96.6 79.0 - 97.0 fL    MCH 31.2 26.6 - 33.0 pg    MCHC 32.3 31.5 - 35.7 g/dL    RDW 13.1 12.3 - 15.4 %    RDW-SD 45.8 37.0 - 54.0 fl    MPV 11.4 6.0 - 12.0 fL    Platelets 203 140 - 450 10*3/mm3   Basic Metabolic Panel    Collection Time: 04/20/24  4:06 AM    Specimen: Blood   Result Value Ref Range    Glucose 196 (H) 65 - 99 mg/dL    BUN 8 6 - 20 mg/dL    Creatinine 0.62 0.57 - 1.00 mg/dL    Sodium 135 (L) 136 - 145 mmol/L    Potassium 3.9 3.5 - 5.2 mmol/L    Chloride 105 98 - 107 mmol/L    CO2 22.1 22.0 - 29.0 mmol/L    Calcium 7.7 (L) 8.6 - 10.5 mg/dL    BUN/Creatinine Ratio 12.9 7.0 - 25.0    Anion Gap 7.9 5.0 - 15.0 mmol/L    eGFR 112.8 >60.0 mL/min/1.73         Assessment:    Lipedema    Localized edema    Pain in both lower extremities    Difficulty walking    Acute blood loss as cause of postoperative anemia      1 Day Post-Op Procedure(s) (LRB):  LIPOSUCTION, Left Lower Extremity Circumferential Liposuction with Skin Resection and negative pressure dressing (Left)    Plan:  Doing well, plan to admit to inpatient due to the acute blood loss. I will not order blood at this time but I will order new labs for tomorrow am. Plan is to keep her with the wound vac until dcd. She won't go to home with wound vac   Keep bed rest, keep deleon   Raffaele Willams MD    4/20/2024

## 2024-04-21 LAB
ABO GROUP BLD: NORMAL
ANION GAP SERPL CALCULATED.3IONS-SCNC: 6 MMOL/L (ref 5–15)
BLD GP AB SCN SERPL QL: NEGATIVE
BUN SERPL-MCNC: 5 MG/DL (ref 6–20)
BUN/CREAT SERPL: 8.8 (ref 7–25)
CALCIUM SPEC-SCNC: 7.6 MG/DL (ref 8.6–10.5)
CHLORIDE SERPL-SCNC: 107 MMOL/L (ref 98–107)
CO2 SERPL-SCNC: 25 MMOL/L (ref 22–29)
CREAT SERPL-MCNC: 0.57 MG/DL (ref 0.57–1)
DEPRECATED RDW RBC AUTO: 46.7 FL (ref 37–54)
EGFRCR SERPLBLD CKD-EPI 2021: 115.1 ML/MIN/1.73
ERYTHROCYTE [DISTWIDTH] IN BLOOD BY AUTOMATED COUNT: 13.2 % (ref 12.3–15.4)
GLUCOSE SERPL-MCNC: 110 MG/DL (ref 65–99)
HCT VFR BLD AUTO: 19.8 % (ref 34–46.6)
HCT VFR BLD AUTO: 27 % (ref 34–46.6)
HGB BLD-MCNC: 6.3 G/DL (ref 12–15.9)
HGB BLD-MCNC: 8.8 G/DL (ref 12–15.9)
MCH RBC QN AUTO: 31.2 PG (ref 26.6–33)
MCHC RBC AUTO-ENTMCNC: 31.8 G/DL (ref 31.5–35.7)
MCV RBC AUTO: 98 FL (ref 79–97)
PLATELET # BLD AUTO: 124 10*3/MM3 (ref 140–450)
PMV BLD AUTO: 12 FL (ref 6–12)
POTASSIUM SERPL-SCNC: 3.7 MMOL/L (ref 3.5–5.2)
RBC # BLD AUTO: 2.02 10*6/MM3 (ref 3.77–5.28)
RH BLD: POSITIVE
SODIUM SERPL-SCNC: 138 MMOL/L (ref 136–145)
T&S EXPIRATION DATE: NORMAL
WBC NRBC COR # BLD AUTO: 4.34 10*3/MM3 (ref 3.4–10.8)

## 2024-04-21 PROCEDURE — 25010000002 ONDANSETRON PER 1 MG: Performed by: SURGERY

## 2024-04-21 PROCEDURE — 86850 RBC ANTIBODY SCREEN: CPT | Performed by: SURGERY

## 2024-04-21 PROCEDURE — 86901 BLOOD TYPING SEROLOGIC RH(D): CPT | Performed by: SURGERY

## 2024-04-21 PROCEDURE — 80048 BASIC METABOLIC PNL TOTAL CA: CPT | Performed by: SURGERY

## 2024-04-21 PROCEDURE — 86900 BLOOD TYPING SEROLOGIC ABO: CPT | Performed by: SURGERY

## 2024-04-21 PROCEDURE — 86900 BLOOD TYPING SEROLOGIC ABO: CPT

## 2024-04-21 PROCEDURE — 36430 TRANSFUSION BLD/BLD COMPNT: CPT

## 2024-04-21 PROCEDURE — P9016 RBC LEUKOCYTES REDUCED: HCPCS

## 2024-04-21 PROCEDURE — 86923 COMPATIBILITY TEST ELECTRIC: CPT

## 2024-04-21 PROCEDURE — 85018 HEMOGLOBIN: CPT | Performed by: SURGERY

## 2024-04-21 PROCEDURE — 85014 HEMATOCRIT: CPT | Performed by: SURGERY

## 2024-04-21 PROCEDURE — 85027 COMPLETE CBC AUTOMATED: CPT | Performed by: SURGERY

## 2024-04-21 RX ADMIN — DEXTROSE AND SODIUM CHLORIDE 125 ML/HR: 5; 450 INJECTION, SOLUTION INTRAVENOUS at 17:16

## 2024-04-21 RX ADMIN — ONDANSETRON 4 MG: 2 INJECTION INTRAMUSCULAR; INTRAVENOUS at 08:18

## 2024-04-21 RX ADMIN — ACETAMINOPHEN 650 MG: 325 TABLET ORAL at 08:19

## 2024-04-21 RX ADMIN — NAPROXEN 250 MG: 250 TABLET ORAL at 11:16

## 2024-04-21 RX ADMIN — DEXTROSE AND SODIUM CHLORIDE 125 ML/HR: 5; 450 INJECTION, SOLUTION INTRAVENOUS at 06:06

## 2024-04-21 RX ADMIN — ACETAMINOPHEN 650 MG: 325 TABLET ORAL at 17:15

## 2024-04-21 RX ADMIN — NAPROXEN 250 MG: 250 TABLET ORAL at 20:43

## 2024-04-21 NOTE — PROGRESS NOTES
PLASTIC SURGERY PROGRESS NOTE    Patient Identification:  Name: Angelique Cordon    Age: 44 y.o.    Sex: female   :  1980  MRN: [id number]               Subjective:  No acute events.    Objective:    Continuous Infusions:dextrose 5 % and sodium chloride 0.45 %, 125 mL/hr, Last Rate: 125 mL/hr (24 0606)        Scheduled Meds:naproxen, 250 mg, Oral, BID      PRN Meds:  acetaminophen **OR** acetaminophen    acetaminophen    HYDROmorphone **AND** naloxone    ondansetron ODT **OR** ondansetron    ondansetron ODT    oxyCODONE-acetaminophen    senna-docusate sodium    Vital signs in last 24 hours:  Vitals:    24 2247 24 0301 24 0802 24 0840   BP: 98/51 101/58 103/50 108/54   BP Location:  Left arm Left arm    Patient Position:  Lying Lying    Pulse: 110 103 110 109   Resp:  16  18   Temp: 99.7 °F (37.6 °C) 99 °F (37.2 °C) 99.7 °F (37.6 °C) 99.1 °F (37.3 °C)   TempSrc:  Oral Oral Oral   SpO2: 97% 98% 99% 97%   Weight:       Height:           Intake/Output:I/O last 3 completed shifts:  In:  [I.V.:]  Out: 2930 [Urine:2400; Emesis/NG output:200; Drains:80; Other:250]    Exam:  GEN: no acute distress, resting quietly   Left leg with expected post op swelling and erythema.       Recent Results (from the past 24 hour(s))   CBC (No Diff)    Collection Time: 24  4:25 AM    Specimen: Blood   Result Value Ref Range    WBC 4.34 3.40 - 10.80 10*3/mm3    RBC 2.02 (L) 3.77 - 5.28 10*6/mm3    Hemoglobin 6.3 (C) 12.0 - 15.9 g/dL    Hematocrit 19.8 (C) 34.0 - 46.6 %    MCV 98.0 (H) 79.0 - 97.0 fL    MCH 31.2 26.6 - 33.0 pg    MCHC 31.8 31.5 - 35.7 g/dL    RDW 13.2 12.3 - 15.4 %    RDW-SD 46.7 37.0 - 54.0 fl    MPV 12.0 6.0 - 12.0 fL    Platelets 124 (L) 140 - 450 10*3/mm3   Basic Metabolic Panel    Collection Time: 24  4:25 AM    Specimen: Blood   Result Value Ref Range    Glucose 110 (H) 65 - 99 mg/dL    BUN 5 (L) 6 - 20 mg/dL    Creatinine 0.57 0.57 - 1.00 mg/dL    Sodium 138 136 -  145 mmol/L    Potassium 3.7 3.5 - 5.2 mmol/L    Chloride 107 98 - 107 mmol/L    CO2 25.0 22.0 - 29.0 mmol/L    Calcium 7.6 (L) 8.6 - 10.5 mg/dL    BUN/Creatinine Ratio 8.8 7.0 - 25.0    Anion Gap 6.0 5.0 - 15.0 mmol/L    eGFR 115.1 >60.0 mL/min/1.73   Type & Screen    Collection Time: 04/21/24  5:31 AM    Specimen: Blood   Result Value Ref Range    ABO Type O     RH type Positive     Antibody Screen Negative     T&S Expiration Date 4/24/2024 11:59:59 PM    Prepare RBC, 2 Units    Collection Time: 04/21/24  8:21 AM   Result Value Ref Range    Product Code [id number]     Unit Number [id number]     UNIT  ABO O     UNIT  RH POS     Crossmatch Interpretation Compatible     Dispense Status IS     Blood Expiration Date [id number]     Blood Type Barcode 5100          Assessment:    Lipedema    Localized edema    Pain in both lower extremities    Difficulty walking    Acute blood loss as cause of postoperative anemia      2 Days Post-Op Procedure(s) (LRB):  LIPOSUCTION, Left Lower Extremity Circumferential Liposuction with Skin Resection and negative pressure dressing (Left)    Plan:  Doing well, getting her 1st unit of blood, she will get a second one. She will still be bed rest today. I showed patient how to move her legs.     Raffaele Willams MD    4/21/2024

## 2024-04-21 NOTE — PLAN OF CARE
Goal Outcome Evaluation:  Plan of Care Reviewed With: patient        Progress: no change  Outcome Evaluation: Hgb 6.3 with am labs, provider aware. Will transfuse 2 units PRBCs. Surgical site WNL. Soco Aguirre RN

## 2024-04-21 NOTE — PLAN OF CARE
Goal Outcome Evaluation:         Patient pleasant through shift. VSS. Two units of blood transfused. Tolerated well. Complaints of nausea and pain treated with PRN medicine. See MAR. Continued bedrest. Call light and table within reach. Bed in lowest locked position. No concerns per patient at this time.   Keri Murillo RN

## 2024-04-22 LAB
BH BB BLOOD EXPIRATION DATE: NORMAL
BH BB BLOOD EXPIRATION DATE: NORMAL
BH BB BLOOD TYPE BARCODE: 5100
BH BB BLOOD TYPE BARCODE: 5100
BH BB DISPENSE STATUS: NORMAL
BH BB DISPENSE STATUS: NORMAL
BH BB PRODUCT CODE: NORMAL
BH BB PRODUCT CODE: NORMAL
BH BB UNIT NUMBER: NORMAL
BH BB UNIT NUMBER: NORMAL
CROSSMATCH INTERPRETATION: NORMAL
CROSSMATCH INTERPRETATION: NORMAL
UNIT  ABO: NORMAL
UNIT  ABO: NORMAL
UNIT  RH: NORMAL
UNIT  RH: NORMAL

## 2024-04-22 PROCEDURE — 97161 PT EVAL LOW COMPLEX 20 MIN: CPT

## 2024-04-22 RX ADMIN — NAPROXEN 250 MG: 250 TABLET ORAL at 20:19

## 2024-04-22 RX ADMIN — DEXTROSE AND SODIUM CHLORIDE 125 ML/HR: 5; 450 INJECTION, SOLUTION INTRAVENOUS at 00:45

## 2024-04-22 RX ADMIN — DEXTROSE AND SODIUM CHLORIDE 125 ML/HR: 5; 450 INJECTION, SOLUTION INTRAVENOUS at 08:18

## 2024-04-22 RX ADMIN — SENNOSIDES AND DOCUSATE SODIUM 2 TABLET: 50; 8.6 TABLET ORAL at 08:18

## 2024-04-22 RX ADMIN — OXYCODONE HYDROCHLORIDE AND ACETAMINOPHEN 1 TABLET: 5; 325 TABLET ORAL at 09:39

## 2024-04-22 RX ADMIN — NAPROXEN 250 MG: 250 TABLET ORAL at 08:13

## 2024-04-22 RX ADMIN — ACETAMINOPHEN 1000 MG: 500 TABLET ORAL at 14:50

## 2024-04-22 RX ADMIN — DEXTROSE AND SODIUM CHLORIDE 125 ML/HR: 5; 450 INJECTION, SOLUTION INTRAVENOUS at 16:49

## 2024-04-22 RX ADMIN — DEXTROSE AND SODIUM CHLORIDE 125 ML/HR: 5; 450 INJECTION, SOLUTION INTRAVENOUS at 22:59

## 2024-04-22 NOTE — PLAN OF CARE
Goal Outcome Evaluation:           Progress: improving  Outcome Evaluation: VSS with BP still soft, MD aware, HGB up to 8.8 after blood yesterday, no c/o pain or nausea this shift, controlled with scheduled naproxen, wound vac in place, dressing C/D/I, OLGA with minimal bloody drainage, FC to BSD with Yellow output, SCD to right leg, continue plan of care. Emili Weaver RN                                  69

## 2024-04-22 NOTE — THERAPY EVALUATION
Acute Care - Physical Therapy Initial Evaluation   [place]     Patient Name: Angelique Cordon  : 1980  MRN: [id number]  Today's Date: 2024      Visit Dx:     ICD-10-CM ICD-9-CM   1. Difficulty walking  R26.2 719.7     Patient Active Problem List   Diagnosis    Annual physical exam    Depression    Sebaceous cyst    Varicose veins of both lower extremities with pain    Lipedema    Localized edema    Pain in both lower extremities    Difficulty walking    Anxiety    Overweight (BMI 25.0-29.9)    Bleeding    Surgical wound dehiscence    Postoperative follow-up    Anemia    Acute blood loss as cause of postoperative anemia     Past Medical History:   Diagnosis Date    Abnormal bone density screening     Anxiety     History of transfusion     Lipedema of lower extremity     LEGS AND ARMS    Pap smear for cervical cancer screening 2018    Varicose vein of leg     Visit for screening mammogram      Past Surgical History:   Procedure Laterality Date    EXCISION LESION Left 2022    Procedure: EXCISION CYST LEFT AXILLARY;  Surgeon: Rosalie Talley MD;  Location: [facility];  Service: General;  Laterality: Left;    FOOT SURGERY Left  HAD 3 DIFFERENT SURGERIES    L FOOT/TENDON RELEASE PLANTAR FASIA    LIPOSUCTION Right 10/24/2023    Procedure: LIPOSUCTION, Right Lower Extremity Circumferential Liposuction with Skin Resection and negative pressure dressing;  Surgeon: Raffaele Willams MD;  Location: [facility];  Service: Plastics;  Laterality: Right;    LIPOSUCTION Left 2024    Procedure: LIPOSUCTION, Left Lower Extremity Circumferential Liposuction with Skin Resection and negative pressure dressing;  Surgeon: Raffaele Willams MD;  Location: [facility];  Service: Plastics;  Laterality: Left;     PT Assessment (Last 12 Hours)       PT Evaluation and Treatment       Row Name 24 1300          Physical Therapy Time and Intention    Document Type evaluation  -AV     Mode  of Treatment individual therapy;physical therapy  -AV       Row Name 04/22/24 1300          General Information    Patient Profile Reviewed yes  -AV     Patient Observations alert;cooperative;agree to therapy  -AV     Prior Level of Function independent:;all household mobility;gait;transfer;ADL's  Ambulated without an assistive device. No home O2.  -AV     Equipment Currently Used at Home none  -AV     Existing Precautions/Restrictions fall;other (see comments)  would vac LLE  -AV       Row Name 04/22/24 1300          Living Environment    Current Living Arrangements home  -AV     Home Accessibility stairs to enter home;stairs within home  -AV     People in Home spouse  -AV       Row Name 04/22/24 1300          Home Main Entrance    Number of Stairs, Main Entrance two;three  -AV       Row Name 04/22/24 1300          Stairs Within Home, Primary    Stairs, Within Home, Primary Second level bedroom  -AV       Row Name 04/22/24 1300          Pain    Pre/Posttreatment Pain Comment Reports nurse administered pain medication prior to therapist arrival  -AV       Row Name 04/22/24 1300          Cognition    Orientation Status (Cognition) oriented x 4  -AV       Row Name 04/22/24 1300          Range of Motion (ROM)    Range of Motion bilateral lower extremities;ROM is WFL  -AV       Row Name 04/22/24 1300          Mobility    Extremity Weight-bearing Status left lower extremity  -AV     Left Lower Extremity (Weight-bearing Status) non weight-bearing (NWB)  with wound vac  -AV       Row Name 04/22/24 1300          Bed Mobility    Bed Mobility bed mobility (all) activities  -AV     All Activities, [place] (Bed Mobility) standby assist  -AV       Row Name 04/22/24 1300          Transfers    Transfers sit-stand transfer;stand-sit transfer  -AV       Row Name 04/22/24 1300          Sit-Stand Transfer    Sit-Stand [place] (Transfers) contact guard  -AV     Assistive Device (Sit-Stand Transfers) walker, front-wheeled  -AV        Row Name 04/22/24 1300          Stand-Sit Transfer    Stand-Sit [place] (Transfers) contact guard  -AV     Assistive Device (Stand-Sit Transfers) walker, front-wheeled  -AV       Row Name 04/22/24 1300          Gait/Stairs (Locomotion)    Gait/Stairs Locomotion gait/ambulation independence;gait/ambulation assistive device;distance ambulated  -AV     [place] Level (Gait) contact guard  -AV     Assistive Device (Gait) walker, front-wheeled  -AV     Distance in Feet (Gait) --  1 hop foward, backward, right  -AV       Row Name 04/22/24 1300          Safety Issues, Functional Mobility    Impairments Affecting Function (Mobility) balance;endurance/activity tolerance;pain  -AV       Row Name 04/22/24 1300          Balance    Balance Assessment standing dynamic balance  -AV     Dynamic Standing Balance contact guard  -AV     Position/Device Used, Standing Balance supported;walker, front-wheeled  -AV       Row Name             Wound 04/19/24 Left posterior thigh Incision    Wound - Properties Group Placement Date: 04/19/24  - Present on Original Admission: N  -JH Side: Left  -JH Orientation: posterior  -JH Location: thigh  -JH Primary Wound Type: Incision  -JH    Retired Wound - Properties Group Placement Date: 04/19/24  - Present on Original Admission: N  -JH Side: Left  -JH Orientation: posterior  -JH Location: thigh  -JH Primary Wound Type: Incision  -JH    Retired Wound - Properties Group Date first assessed: 04/19/24  - Present on Original Admission: N  -JH Side: Left  -JH Location: thigh  -JH Primary Wound Type: Incision  -JH      Row Name             Wound 04/19/24 Left calf Incision    Wound - Properties Group Placement Date: 04/19/24  - Present on Original Admission: N  -JH Side: Left  -JH Location: calf  -JH Primary Wound Type: Incision  -JH    Retired Wound - Properties Group Placement Date: 04/19/24  - Present on Original Admission: N  -JH Side: Left  -JH Location: calf  -JH Primary  Wound Type: Incision  -JH    Retired Wound - Properties Group Date first assessed: 04/19/24  -JH Present on Original Admission: N  -JH Side: Left  -JH Location: calf  -JH Primary Wound Type: Incision  -JH      Row Name             Wound 04/19/24 Left posterior thigh Incision    Wound - Properties Group Placement Date: 04/19/24  -JH Present on Original Admission: N  -JH Side: Left  -JH Orientation: posterior  -JH Location: thigh  -JH Primary Wound Type: Incision  -JH    Retired Wound - Properties Group Placement Date: 04/19/24  -JH Present on Original Admission: N  -JH Side: Left  -JH Orientation: posterior  -JH Location: thigh  -JH Primary Wound Type: Incision  -JH    Retired Wound - Properties Group Date first assessed: 04/19/24  -JH Present on Original Admission: N  -JH Side: Left  -JH Location: thigh  -JH Primary Wound Type: Incision  -JH      Row Name             Wound 04/19/24 Left anterior thigh Incision    Wound - Properties Group Placement Date: 04/19/24  -JH Side: Left  -JH Orientation: anterior  -JH Location: thigh  -JH Primary Wound Type: Incision  -JH    Retired Wound - Properties Group Placement Date: 04/19/24  -JH Side: Left  -JH Orientation: anterior  -JH Location: thigh  -JH Primary Wound Type: Incision  -JH    Retired Wound - Properties Group Date first assessed: 04/19/24  -JH Side: Left  -JH Location: thigh  -JH Primary Wound Type: Incision  -JH      Row Name             Wound 04/19/24 Left distal leg Incision    Wound - Properties Group Placement Date: 04/19/24  -JH Present on Original Admission: N  -JH Side: Left  -JH Orientation: distal  -JH Location: leg  -JH Primary Wound Type: Incision  -JH    Retired Wound - Properties Group Placement Date: 04/19/24  -JH Present on Original Admission: N  -JH Side: Left  -JH Orientation: distal  -JH Location: leg  -JH Primary Wound Type: Incision  -JH    Retired Wound - Properties Group Date first assessed: 04/19/24  -JH Present on Original Admission: N  -JH  Side: Left  -JH Location: leg  -JH Primary Wound Type: Incision  -JH      Row Name             NPWT (Negative Pressure Wound Therapy) 04/19/24 left thigh    NPWT (Negative Pressure Wound Therapy) - Properties Group Placement Date: 04/19/24  -LD Location: left thigh  -LD Additional Comments: wound vac placed in PACU  -LD    Retired NPWT (Negative Pressure Wound Therapy) - Properties Group Placement Date: 04/19/24  -LD Location: left thigh  -LD Additional Comments: wound vac placed in PACU  -LD    Retired NPWT (Negative Pressure Wound Therapy) - Properties Group Placement Date: 04/19/24  -LD Location: left thigh  -LD Additional Comments: wound vac placed in PACU  -LD      Row Name 04/22/24 1300          Plan of Care Review    Plan of Care Reviewed With patient  -AV     Progress no change  -AV     Outcome Evaluation Patient presents with deficits in balance, endurance, transfers, and ambulation. Patient will benefit from skilled PT services to address these mobility deficits and decrease risk of falls.  -AV       Row Name 04/22/24 1300          Positioning and Restraints    Pre-Treatment Position in bed  -AV     Post Treatment Position bed  -AV     In Bed supine;call light within reach;encouraged to call for assist  -AV       Row Name 04/22/24 1300          Therapy Assessment/Plan (PT)    Rehab Potential (PT) good, to achieve stated therapy goals  -AV     Criteria for Skilled Interventions Met (PT) yes;meets criteria  -AV     Therapy Frequency (PT) daily  -AV     Predicted Duration of Therapy Intervention (PT) 10 days  -AV     Problem List (PT) problems related to;balance;mobility;pain  -AV     Activity Limitations Related to Problem List (PT) unable to transfer safely;unable to ambulate safely  -AV       Row Name 04/22/24 1300          PT Evaluation Complexity    History, PT Evaluation Complexity no personal factors and/or comorbidities  -AV     Examination of Body Systems (PT Eval Complexity) total of 4 or more  elements  -AV     Clinical Presentation (PT Evaluation Complexity) stable  -AV     Clinical Decision Making (PT Evaluation Complexity) low complexity  -AV     Overall Complexity (PT Evaluation Complexity) low complexity  -AV       Row Name 04/22/24 1300          Therapy Plan Review/Discharge Plan (PT)    Therapy Plan Review (PT) evaluation/treatment results reviewed;patient  -AV       Row Name 04/22/24 1300          Physical Therapy Goals    Transfer Goal Selection (PT) transfer, PT goal 1  -AV     Gait Training Goal Selection (PT) gait training, PT goal 1  -AV       Row Name 04/22/24 1300          Transfer Goal 1 (PT)    Activity/Assistive Device (Transfer Goal 1, PT) sit-to-stand/stand-to-sit;bed-to-chair/chair-to-bed;walker, rolling  -AV     [place] Level/Cues Needed (Transfer Goal 1, PT) modified independence  -AV     Time Frame (Transfer Goal 1, PT) 10 days  -AV       Row Name 04/22/24 1300          Gait Training Goal 1 (PT)    Activity/Assistive Device (Gait Training Goal 1, PT) gait (walking locomotion);assistive device use;walker, rolling  -AV     [place] Level (Gait Training Goal 1, PT) modified independence  -AV     Distance (Gait Training Goal 1, PT) 25  -AV     Time Frame (Gait Training Goal 1, PT) 10 days  -AV               User Key  (r) = Recorded By, (t) = Taken By, (c) = Cosigned By      Initials Name Provider Type    Soco Schreiber, RN Registered Nurse    Adama Hernandez, PT Physical Therapist    Saida Thorpe, RN Registered Nurse                    Physical Therapy Education       Title: PT OT SLP Therapies (In Progress)       Topic: Physical Therapy (In Progress)       Point: Mobility training (Done)       Learning Progress Summary             Patient Acceptance, E,TB, VU by AV at 4/22/2024 4293                         Point: Home exercise program (Not Started)       Learner Progress:  Not documented in this visit.              Point: Body mechanics (Done)       Learning  Progress Summary             Patient Acceptance, E,TB, VU by AV at 4/22/2024 1349                         Point: Precautions (Done)       Learning Progress Summary             Patient Acceptance, E,TB, VU by AV at 4/22/2024 1349                                         User Key       Initials Effective Dates Name Provider Type Discipline     06/11/21 -  Adama Lantigua, PT Physical Therapist PT                  PT Recommendation and Plan  Anticipated Discharge Disposition (PT): home with assist, home with home health  Planned Therapy Interventions (PT): balance training, bed mobility training, gait training, home exercise program, neuromuscular re-education, strengthening, transfer training  Therapy Frequency (PT): daily  Plan of Care Reviewed With: patient  Progress: no change  Outcome Evaluation: Patient presents with deficits in balance, endurance, transfers, and ambulation. Patient will benefit from skilled PT services to address these mobility deficits and decrease risk of falls.   Outcome Measures       Row Name 04/22/24 1300             How much help from another person do you currently need...    Turning from your back to your side while in flat bed without using bedrails? 4  -AV      Moving from lying on back to sitting on the side of a flat bed without bedrails? 3  -AV      Moving to and from a bed to a chair (including a wheelchair)? 3  -AV      Standing up from a chair using your arms (e.g., wheelchair, bedside chair)? 3  -AV      Climbing 3-5 steps with a railing? 3  -AV      To walk in hospital room? 3  -AV      AM-PAC 6 Clicks Score (PT) 19  -AV      Highest Level of Mobility Goal 6 --> Walk 10 steps or more  -AV         Functional Assessment    Outcome Measure Options AM-PAC 6 Clicks Basic Mobility (PT)  -AV                User Key  (r) = Recorded By, (t) = Taken By, (c) = Cosigned By      Initials Name Provider Type    AV Adama Lantigua, PT Physical Therapist                     Time  Calculation:    PT Charges       Row Name 04/22/24 1347             Time Calculation    PT Received On 04/22/24  -AV      PT Goal Re-Cert Due Date 05/01/24  -AV         Untimed Charges    PT Eval/Re-eval Minutes 35  -AV         Total Minutes    Untimed Charges Total Minutes 35  -AV       Total Minutes 35  -AV                User Key  (r) = Recorded By, (t) = Taken By, (c) = Cosigned By      Initials Name Provider Type    AV Adama Lantigua, PT Physical Therapist                  Therapy Charges for Today       Code Description Service Date Service Provider Modifiers Qty    46933448451 HC PT EVAL LOW COMPLEXITY 3 4/22/2024 Adama Lantigua, PT GP 1            PT G-Codes  Outcome Measure Options: AM-PAC 6 Clicks Basic Mobility (PT)  AM-PAC 6 Clicks Score (PT): 19    Adama Lantigua, PT  4/22/2024

## 2024-04-22 NOTE — PLAN OF CARE
Goal Outcome Evaluation:  Plan of Care Reviewed With: patient        Progress: no change  Outcome Evaluation: Patient presents with deficits in balance, endurance, transfers, and ambulation. Patient will benefit from skilled PT services to address these mobility deficits and decrease risk of falls.      Anticipated Discharge Disposition (PT): home with assist, home with home health

## 2024-04-22 NOTE — PROGRESS NOTES
PLASTIC SURGERY PROGRESS NOTE    Patient Identification:  Name: Angelique Cordon    Age: 44 y.o.    Sex: female   :  1980  MRN: [id number]               Subjective:  No acute events.    Objective:    Continuous Infusions:dextrose 5 % and sodium chloride 0.45 %, 125 mL/hr, Last Rate: 125 mL/hr (24 0045)        Scheduled Meds:naproxen, 250 mg, Oral, BID      PRN Meds:  acetaminophen **OR** acetaminophen    acetaminophen    HYDROmorphone **AND** naloxone    ondansetron ODT **OR** ondansetron    ondansetron ODT    oxyCODONE-acetaminophen    senna-docusate sodium    Vital signs in last 24 hours:  Vitals:    24 1944 24 2338 24 0325 24 032   BP: 98/49 107/45 (!) 88/41 90/44   BP Location: Left arm Left arm Left arm    Patient Position: Lying Lying Lying    Pulse: 98 95 94    Resp: 18 18 18    Temp: 98.8 °F (37.1 °C) 98.8 °F (37.1 °C) 98.6 °F (37 °C)    TempSrc: Oral Oral Oral    SpO2: 97% 97% 97%    Weight:       Height:           Intake/Output:I/O last 3 completed shifts:  In: 3282.8 [P.O.:800; I.V.:1934; Blood:548.8]  Out: 4980 [Urine:4675; Drains:5]    Exam:  GEN: no acute distress, resting quietly   HEENT: NCAT, EOMI, MMM   HEART: normal rate, regular rhythm   LUNGS: respirations non-labored   ABD: soft, nondistended, nontender   EXT: moves all extremities, no edema      Recent Results (from the past 24 hour(s))   Hemoglobin & Hematocrit, Blood    Collection Time: 24  6:45 PM    Specimen: Hand, Left; Blood   Result Value Ref Range    Hemoglobin 8.8 (L) 12.0 - 15.9 g/dL    Hematocrit 27.0 (L) 34.0 - 46.6 %   Prepare RBC, 2 Units    Collection Time: 24  2:16 AM   Result Value Ref Range    Product Code D8520E73     Unit Number [id number]     UNIT  ABO O     UNIT  RH POS     Crossmatch Interpretation Compatible     Dispense Status PT     Blood Expiration Date 967510666440     Blood Type Barcode 5100     Product Code N2402D48     Unit Number [id number]     UNIT   ABO O     UNIT  RH POS     Crossmatch Interpretation Compatible     Dispense Status PT     Blood Expiration Date 318246045124     Blood Type Barcode 5100          Assessment:    Lipedema    Localized edema    Pain in both lower extremities    Difficulty walking    Acute blood loss as cause of postoperative anemia      3 Days Post-Op Procedure(s) (LRB):  LIPOSUCTION, Left Lower Extremity Circumferential Liposuction with Skin Resection and negative pressure dressing (Left)    Plan:  Doing well, she can get out of bed today with PT. NWB to the left lower extremity. If she does ok, we will remove her deleon.   Raffaele Willams MD    4/22/2024

## 2024-04-22 NOTE — PLAN OF CARE
Problem: Adult Inpatient Plan of Care  Goal: Plan of Care Review  Outcome: Ongoing, Progressing  Flowsheets (Taken 4/22/2024 1518)  Outcome Evaluation: Pain controlled on current regimen. Up with PT services. Vital signs stable.  Goal: Patient-Specific Goal (Individualized)  Outcome: Ongoing, Progressing  Goal: Absence of Hospital-Acquired Illness or Injury  Outcome: Ongoing, Progressing  Intervention: Identify and Manage Fall Risk  Recent Flowsheet Documentation  Taken 4/22/2024 0813 by Angelique Phelan RN  Safety Promotion/Fall Prevention: safety round/check completed  Intervention: Prevent Skin Injury  Recent Flowsheet Documentation  Taken 4/22/2024 0813 by Angelique Phelan RN  Body Position: position changed independently  Intervention: Prevent and Manage VTE (Venous Thromboembolism) Risk  Recent Flowsheet Documentation  Taken 4/22/2024 0813 by Angelique Phelan RN  Activity Management: bedrest  Goal: Optimal Comfort and Wellbeing  Outcome: Ongoing, Progressing  Intervention: Provide Person-Centered Care  Recent Flowsheet Documentation  Taken 4/22/2024 0813 by Angelique Phelan RN  Trust Relationship/Rapport:   care explained   choices provided  Goal: Readiness for Transition of Care  Outcome: Ongoing, Progressing     Problem: Pain Acute  Goal: Acceptable Pain Control and Functional Ability  Outcome: Ongoing, Progressing     Problem: Bleeding (Surgery Nonspecified)  Goal: Absence of Bleeding  Outcome: Ongoing, Progressing     Problem: Bowel Motility Impaired (Surgery Nonspecified)  Goal: Effective Bowel Elimination  Outcome: Ongoing, Progressing     Problem: Fluid and Electrolyte Imbalance (Surgery Nonspecified)  Goal: Fluid and Electrolyte Balance  Outcome: Ongoing, Progressing     Problem: Glycemic Control Impaired (Surgery Nonspecified)  Goal: Blood Glucose Level Within Targeted Range  Outcome: Ongoing, Progressing     Problem: Infection (Surgery Nonspecified)  Goal: Absence of Infection Signs and  Symptoms  Outcome: Ongoing, Progressing     Problem: Ongoing Anesthesia Effects (Surgery Nonspecified)  Goal: Anesthesia/Sedation Recovery  Outcome: Ongoing, Progressing  Intervention: Optimize Anesthesia Recovery  Recent Flowsheet Documentation  Taken 4/22/2024 0813 by Angelique Phelan RN  Safety Promotion/Fall Prevention: safety round/check completed  Administration (IS): instruction provided, follow-up     Problem: Pain (Surgery Nonspecified)  Goal: Acceptable Pain Control  Outcome: Ongoing, Progressing     Problem: Postoperative Nausea and Vomiting (Surgery Nonspecified)  Goal: Nausea and Vomiting Relief  Outcome: Ongoing, Progressing     Problem: Postoperative Urinary Retention (Surgery Nonspecified)  Goal: Effective Urinary Elimination  Outcome: Ongoing, Progressing     Problem: Respiratory Compromise (Surgery Nonspecified)  Goal: Effective Oxygenation and Ventilation  Outcome: Ongoing, Progressing     Problem: Skin Injury Risk Increased  Goal: Skin Health and Integrity  Outcome: Ongoing, Progressing   Goal Outcome Evaluation:              Outcome Evaluation: Pain controlled on current regimen. Up with PT services. Vital signs stable.

## 2024-04-23 ENCOUNTER — TELEPHONE (OUTPATIENT)
Dept: PLASTIC SURGERY | Facility: CLINIC | Age: 44
End: 2024-04-23

## 2024-04-23 LAB
DEPRECATED RDW RBC AUTO: 45.6 FL (ref 37–54)
ERYTHROCYTE [DISTWIDTH] IN BLOOD BY AUTOMATED COUNT: 13.2 % (ref 12.3–15.4)
HCT VFR BLD AUTO: 27.2 % (ref 34–46.6)
HGB BLD-MCNC: 8.9 G/DL (ref 12–15.9)
HOLD SPECIMEN: NORMAL
MCH RBC QN AUTO: 30.8 PG (ref 26.6–33)
MCHC RBC AUTO-ENTMCNC: 32.7 G/DL (ref 31.5–35.7)
MCV RBC AUTO: 94.1 FL (ref 79–97)
PLATELET # BLD AUTO: 174 10*3/MM3 (ref 140–450)
PMV BLD AUTO: 12.4 FL (ref 6–12)
RBC # BLD AUTO: 2.89 10*6/MM3 (ref 3.77–5.28)
WBC NRBC COR # BLD AUTO: 6.87 10*3/MM3 (ref 3.4–10.8)

## 2024-04-23 PROCEDURE — 97116 GAIT TRAINING THERAPY: CPT

## 2024-04-23 PROCEDURE — 85027 COMPLETE CBC AUTOMATED: CPT | Performed by: SURGERY

## 2024-04-23 PROCEDURE — 97110 THERAPEUTIC EXERCISES: CPT

## 2024-04-23 RX ADMIN — NAPROXEN 250 MG: 250 TABLET ORAL at 21:10

## 2024-04-23 RX ADMIN — DEXTROSE AND SODIUM CHLORIDE 125 ML/HR: 5; 450 INJECTION, SOLUTION INTRAVENOUS at 06:27

## 2024-04-23 RX ADMIN — ACETAMINOPHEN 1000 MG: 500 TABLET ORAL at 18:45

## 2024-04-23 RX ADMIN — SENNOSIDES AND DOCUSATE SODIUM 2 TABLET: 50; 8.6 TABLET ORAL at 08:13

## 2024-04-23 RX ADMIN — NAPROXEN 250 MG: 250 TABLET ORAL at 08:13

## 2024-04-23 NOTE — PLAN OF CARE
Goal Outcome Evaluation:              Outcome Evaluation: Resting in bed. No significant changes. Increased activity

## 2024-04-23 NOTE — PROGRESS NOTES
PLASTIC SURGERY PROGRESS NOTE    Patient Identification:  Name: Angelique Cordon    Age: 44 y.o.    Sex: female   :  1980  MRN: [id number]               Subjective:  No acute events.    Objective:    Continuous Infusions:dextrose 5 % and sodium chloride 0.45 %, 125 mL/hr, Last Rate: 125 mL/hr (24 0627)        Scheduled Meds:naproxen, 250 mg, Oral, BID      PRN Meds:  acetaminophen **OR** acetaminophen    acetaminophen    HYDROmorphone **AND** naloxone    ondansetron ODT **OR** ondansetron    ondansetron ODT    oxyCODONE-acetaminophen    senna-docusate sodium    Vital signs in last 24 hours:  Vitals:    24 1949 24 2245 24 0328 24 0753   BP: 107/56 102/49 109/53 117/62   BP Location: Left arm Left arm Left arm Left arm   Patient Position: Lying Lying Lying Lying   Pulse: 103 98 95 87   Resp: 18 18 18    Temp: 98.2 °F (36.8 °C) 98.8 °F (37.1 °C) 98.8 °F (37.1 °C) 98.2 °F (36.8 °C)   TempSrc: Oral Oral Oral Oral   SpO2: 97% 97% 97% 100%   Weight:       Height:           Intake/Output:I/O last 3 completed shifts:  In: 4254 [P.O.:1320; I.V.:2934]  Out: 4705 [Urine:4400; Drains:5]    Exam:  GEN: no acute distress, resting quietly   HEENT: NCAT, EOMI, MMM   HEART: normal rate, regular rhythm   LUNGS: respirations non-labored   ABD: soft, nondistended, nontender   EXT: moves all extremities, no edema      Recent Results (from the past 24 hour(s))   CBC (No Diff)    Collection Time: 24  3:50 AM    Specimen: Blood   Result Value Ref Range    WBC 6.87 3.40 - 10.80 10*3/mm3    RBC 2.89 (L) 3.77 - 5.28 10*6/mm3    Hemoglobin 8.9 (L) 12.0 - 15.9 g/dL    Hematocrit 27.2 (L) 34.0 - 46.6 %    MCV 94.1 79.0 - 97.0 fL    MCH 30.8 26.6 - 33.0 pg    MCHC 32.7 31.5 - 35.7 g/dL    RDW 13.2 12.3 - 15.4 %    RDW-SD 45.6 37.0 - 54.0 fl    MPV 12.4 (H) 6.0 - 12.0 fL    Platelets 174 140 - 450 10*3/mm3   Green Top (Gel)    Collection Time: 24  3:50 AM   Result Value Ref Range    Extra Tube Hold  for add-ons.          Assessment:    Lipedema    Localized edema    Pain in both lower extremities    Difficulty walking    Acute blood loss as cause of postoperative anemia      4 Days Post-Op Procedure(s) (LRB):  LIPOSUCTION, Left Lower Extremity Circumferential Liposuction with Skin Resection and negative pressure dressing (Left)    Plan:  Doing well, hb is stable. I will dc iv fluids today   Raffaele Willams MD    4/23/2024

## 2024-04-23 NOTE — TELEPHONE ENCOUNTER
[facility] received a fax that requires your attention. The document has been indexed to the patient’s chart for your review.      Reason for sending: [facility] FOR INPATIENT ADMISSION.     Documents Description: EXT MED REC_ [employer]_ 04-20-24    Name of Sender: [employer]     Date Indexed: 04/23/24    Notes (if needed):

## 2024-04-23 NOTE — THERAPY TREATMENT NOTE
Acute Care - Physical Therapy Treatment Note   Narinder     Patient Name: Angelique Cordon  : 1980  MRN: [id number]  Today's Date: 2024      Visit Dx:     ICD-10-CM ICD-9-CM   1. Difficulty walking  R26.2 719.7     Patient Active Problem List   Diagnosis    Annual physical exam    Depression    Sebaceous cyst    Varicose veins of both lower extremities with pain    Lipedema    Localized edema    Pain in both lower extremities    Difficulty walking    Anxiety    Overweight (BMI 25.0-29.9)    Bleeding    Surgical wound dehiscence    Postoperative follow-up    Anemia    Acute blood loss as cause of postoperative anemia     Past Medical History:   Diagnosis Date    Abnormal bone density screening     Anxiety     History of transfusion     Lipedema of lower extremity     LEGS AND ARMS    Pap smear for cervical cancer screening 2018    Varicose vein of leg     Visit for screening mammogram      Past Surgical History:   Procedure Laterality Date    EXCISION LESION Left 2022    Procedure: EXCISION CYST LEFT AXILLARY;  Surgeon: Rosalie Talley MD;  Location: [facility];  Service: General;  Laterality: Left;    FOOT SURGERY Left  HAD 3 DIFFERENT SURGERIES    L FOOT/TENDON RELEASE PLANTAR FASIA    LIPOSUCTION Right 10/24/2023    Procedure: LIPOSUCTION, Right Lower Extremity Circumferential Liposuction with Skin Resection and negative pressure dressing;  Surgeon: Raffaele Willams MD;  Location: [facility];  Service: Plastics;  Laterality: Right;    LIPOSUCTION Left 2024    Procedure: LIPOSUCTION, Left Lower Extremity Circumferential Liposuction with Skin Resection and negative pressure dressing;  Surgeon: Raffaele Willams MD;  Location: [facility];  Service: Plastics;  Laterality: Left;     PT Assessment (Last 12 Hours)       PT Evaluation and Treatment       Row Name 24 1309          Physical Therapy Time and Intention    Subjective Information no complaints  -DK      Document Type therapy note (daily note)  -DK     Mode of Treatment individual therapy;physical therapy  -DK     Patient Effort good  -DK     Symptoms Noted During/After Treatment increased pain  -DK     Comment Pt reports little/no pain and was able to ambulate a short distance this session.  -       Row Name 04/23/24 1309          Pain    Pretreatment Pain Rating 0/10 - no pain  -DK     Posttreatment Pain Rating 2/10  -DK     Pain Location - Side/Orientation Left  -DK     Pain Location generalized  -DK     Pain Location - hip;knee  -DK     Pain Intervention(s) Repositioned;Ambulation/increased activity;Distraction;Therapeutic presence  -       Row Name 04/23/24 1309          Cognition    Affect/Mental Status (Cognition) WNL  -DK     Orientation Status (Cognition) oriented x 4  -DK     Follows Commands (Cognition) WNL  -DK     Cognitive Function WNL  -DK     Personal Safety Interventions gait belt;nonskid shoes/slippers when out of bed;supervised activity  -       Row Name 04/23/24 1309          Mobility    Extremity Weight-bearing Status left lower extremity  -DK     Left Lower Extremity (Weight-bearing Status) non weight-bearing (NWB)  -       Row Name 04/23/24 1309          Bed Mobility    Bed Mobility supine-sit-supine  -DK     All Activities, [place] (Bed Mobility) supervision  -     Supine-Sit-Supine [place] (Bed Mobility) supervision  -DK     Assistive Device (Bed Mobility) bed rails  -       Row Name 04/23/24 1309          Transfers    Transfers sit-stand transfer;stand-sit transfer  -       Row Name 04/23/24 1309          Sit-Stand Transfer    Sit-Stand [place] (Transfers) standby assist  -DK     Assistive Device (Sit-Stand Transfers) walker, front-wheeled  -       Row Name 04/23/24 1309          Stand-Sit Transfer    Stand-Sit [place] (Transfers) standby assist  -DK     Assistive Device (Stand-Sit Transfers) walker, front-wheeled  -       Row Name 04/23/24 1309           Gait/Stairs (Locomotion)    Gait/Stairs Locomotion gait/ambulation independence;gait/ambulation assistive device;distance ambulated;gait pattern  -DK     [place] Level (Gait) standby assist;contact guard;1 person assist  -DK     Assistive Device (Gait) walker, front-wheeled  -DK     Distance in Feet (Gait) 20  -DK     Pattern (Gait) step-to  -DK     Deviations/Abnormal Patterns (Gait) pedro decreased;festinating/shuffling;gait speed decreased;stride length decreased  -DK     Comment, (Gait/Stairs) Pt ambulated on room air with a rolling walker and a wound vac.  Pt was able to maintain NWB restrictions throughout gait.  She returned to bed post treatment.  -       Row Name 04/23/24 1309          Safety Issues, Functional Mobility    Impairments Affecting Function (Mobility) endurance/activity tolerance;range of motion (ROM);strength  -       Row Name 04/23/24 1309          Balance    Balance Assessment sitting static balance;sitting dynamic balance;standing static balance;standing dynamic balance  -     Static Sitting Balance standby assist  -     Dynamic Sitting Balance standby assist  -DK     Position, Sitting Balance unsupported;sitting edge of bed  -     Static Standing Balance standby assist  -DK     Dynamic Standing Balance standby assist;contact guard;1-person assist  -DK     Position/Device Used, Standing Balance walker, front-wheeled  -DK     Balance Interventions standing;dynamic;tandem gait  -       Row Name 04/23/24 1309          Motor Skills    Motor Skills --  therapeutic exercises  -     Therapeutic Exercise hip;knee;ankle  -       Row Name 04/23/24 1309          Hip (Therapeutic Exercise)    Hip (Therapeutic Exercise) AROM (active range of motion)  -     Hip AROM (Therapeutic Exercise) bilateral;flexion;extension;aBduction;aDduction;sitting;20 repititions  -       Row Name 04/23/24 1309          Knee (Therapeutic Exercise)    Knee (Therapeutic Exercise) AROM (active  range of motion)  -DK     Knee AROM (Therapeutic Exercise) bilateral;flexion;extension;LAQ (long arc quad);sitting;20 repititions  -DK       Row Name 04/23/24 1309          Ankle (Therapeutic Exercise)    Ankle (Therapeutic Exercise) AROM (active range of motion)  -DK     Ankle AROM (Therapeutic Exercise) bilateral;dorsiflexion;plantarflexion;sitting;20 repititions  -DK       Row Name             Wound 04/19/24 Left posterior thigh Incision    Wound - Properties Group Placement Date: 04/19/24  - Present on Original Admission: N  -JH Side: Left  -JH Orientation: posterior  -JH Location: thigh  -JH Primary Wound Type: Incision  -JH    Retired Wound - Properties Group Placement Date: 04/19/24  - Present on Original Admission: N  -JH Side: Left  -JH Orientation: posterior  -JH Location: thigh  -JH Primary Wound Type: Incision  -JH    Retired Wound - Properties Group Date first assessed: 04/19/24  - Present on Original Admission: N  -JH Side: Left  -JH Location: thigh  -JH Primary Wound Type: Incision  -JH      Row Name             Wound 04/19/24 Left calf Incision    Wound - Properties Group Placement Date: 04/19/24  - Present on Original Admission: N  -JH Side: Left  -JH Location: calf  -JH Primary Wound Type: Incision  -JH    Retired Wound - Properties Group Placement Date: 04/19/24  - Present on Original Admission: N  -JH Side: Left  -JH Location: calf  -JH Primary Wound Type: Incision  -JH    Retired Wound - Properties Group Date first assessed: 04/19/24  - Present on Original Admission: N  -JH Side: Left  -JH Location: calf  -JH Primary Wound Type: Incision  -JH      Row Name             Wound 04/19/24 Left posterior thigh Incision    Wound - Properties Group Placement Date: 04/19/24  - Present on Original Admission: N  -JH Side: Left  -JH Orientation: posterior  -JH Location: thigh  -JH Primary Wound Type: Incision  -JH    Retired Wound - Properties Group Placement Date: 04/19/24  - Present on  Original Admission: N  -JH Side: Left  -JH Orientation: posterior  -JH Location: thigh  -JH Primary Wound Type: Incision  -JH    Retired Wound - Properties Group Date first assessed: 04/19/24  -JH Present on Original Admission: N  -JH Side: Left  -JH Location: thigh  -JH Primary Wound Type: Incision  -JH      Row Name             Wound 04/19/24 Left anterior thigh Incision    Wound - Properties Group Placement Date: 04/19/24  -JH Side: Left  -JH Orientation: anterior  -JH Location: thigh  -JH Primary Wound Type: Incision  -JH    Retired Wound - Properties Group Placement Date: 04/19/24  -JH Side: Left  -JH Orientation: anterior  -JH Location: thigh  -JH Primary Wound Type: Incision  -JH    Retired Wound - Properties Group Date first assessed: 04/19/24  -JH Side: Left  -JH Location: thigh  -JH Primary Wound Type: Incision  -JH      Row Name             Wound 04/19/24 Left distal leg Incision    Wound - Properties Group Placement Date: 04/19/24  -JH Present on Original Admission: N  -JH Side: Left  -JH Orientation: distal  -JH Location: leg  -JH Primary Wound Type: Incision  -JH    Retired Wound - Properties Group Placement Date: 04/19/24  -JH Present on Original Admission: N  -JH Side: Left  -JH Orientation: distal  -JH Location: leg  -JH Primary Wound Type: Incision  -JH    Retired Wound - Properties Group Date first assessed: 04/19/24  -JH Present on Original Admission: N  -JH Side: Left  -JH Location: leg  -JH Primary Wound Type: Incision  -JH      Row Name             NPWT (Negative Pressure Wound Therapy) 04/19/24 left thigh    NPWT (Negative Pressure Wound Therapy) - Properties Group Placement Date: 04/19/24  -LD Location: left thigh  -LD Additional Comments: wound vac placed in PACU  -LD    Retired NPWT (Negative Pressure Wound Therapy) - Properties Group Placement Date: 04/19/24  -LD Location: left thigh  -LD Additional Comments: wound vac placed in PACU  -LD    Retired NPWT (Negative Pressure Wound Therapy)  - Properties Group Placement Date: 04/19/24  -LD Location: left thigh  -LD Additional Comments: wound vac placed in PACU  -LD      Row Name 04/23/24 1309          Plan of Care Review    Plan of Care Reviewed With patient  -DK     Progress improving  -DK       Row Name 04/23/24 1309          Positioning and Restraints    Pre-Treatment Position in bed  -DK     Post Treatment Position bed  -DK     In Bed supine;call light within reach;encouraged to call for assist;side rails up x2;legs elevated  -DK       Row Name 04/23/24 1309          Therapy Assessment/Plan (PT)    Rehab Potential (PT) good, to achieve stated therapy goals  -DK     Criteria for Skilled Interventions Met (PT) skilled treatment is necessary  -DK     Therapy Frequency (PT) daily  -DK     Problem List (PT) problems related to;balance;mobility;range of motion (ROM);strength;pain  -DK     Activity Limitations Related to Problem List (PT) unable to ambulate safely;unable to transfer safely  -DK       Row Name 04/23/24 1309          Progress Summary (PT)    Progress Toward Functional Goals (PT) progress toward functional goals is good  -DK               User Key  (r) = Recorded By, (t) = Taken By, (c) = Cosigned By      Initials Name Provider Type    Soco Schreiber, RN Registered Nurse    Wilda Nuno PTA Physical Therapist Assistant    Saida Thorpe RN Registered Nurse                    Physical Therapy Education       Title: PT OT SLP Therapies (In Progress)       Topic: Physical Therapy (In Progress)       Point: Mobility training (Done)       Learning Progress Summary             Patient Acceptance, E,TB, VU by AV at 4/22/2024 1349                         Point: Home exercise program (Not Started)       Learner Progress:  Not documented in this visit.              Point: Body mechanics (Done)       Learning Progress Summary             Patient Acceptance, E,TB, VU by AV at 4/22/2024 1349                         Point: Precautions (Done)        Learning Progress Summary             Patient Acceptance, E,TB, VU by AV at 4/22/2024 1349                                         User Key       Initials Effective Dates Name Provider Type Discipline    AV 06/11/21 -  Adama Lantigua, PT Physical Therapist PT                  PT Recommendation and Plan  Planned Therapy Interventions (PT): balance training, bed mobility training, gait training, home exercise program, strengthening, transfer training  Therapy Frequency (PT): daily  Progress Summary (PT)  Progress Toward Functional Goals (PT): progress toward functional goals is good  Plan of Care Reviewed With: patient  Progress: improving   Outcome Measures       Row Name 04/23/24 1309 04/22/24 1300          How much help from another person do you currently need...    Turning from your back to your side while in flat bed without using bedrails? 4  -DK 4  -AV     Moving from lying on back to sitting on the side of a flat bed without bedrails? 4  -DK 3  -AV     Moving to and from a bed to a chair (including a wheelchair)? 3  -DK 3  -AV     Standing up from a chair using your arms (e.g., wheelchair, bedside chair)? 4  -DK 3  -AV     Climbing 3-5 steps with a railing? 3  -DK 3  -AV     To walk in hospital room? 3  -DK 3  -AV     AM-PAC 6 Clicks Score (PT) 21  -DK 19  -AV     Highest Level of Mobility Goal 6 --> Walk 10 steps or more  -DK 6 --> Walk 10 steps or more  -AV        Functional Assessment    Outcome Measure Options AM-PAC 6 Clicks Basic Mobility (PT)  -DK AM-PAC 6 Clicks Basic Mobility (PT)  -AV               User Key  (r) = Recorded By, (t) = Taken By, (c) = Cosigned By      Initials Name Provider Type    Wilda Nuno, PTA Physical Therapist Assistant    AV Adama Lantigua, PT Physical Therapist                     Time Calculation:    PT Charges       Row Name 04/23/24 1314             Time Calculation    PT Received On 04/23/24  -WILDER      PT Goal Re-Cert Due Date 05/01/24  -WILDER         Timed  Charges    21733 - PT Therapeutic Exercise Minutes 12  -DK      09729 - Gait Training Minutes  6  -DK      53141 - PT Therapeutic Activity Minutes 6  -DK         Total Minutes    Timed Charges Total Minutes 24  -DK       Total Minutes 24  -DK                User Key  (r) = Recorded By, (t) = Taken By, (c) = Cosigned By      Initials Name Provider Type    Wilda Nuno PTA Physical Therapist Assistant                  Therapy Charges for Today       Code Description Service Date Service Provider Modifiers Qty    60511965673 HC PT THER PROC EA 15 MIN 4/23/2024 Wilda Ro PTA GP 1    60200109401 HC GAIT TRAINING EA 15 MIN 4/23/2024 Wilda Ro PTA GP 1            PT G-Codes  Outcome Measure Options: AM-PAC 6 Clicks Basic Mobility (PT)  AM-PAC 6 Clicks Score (PT): 21    Wilda Ro PTA  4/23/2024

## 2024-04-23 NOTE — PLAN OF CARE
Goal Outcome Evaluation:  Plan of Care Reviewed With: patient        Progress: improving  Outcome Evaluation: No significant changes overnight. Hgb 8.9 with am labs. VSS. States she is feeling better. Soco Aguirre RN

## 2024-04-24 PROCEDURE — 97116 GAIT TRAINING THERAPY: CPT

## 2024-04-24 PROCEDURE — 97110 THERAPEUTIC EXERCISES: CPT

## 2024-04-24 RX ADMIN — NAPROXEN 250 MG: 250 TABLET ORAL at 22:08

## 2024-04-24 RX ADMIN — ACETAMINOPHEN 1000 MG: 500 TABLET ORAL at 13:25

## 2024-04-24 RX ADMIN — NAPROXEN 250 MG: 250 TABLET ORAL at 08:10

## 2024-04-24 RX ADMIN — OXYCODONE HYDROCHLORIDE AND ACETAMINOPHEN 1 TABLET: 5; 325 TABLET ORAL at 13:57

## 2024-04-24 NOTE — THERAPY TREATMENT NOTE
Acute Care - Physical Therapy Treatment Note   Narinder     Patient Name: Angelique Cordon  : 1980  MRN: [id number]  Today's Date: 2024      Visit Dx:     ICD-10-CM ICD-9-CM   1. Difficulty walking  R26.2 719.7     Patient Active Problem List   Diagnosis    Annual physical exam    Depression    Sebaceous cyst    Varicose veins of both lower extremities with pain    Lipedema    Localized edema    Pain in both lower extremities    Difficulty walking    Anxiety    Overweight (BMI 25.0-29.9)    Bleeding    Surgical wound dehiscence    Postoperative follow-up    Anemia    Acute blood loss as cause of postoperative anemia     Past Medical History:   Diagnosis Date    Abnormal bone density screening     Anxiety     History of transfusion     Lipedema of lower extremity     LEGS AND ARMS    Pap smear for cervical cancer screening 2018    Varicose vein of leg     Visit for screening mammogram      Past Surgical History:   Procedure Laterality Date    EXCISION LESION Left 2022    Procedure: EXCISION CYST LEFT AXILLARY;  Surgeon: Rosalie Talley MD;  Location: [facility];  Service: General;  Laterality: Left;    FOOT SURGERY Left  HAD 3 DIFFERENT SURGERIES    L FOOT/TENDON RELEASE PLANTAR FASIA    LIPOSUCTION Right 10/24/2023    Procedure: LIPOSUCTION, Right Lower Extremity Circumferential Liposuction with Skin Resection and negative pressure dressing;  Surgeon: Raffaele Willams MD;  Location: [facility];  Service: Plastics;  Laterality: Right;    LIPOSUCTION Left 2024    Procedure: LIPOSUCTION, Left Lower Extremity Circumferential Liposuction with Skin Resection and negative pressure dressing;  Surgeon: Raffaele Willams MD;  Location: [facility];  Service: Plastics;  Laterality: Left;     PT Assessment (Last 12 Hours)       PT Evaluation and Treatment       Row Name 24 1442          Physical Therapy Time and Intention    Subjective Information complains  of;weakness;fatigue;pain  -DK     Document Type therapy note (daily note)  -DK     Mode of Treatment individual therapy;physical therapy  -DK     Patient Effort good  -DK     Symptoms Noted During/After Treatment fatigue;increased pain  -       Row Name 04/24/24 1442          Pain    Pretreatment Pain Rating 0/10 - no pain  -DK     Posttreatment Pain Rating 2/10  -DK     Pain Location - Side/Orientation Left  -DK     Pain Location generalized  -DK     Pain Location - hip;knee;foot  -DK     Pain Intervention(s) Repositioned;Ambulation/increased activity;Distraction;Therapeutic presence  -       Row Name 04/24/24 1442          Cognition    Affect/Mental Status (Cognition) WNL  -DK     Orientation Status (Cognition) oriented x 4  -DK     Follows Commands (Cognition) WNL  -DK     Cognitive Function WNL  -DK     Personal Safety Interventions gait belt;nonskid shoes/slippers when out of bed;supervised activity  -       Row Name 04/24/24 1442          Mobility    Extremity Weight-bearing Status left lower extremity  -DK     Left Lower Extremity (Weight-bearing Status) non weight-bearing (NWB)  -       Row Name 04/24/24 1442          Bed Mobility    Bed Mobility supine-sit-supine  -DK     All Activities, [place] (Bed Mobility) supervision  -     Supine-Sit-Supine [place] (Bed Mobility) supervision  -DK     Assistive Device (Bed Mobility) bed rails  -       Row Name 04/24/24 1442          Transfers    Transfers sit-stand transfer;stand-sit transfer  -       Row Name 04/24/24 1442          Sit-Stand Transfer    Sit-Stand [place] (Transfers) standby assist  -     Assistive Device (Sit-Stand Transfers) walker, front-wheeled  -       Row Name 04/24/24 1442          Stand-Sit Transfer    Stand-Sit [place] (Transfers) standby assist  -     Assistive Device (Stand-Sit Transfers) walker, front-wheeled  -       Row Name 04/24/24 1442          Gait/Stairs (Locomotion)    Gait/Stairs Locomotion  gait/ambulation independence;gait/ambulation assistive device;distance ambulated;gait pattern  -DK     [place] Level (Gait) standby assist;contact guard;1 person assist  -DK     Assistive Device (Gait) walker, front-wheeled  -     Distance in Feet (Gait) 80  -DK     Pattern (Gait) step-to  -DK     Deviations/Abnormal Patterns (Gait) pedro decreased;festinating/shuffling;gait speed decreased;stride length decreased  -DK     Comment, (Gait/Stairs) Pt ambulated on room air with a rolling walker, with the wound vac.  She returned to bed post treatment. Pt maintained NWB LLE throughout gait.  -       Row Name 04/24/24 1442          Safety Issues, Functional Mobility    Impairments Affecting Function (Mobility) endurance/activity tolerance;range of motion (ROM);strength  -       Row Name 04/24/24 1442          Balance    Balance Assessment sitting static balance;sitting dynamic balance;standing static balance;standing dynamic balance  -DK     Static Sitting Balance standby assist  -DK     Dynamic Sitting Balance standby assist  -DK     Position, Sitting Balance unsupported;sitting edge of bed  -DK     Static Standing Balance standby assist  -DK     Dynamic Standing Balance standby assist  -DK     Position/Device Used, Standing Balance walker, front-wheeled  -DK     Balance Interventions standing;dynamic;tandem gait  -       Row Name 04/24/24 1442          Motor Skills    Motor Skills --  therapeutic exercises  -     Therapeutic Exercise hip;knee;ankle  -       Row Name 04/24/24 1442          Hip (Therapeutic Exercise)    Hip (Therapeutic Exercise) AROM (active range of motion)  -     Hip AROM (Therapeutic Exercise) bilateral;flexion;extension;aBduction;aDduction;sitting;supine;20 repititions  -       Row Name 04/24/24 1442          Knee (Therapeutic Exercise)    Knee (Therapeutic Exercise) AROM (active range of motion)  -     Knee AROM (Therapeutic Exercise) bilateral;flexion;extension;LAQ (long  arc quad);SLR (straight leg raise);sitting;supine;20 repititions  -DK       Row Name 04/24/24 1442          Ankle (Therapeutic Exercise)    Ankle (Therapeutic Exercise) AROM (active range of motion)  -DK     Ankle AROM (Therapeutic Exercise) bilateral;dorsiflexion;plantarflexion;sitting;20 repititions  -DK       Row Name             Wound 04/19/24 Left posterior thigh Incision    Wound - Properties Group Placement Date: 04/19/24  - Present on Original Admission: N  -JH Side: Left  -JH Orientation: posterior  -JH Location: thigh  -JH Primary Wound Type: Incision  -JH    Retired Wound - Properties Group Placement Date: 04/19/24  - Present on Original Admission: N  -JH Side: Left  -JH Orientation: posterior  -JH Location: thigh  -JH Primary Wound Type: Incision  -JH    Retired Wound - Properties Group Date first assessed: 04/19/24  - Present on Original Admission: N  -JH Side: Left  -JH Location: thigh  -JH Primary Wound Type: Incision  -JH      Row Name             Wound 04/19/24 Left calf Incision    Wound - Properties Group Placement Date: 04/19/24  - Present on Original Admission: N  -JH Side: Left  -JH Location: calf  -JH Primary Wound Type: Incision  -JH    Retired Wound - Properties Group Placement Date: 04/19/24  - Present on Original Admission: N  -JH Side: Left  -JH Location: calf  -JH Primary Wound Type: Incision  -JH    Retired Wound - Properties Group Date first assessed: 04/19/24  - Present on Original Admission: N  -JH Side: Left  -JH Location: calf  -JH Primary Wound Type: Incision  -JH      Row Name             Wound 04/19/24 Left posterior thigh Incision    Wound - Properties Group Placement Date: 04/19/24  - Present on Original Admission: N  -JH Side: Left  -JH Orientation: posterior  -JH Location: thigh  -JH Primary Wound Type: Incision  -JH    Retired Wound - Properties Group Placement Date: 04/19/24  - Present on Original Admission: N  -JH Side: Left  -JH Orientation: posterior   -JH Location: thigh  -JH Primary Wound Type: Incision  -JH    Retired Wound - Properties Group Date first assessed: 04/19/24  -JH Present on Original Admission: N  -JH Side: Left  -JH Location: thigh  -JH Primary Wound Type: Incision  -JH      Row Name             Wound 04/19/24 Left anterior thigh Incision    Wound - Properties Group Placement Date: 04/19/24  -JH Side: Left  -JH Orientation: anterior  -JH Location: thigh  -JH Primary Wound Type: Incision  -JH    Retired Wound - Properties Group Placement Date: 04/19/24  -JH Side: Left  -JH Orientation: anterior  -JH Location: thigh  -JH Primary Wound Type: Incision  -JH    Retired Wound - Properties Group Date first assessed: 04/19/24  -JH Side: Left  -JH Location: thigh  -JH Primary Wound Type: Incision  -JH      Row Name             Wound 04/19/24 Left distal leg Incision    Wound - Properties Group Placement Date: 04/19/24  -JH Present on Original Admission: N  -JH Side: Left  -JH Orientation: distal  -JH Location: leg  -JH Primary Wound Type: Incision  -JH    Retired Wound - Properties Group Placement Date: 04/19/24  -JH Present on Original Admission: N  -JH Side: Left  -JH Orientation: distal  -JH Location: leg  -JH Primary Wound Type: Incision  -JH    Retired Wound - Properties Group Date first assessed: 04/19/24  -JH Present on Original Admission: N  -JH Side: Left  -JH Location: leg  -JH Primary Wound Type: Incision  -JH      Row Name             NPWT (Negative Pressure Wound Therapy) 04/19/24 left thigh    NPWT (Negative Pressure Wound Therapy) - Properties Group Placement Date: 04/19/24  -LD Location: left thigh  -LD Additional Comments: wound vac placed in PACU  -LD    Retired NPWT (Negative Pressure Wound Therapy) - Properties Group Placement Date: 04/19/24  -LD Location: left thigh  -LD Additional Comments: wound vac placed in PACU  -LD    Retired NPWT (Negative Pressure Wound Therapy) - Properties Group Placement Date: 04/19/24  -LD Location: left  thigh  -LD Additional Comments: wound vac placed in PACU  -LD      Row Name 04/24/24 1442          Plan of Care Review    Plan of Care Reviewed With patient  -DK     Progress improving  -DK       Row Name 04/24/24 1442          Positioning and Restraints    Pre-Treatment Position in bed  -DK     Post Treatment Position bed  -DK     In Bed supine;call light within reach;encouraged to call for assist;side rails up x2;LLE elevated  -DK       Row Name 04/24/24 1442          Therapy Assessment/Plan (PT)    Rehab Potential (PT) good, to achieve stated therapy goals  -DK     Criteria for Skilled Interventions Met (PT) skilled treatment is necessary  -DK     Therapy Frequency (PT) daily  -DK     Problem List (PT) problems related to;balance;mobility;range of motion (ROM);strength;pain  -DK     Activity Limitations Related to Problem List (PT) unable to ambulate safely;unable to transfer safely  -DK       Row Name 04/24/24 1442          Progress Summary (PT)    Progress Toward Functional Goals (PT) progress toward functional goals is good  -DK               User Key  (r) = Recorded By, (t) = Taken By, (c) = Cosigned By      Initials Name Provider Type    Soco Schreiber, RN Registered Nurse    Wilda Nuno, CHARLIE Physical Therapist Assistant    Saida Thorpe RN Registered Nurse                    Physical Therapy Education       Title: PT OT SLP Therapies (In Progress)       Topic: Physical Therapy (In Progress)       Point: Mobility training (Done)       Learning Progress Summary             Patient Acceptance, E,TB, VU by AV at 4/22/2024 1349                         Point: Home exercise program (Not Started)       Learner Progress:  Not documented in this visit.              Point: Body mechanics (Done)       Learning Progress Summary             Patient Acceptance, E,TB, VU by AV at 4/22/2024 1349                         Point: Precautions (Done)       Learning Progress Summary             Patient Acceptance, E,TB,  VU by MADONNA at 4/22/2024 1349                                         User Key       Initials Effective Dates Name Provider Type Discipline    AV 06/11/21 -  Adama Lantigua, GREG Physical Therapist PT                  PT Recommendation and Plan  Planned Therapy Interventions (PT): balance training, bed mobility training, gait training, home exercise program, strengthening, transfer training  Therapy Frequency (PT): daily  Progress Summary (PT)  Progress Toward Functional Goals (PT): progress toward functional goals is good  Plan of Care Reviewed With: patient  Progress: improving   Outcome Measures       Row Name 04/24/24 1442 04/23/24 1309 04/22/24 1300       How much help from another person do you currently need...    Turning from your back to your side while in flat bed without using bedrails? 4  -DK 4  -DK 4  -AV    Moving from lying on back to sitting on the side of a flat bed without bedrails? 4  -DK 4  -DK 3  -AV    Moving to and from a bed to a chair (including a wheelchair)? 3  -DK 3  -DK 3  -AV    Standing up from a chair using your arms (e.g., wheelchair, bedside chair)? 4  -DK 4  -DK 3  -AV    Climbing 3-5 steps with a railing? 3  -DK 3  -DK 3  -AV    To walk in hospital room? 3  -DK 3  -DK 3  -AV    AM-PAC 6 Clicks Score (PT) 21  -DK 21  -DK 19  -AV    Highest Level of Mobility Goal 6 --> Walk 10 steps or more  -DK 6 --> Walk 10 steps or more  -DK 6 --> Walk 10 steps or more  -AV       Functional Assessment    Outcome Measure Options AM-PAC 6 Clicks Basic Mobility (PT)  -DK AM-PAC 6 Clicks Basic Mobility (PT)  -DK AM-PAC 6 Clicks Basic Mobility (PT)  -AV              User Key  (r) = Recorded By, (t) = Taken By, (c) = Cosigned By      Initials Name Provider Type    Wilda Nuno, PTA Physical Therapist Assistant    Adama Hernandez, GREG Physical Therapist                     Time Calculation:    PT Charges       Row Name 04/24/24 1445             Time Calculation    PT Received On 04/24/24  -WILDER       PT Goal Re-Cert Due Date 05/01/24  -DK         Timed Charges    19858 - PT Therapeutic Exercise Minutes 15  -DK      82235 - Gait Training Minutes  8  -DK      51695 - PT Therapeutic Activity Minutes 8  -DK         Total Minutes    Timed Charges Total Minutes 31  -DK       Total Minutes 31  -DK                User Key  (r) = Recorded By, (t) = Taken By, (c) = Cosigned By      Initials Name Provider Type    Wilda Nuno PTA Physical Therapist Assistant                  Therapy Charges for Today       Code Description Service Date Service Provider Modifiers Qty    45181559543 HC PT THER PROC EA 15 MIN 4/23/2024 Wilda Ro, PTA GP 1    16906877803 HC GAIT TRAINING EA 15 MIN 4/23/2024 Wilda Ro, PTA GP 1    68732487788 HC PT THER PROC EA 15 MIN 4/24/2024 Wilda Ro, PTA GP 1    37994066343 HC GAIT TRAINING EA 15 MIN 4/24/2024 Wilda Ro, PTA GP 1            PT G-Codes  Outcome Measure Options: AM-PAC 6 Clicks Basic Mobility (PT)  AM-PAC 6 Clicks Score (PT): 21    Wilda Ro PTA  4/24/2024

## 2024-04-24 NOTE — PLAN OF CARE
Goal Outcome Evaluation:         VSS. UOP. Up ad jerald. WND Vac in place. Pain controlled per patient; see MAR.

## 2024-04-24 NOTE — PLAN OF CARE
Goal Outcome Evaluation:  Plan of Care Reviewed With: patient        Progress: improving  Outcome Evaluation: No significant changes overnight. VSS. She is increasing her activity and reports minimal pain. Wound vac remains in place. Soco Aguirre RN

## 2024-04-24 NOTE — PROGRESS NOTES
PLASTIC SURGERY PROGRESS NOTE    Patient Identification:  Name: Angelique Cordon    Age: 44 y.o.    Sex: female   :  1980  MRN: [id number]               Subjective:  No acute events.    Objective:    Continuous Infusions:     Scheduled Meds:naproxen, 250 mg, Oral, BID      PRN Meds:  acetaminophen **OR** acetaminophen    acetaminophen    HYDROmorphone **AND** naloxone    ondansetron ODT **OR** ondansetron    ondansetron ODT    oxyCODONE-acetaminophen    senna-docusate sodium    Vital signs in last 24 hours:  Vitals:    24 1520 24 1945 24 2320 24 0325   BP: 102/62 101/43 108/55 100/50   BP Location:  Left arm Left arm Left arm   Patient Position:  Lying Lying Lying   Pulse: 107 95 90 92   Resp:  18 18 18   Temp: 98.6 °F (37 °C) 98.8 °F (37.1 °C) 98.8 °F (37.1 °C) 97.9 °F (36.6 °C)   TempSrc: Oral Oral Oral Oral   SpO2: 99% 97% 98% 97%   Weight:       Height:           Intake/Output:I/O last 3 completed shifts:  In:  [I.V.:]  Out: 1520 [Urine:1500; Drains:20]    Exam:  GEN: no acute distress, resting quietly   HEENT: NCAT, EOMI, MMM   HEART: normal rate, regular rhythm   LUNGS: respirations non-labored   ABD: soft, nondistended, nontender   EXT: moves all extremities, no edema      No results found for this or any previous visit (from the past 24 hour(s)).      Assessment:    Lipedema    Localized edema    Pain in both lower extremities    Difficulty walking    Acute blood loss as cause of postoperative anemia      5 Days Post-Op Procedure(s) (LRB):  LIPOSUCTION, Left Lower Extremity Circumferential Liposuction with Skin Resection and negative pressure dressing (Left)    Plan:  Doing well. Plan to be dc home on Friday after removal of wound vac   Raffaele Willams MD    2024

## 2024-04-25 ENCOUNTER — TELEPHONE (OUTPATIENT)
Dept: PLASTIC SURGERY | Facility: CLINIC | Age: 44
End: 2024-04-25
Payer: OTHER GOVERNMENT

## 2024-04-25 ENCOUNTER — TELEPHONE (OUTPATIENT)
Dept: PLASTIC SURGERY | Facility: CLINIC | Age: 44
End: 2024-04-25

## 2024-04-25 PROCEDURE — 97530 THERAPEUTIC ACTIVITIES: CPT

## 2024-04-25 PROCEDURE — 97116 GAIT TRAINING THERAPY: CPT

## 2024-04-25 RX ORDER — OXYCODONE HYDROCHLORIDE AND ACETAMINOPHEN 5; 325 MG/1; MG/1
1 TABLET ORAL EVERY 4 HOURS PRN
Status: DISCONTINUED | OUTPATIENT
Start: 2024-04-25 | End: 2024-04-26 | Stop reason: HOSPADM

## 2024-04-25 RX ORDER — OXYCODONE HYDROCHLORIDE AND ACETAMINOPHEN 5; 325 MG/1; MG/1
1 TABLET ORAL EVERY 6 HOURS PRN
Qty: 18 TABLET | Refills: 0 | Status: SHIPPED | OUTPATIENT
Start: 2024-04-25

## 2024-04-25 RX ADMIN — ACETAMINOPHEN 1000 MG: 500 TABLET ORAL at 13:11

## 2024-04-25 RX ADMIN — NAPROXEN 250 MG: 250 TABLET ORAL at 22:21

## 2024-04-25 RX ADMIN — ACETAMINOPHEN 1000 MG: 500 TABLET ORAL at 19:37

## 2024-04-25 RX ADMIN — NAPROXEN 250 MG: 250 TABLET ORAL at 08:18

## 2024-04-25 RX ADMIN — OXYCODONE HYDROCHLORIDE AND ACETAMINOPHEN 1 TABLET: 5; 325 TABLET ORAL at 14:22

## 2024-04-25 NOTE — DISCHARGE INSTRUCTIONS
Ok for regular diet  Do not drive for next 2 weeks  Ok to get out of bed and shower however, try to keep left leg straight most of the time. Avoid bearing weight on that leg.   Do not exercise for the next 6 weeks.  Do not fly for 2 months    Take post-operative medications as directed on the bottle.     If you experience any issues or concerns, please contact the office at [phone]. If after hours a call service will notify the on-call provider.

## 2024-04-25 NOTE — TELEPHONE ENCOUNTER
Spoke with Kris which is this patient's nurse for today. She is wanting to know if Dr. Willams can call in some pain meds for her to administer today. She is in moderate pain. Dr. Willams is sending this in now

## 2024-04-25 NOTE — PROGRESS NOTES
PLASTIC SURGERY PROGRESS NOTE    Patient Identification:  Name: Angelique Cordon    Age: 44 y.o.    Sex: female   :  1980  MRN: [id number]               Subjective:  No acute events.    Objective:    Continuous Infusions:     Scheduled Meds:naproxen, 250 mg, Oral, BID      PRN Meds:  acetaminophen **OR** acetaminophen    acetaminophen    [] HYDROmorphone **AND** naloxone    ondansetron ODT **OR** ondansetron    ondansetron ODT    senna-docusate sodium    Vital signs in last 24 hours:  Vitals:    24 1524 24 1900 24 2200 24 0700   BP: 102/65 102/57 115/60 106/62   BP Location: Left arm Left arm Left arm    Patient Position: Lying Lying Lying    Pulse: 99 101 99 107   Resp: 18 20 20 16   Temp: 98.2 °F (36.8 °C) 98.8 °F (37.1 °C) 98.8 °F (37.1 °C) 99 °F (37.2 °C)   TempSrc: Oral Oral Oral    SpO2: 98% 98% 99% 96%   Weight:       Height:           Intake/Output:I/O last 3 completed shifts:  In: 720 [P.O.:720]  Out: 430 [Urine:400; Drains:30]    Exam:  GEN: no acute distress, resting quietly   HEENT: NCAT, EOMI, MMM   HEART: normal rate, regular rhythm   LUNGS: respirations non-labored   ABD: soft, nondistended, nontender   EXT: moves all extremities,ischemic changes to the left lower leg       No results found for this or any previous visit (from the past 24 hour(s)).      Assessment:    Lipedema    Localized edema    Pain in both lower extremities    Difficulty walking    Acute blood loss as cause of postoperative anemia      6 Days Post-Op Procedure(s) (LRB):  LIPOSUCTION, Left Lower Extremity Circumferential Liposuction with Skin Resection and negative pressure dressing (Left)    Plan:  Doing well, home tomorrow after wound vac in dcd.   Raffaele Willams MD    2024

## 2024-04-25 NOTE — PLAN OF CARE
Goal Outcome Evaluation:      VSS. UOP. Pain controlled per patient; see MAR. Wnd vac in place. Ambulated in wolfe x1.

## 2024-04-25 NOTE — PLAN OF CARE
Goal Outcome Evaluation:  Plan of Care Reviewed With: patient        Progress: no change  Outcome Evaluation: No significant changes overnight. Wound vac remains in place. VSS. She is looking forward to DC tomorrow. Soco Aguirre RN

## 2024-04-25 NOTE — THERAPY TREATMENT NOTE
Acute Care - Physical Therapy Treatment Note   [facility]     Patient Name: Angelique Cordon  : 1980  MRN: [id number]  Today's Date: 2024      Visit Dx:     ICD-10-CM ICD-9-CM   1. Difficulty walking  R26.2 719.7   2. Lipedema  R60.9 782.3     Patient Active Problem List   Diagnosis    Annual physical exam    Depression    Sebaceous cyst    Varicose veins of both lower extremities with pain    Lipedema    Localized edema    Pain in both lower extremities    Difficulty walking    Anxiety    Overweight (BMI 25.0-29.9)    Bleeding    Surgical wound dehiscence    Postoperative follow-up    Anemia    Acute blood loss as cause of postoperative anemia     Past Medical History:   Diagnosis Date    Abnormal bone density screening     Anxiety     History of transfusion     Lipedema of lower extremity     LEGS AND ARMS    Pap smear for cervical cancer screening 2018    Varicose vein of leg     Visit for screening mammogram      Past Surgical History:   Procedure Laterality Date    EXCISION LESION Left 2022    Procedure: EXCISION CYST LEFT AXILLARY;  Surgeon: Rosalie Talley MD;  Location: [facility];  Service: General;  Laterality: Left;    FOOT SURGERY Left  HAD 3 DIFFERENT SURGERIES    L FOOT/TENDON RELEASE PLANTAR FASIA    LIPOSUCTION Right 10/24/2023    Procedure: LIPOSUCTION, Right Lower Extremity Circumferential Liposuction with Skin Resection and negative pressure dressing;  Surgeon: Raffaele Willams MD;  Location: [facility];  Service: Plastics;  Laterality: Right;    LIPOSUCTION Left 2024    Procedure: LIPOSUCTION, Left Lower Extremity Circumferential Liposuction with Skin Resection and negative pressure dressing;  Surgeon: Raffaele Willams MD;  Location: [facility];  Service: Plastics;  Laterality: Left;     PT Assessment (Last 12 Hours)       PT Evaluation and Treatment       Row Name 24 1226 24 1200       Physical Therapy Time and Intention     Subjective Information complains of;weakness;fatigue;pain  -DK --    Document Type therapy note (daily note)  -DK therapy note (daily note)  -DK    Mode of Treatment individual therapy;physical therapy  -DK individual therapy;physical therapy  -DK    Patient Effort good  -DK good  -DK    Symptoms Noted During/After Treatment fatigue;dizziness  -DK --    Comment Pt reports starting to get flushed / woozy last night, and while getting up this morning.  Pt did well initially with gait, but became rather fatigue/flushed/woozy, requiring a chair for a sitting rest break.  RN was advised. BP was WNL. Pt was able to ambulate the remainder of distance to bed.  Exercises deferred this session.  -DK --      Row Name 04/25/24 1226 04/25/24 1200       Pain    Pretreatment Pain Rating 0/10 - no pain  -DK 0/10 - no pain  -DK    Posttreatment Pain Rating 2/10  -DK 2/10  -DK    Pain Location - Side/Orientation Left  -DK --    Pain Location generalized  -DK --    Pain Location - hip;knee;foot  -DK --    Pain Intervention(s) Repositioned;Ambulation/increased activity;Distraction;Therapeutic presence  -DK --      Row Name 04/25/24 1226 04/25/24 1200       Cognition    Affect/Mental Status (Cognition) WNL  -DK WNL  -DK    Orientation Status (Cognition) oriented x 4  -DK oriented x 4  -DK    Follows Commands (Cognition) WNL  -DK WNL  -DK    Cognitive Function WNL  -DK WNL  -DK    Personal Safety Interventions gait belt;nonskid shoes/slippers when out of bed;supervised activity  -DK --      Row Name 04/25/24 1226 04/25/24 1200       Mobility    Extremity Weight-bearing Status left lower extremity  -DK left lower extremity  -DK    Left Lower Extremity (Weight-bearing Status) non weight-bearing (NWB)  -DK non weight-bearing (NWB)  -DK      Row Name 04/25/24 1226 04/25/24 1200       Bed Mobility    Bed Mobility supine-sit-supine  -DK supine-sit-supine  -DK    All Activities, Mayes (Bed Mobility) supervision  -DK supervision  -DK     Supine-Sit-Supine Seibert (Bed Mobility) supervision  -DK supervision  -DK    Assistive Device (Bed Mobility) bed rails  -DK bed rails  -DK      Row Name 04/25/24 1226 04/25/24 1200       Transfers    Transfers sit-stand transfer;stand-sit transfer  -DK sit-stand transfer;stand-sit transfer  -DK      Row Name 04/25/24 1226 04/25/24 1200       Sit-Stand Transfer    Sit-Stand Seibert (Transfers) standby assist  -DK standby assist  -DK    Assistive Device (Sit-Stand Transfers) walker, front-wheeled  -DK walker, front-wheeled  -DK      Row Name 04/25/24 1226 04/25/24 1200       Stand-Sit Transfer    Stand-Sit Seibert (Transfers) standby assist  -DK standby assist  -DK    Assistive Device (Stand-Sit Transfers) walker, front-wheeled  -DK walker, front-wheeled  -DK      Row Name 04/25/24 1226 04/25/24 1200       Gait/Stairs (Locomotion)    Gait/Stairs Locomotion gait/ambulation independence;gait/ambulation assistive device;distance ambulated;gait pattern  -DK gait/ambulation independence;gait/ambulation assistive device;distance ambulated;gait pattern  -DK    Seibert Level (Gait) standby assist;contact guard;1 person assist  -DK standby assist;contact guard;1 person assist  -DK    Assistive Device (Gait) walker, front-wheeled  -DK walker, front-wheeled  -DK    Distance in Feet (Gait) 80  -DK --    Pattern (Gait) step-to  -DK step-to  -DK    Deviations/Abnormal Patterns (Gait) pedro decreased;festinating/shuffling;gait speed decreased;stride length decreased  -DK pedro decreased;festinating/shuffling;gait speed decreased;stride length decreased  -DK    Comment, (Gait/Stairs) Pt ambulated the first 50' without issue, then began c/o fatigue/flushed/woozy feeling.  She required a sitting rest break for 5-6 minutes.  RN was advised, BP was taken and was fine.  Pt felt fine at that point and was able to ambulate back to bed without further issues. Pt ambulated with a rolling walker, on room air, with the  LLE wound vac in place.  -DK --      Row Name 04/25/24 1226 04/25/24 1200       Safety Issues, Functional Mobility    Impairments Affecting Function (Mobility) endurance/activity tolerance;range of motion (ROM);strength  -DK endurance/activity tolerance;range of motion (ROM);strength  -DK      Row Name 04/25/24 1226          Balance    Balance Assessment sitting static balance;sitting dynamic balance;standing static balance;standing dynamic balance  -DK     Static Sitting Balance independent  -DK     Dynamic Sitting Balance independent  -DK     Position, Sitting Balance unsupported;sitting edge of bed  -DK     Static Standing Balance standby assist  -DK     Dynamic Standing Balance standby assist;contact guard;1-person assist  -DK     Position/Device Used, Standing Balance walker, front-wheeled  -DK     Balance Interventions standing;dynamic;tandem gait  -DK       Row Name             Wound 04/19/24 Left posterior thigh Incision    Wound - Properties Group Placement Date: 04/19/24  - Present on Original Admission: N  -JH Side: Left  -JH Orientation: posterior  -JH Location: thigh  -JH Primary Wound Type: Incision  -JH    Retired Wound - Properties Group Placement Date: 04/19/24  - Present on Original Admission: N  -JH Side: Left  -JH Orientation: posterior  -JH Location: thigh  -JH Primary Wound Type: Incision  -JH    Retired Wound - Properties Group Date first assessed: 04/19/24  - Present on Original Admission: N  -JH Side: Left  -JH Location: thigh  -JH Primary Wound Type: Incision  -JH      Row Name             Wound 04/19/24 Left calf Incision    Wound - Properties Group Placement Date: 04/19/24  - Present on Original Admission: N  -JH Side: Left  -JH Location: calf  -JH Primary Wound Type: Incision  -JH    Retired Wound - Properties Group Placement Date: 04/19/24  - Present on Original Admission: N  -JH Side: Left  -JH Location: calf  -JH Primary Wound Type: Incision  -JH    Retired Wound -  Properties Group Date first assessed: 04/19/24  -JH Present on Original Admission: N  -JH Side: Left  -JH Location: calf  -JH Primary Wound Type: Incision  -JH      Row Name             Wound 04/19/24 Left posterior thigh Incision    Wound - Properties Group Placement Date: 04/19/24  -JH Present on Original Admission: N  -JH Side: Left  -JH Orientation: posterior  -JH Location: thigh  -JH Primary Wound Type: Incision  -JH    Retired Wound - Properties Group Placement Date: 04/19/24  -JH Present on Original Admission: N  -JH Side: Left  -JH Orientation: posterior  -JH Location: thigh  -JH Primary Wound Type: Incision  -JH    Retired Wound - Properties Group Date first assessed: 04/19/24  -JH Present on Original Admission: N  -JH Side: Left  -JH Location: thigh  -JH Primary Wound Type: Incision  -JH      Row Name             Wound 04/19/24 Left anterior thigh Incision    Wound - Properties Group Placement Date: 04/19/24  -JH Side: Left  -JH Orientation: anterior  -JH Location: thigh  -JH Primary Wound Type: Incision  -JH    Retired Wound - Properties Group Placement Date: 04/19/24  -JH Side: Left  -JH Orientation: anterior  -JH Location: thigh  -JH Primary Wound Type: Incision  -JH    Retired Wound - Properties Group Date first assessed: 04/19/24  -JH Side: Left  -JH Location: thigh  -JH Primary Wound Type: Incision  -JH      Row Name             Wound 04/19/24 Left distal leg Incision    Wound - Properties Group Placement Date: 04/19/24  -JH Present on Original Admission: N  -JH Side: Left  -JH Orientation: distal  -JH Location: leg  -JH Primary Wound Type: Incision  -JH    Retired Wound - Properties Group Placement Date: 04/19/24  -JH Present on Original Admission: N  -JH Side: Left  -JH Orientation: distal  -JH Location: leg  -JH Primary Wound Type: Incision  -JH    Retired Wound - Properties Group Date first assessed: 04/19/24  -JH Present on Original Admission: N  -JH Side: Left  -JH Location: leg  -JH Primary  Wound Type: Incision  -JH      Row Name             NPWT (Negative Pressure Wound Therapy) 04/19/24 left thigh    NPWT (Negative Pressure Wound Therapy) - Properties Group Placement Date: 04/19/24  -LD Location: left thigh  -LD Additional Comments: wound vac placed in PACU  -LD    Retired NPWT (Negative Pressure Wound Therapy) - Properties Group Placement Date: 04/19/24  -LD Location: left thigh  -LD Additional Comments: wound vac placed in PACU  -LD    Retired NPWT (Negative Pressure Wound Therapy) - Properties Group Placement Date: 04/19/24  -LD Location: left thigh  -LD Additional Comments: wound vac placed in PACU  -LD      Row Name 04/25/24 1226          Plan of Care Review    Plan of Care Reviewed With patient  -DK     Progress no change  -DK       Row Name 04/25/24 1226          Positioning and Restraints    Pre-Treatment Position in bed  -DK     Post Treatment Position bed  -DK     In Bed supine;call light within reach;encouraged to call for assist;side rails up x2;LLE elevated  -DK       Row Name 04/25/24 1226 04/25/24 1200       Therapy Assessment/Plan (PT)    Rehab Potential (PT) good, to achieve stated therapy goals  -DK good, to achieve stated therapy goals  -DK    Criteria for Skilled Interventions Met (PT) skilled treatment is necessary  -DK skilled treatment is necessary  -DK    Therapy Frequency (PT) daily  -DK daily  -DK    Problem List (PT) problems related to;balance;mobility;range of motion (ROM);strength;pain  -DK problems related to;balance;mobility;range of motion (ROM);strength;pain  -DK    Activity Limitations Related to Problem List (PT) unable to ambulate safely;unable to transfer safely  -DK unable to ambulate safely;unable to transfer safely  -DK      Row Name 04/25/24 1226          Progress Summary (PT)    Progress Toward Functional Goals (PT) progress toward functional goals is good  -DK               User Key  (r) = Recorded By, (t) = Taken By, (c) = Cosigned By      Initials Name  Provider Type    Soco Schreiber, RN Registered Nurse    Wilda Nuno, CHARLIE Physical Therapist Assistant    Saida Thorpe RN Registered Nurse                    Physical Therapy Education       Title: PT OT SLP Therapies (In Progress)       Topic: Physical Therapy (In Progress)       Point: Mobility training (Done)       Learning Progress Summary             Patient Acceptance, E,TB, VU by AV at 4/22/2024 1349                         Point: Home exercise program (Not Started)       Learner Progress:  Not documented in this visit.              Point: Body mechanics (Done)       Learning Progress Summary             Patient Acceptance, E,TB, VU by AV at 4/22/2024 1349                         Point: Precautions (Done)       Learning Progress Summary             Patient Acceptance, E,TB, VU by AV at 4/22/2024 1349                                         User Key       Initials Effective Dates Name Provider Type Discipline     06/11/21 -  Adama Lantigua, PT Physical Therapist PT                  PT Recommendation and Plan  Planned Therapy Interventions (PT): balance training, bed mobility training, gait training, home exercise program, strengthening, transfer training  Therapy Frequency (PT): daily  Progress Summary (PT)  Progress Toward Functional Goals (PT): progress toward functional goals is good  Plan of Care Reviewed With: patient  Progress: no change   Outcome Measures       Row Name 04/25/24 1226 04/24/24 1442 04/23/24 1309       How much help from another person do you currently need...    Turning from your back to your side while in flat bed without using bedrails? 4  -DK 4  -DK 4  -DK    Moving from lying on back to sitting on the side of a flat bed without bedrails? 4  -DK 4  -DK 4  -DK    Moving to and from a bed to a chair (including a wheelchair)? 3  -DK 3  -DK 3  -DK    Standing up from a chair using your arms (e.g., wheelchair, bedside chair)? 4  -DK 4  -DK 4  -DK    Climbing 3-5 steps with  a railing? 3  -DK 3  -DK 3  -DK    To walk in hospital room? 3  -DK 3  -DK 3  -DK    AM-PAC 6 Clicks Score (PT) 21  -DK 21  -DK 21  -DK    Highest Level of Mobility Goal 6 --> Walk 10 steps or more  -DK 6 --> Walk 10 steps or more  -DK 6 --> Walk 10 steps or more  -DK       Functional Assessment    Outcome Measure Options AM-PAC 6 Clicks Basic Mobility (PT)  -DK AM-PAC 6 Clicks Basic Mobility (PT)  -DK AM-PAC 6 Clicks Basic Mobility (PT)  -DK      Row Name 04/22/24 1300             How much help from another person do you currently need...    Turning from your back to your side while in flat bed without using bedrails? 4  -AV      Moving from lying on back to sitting on the side of a flat bed without bedrails? 3  -AV      Moving to and from a bed to a chair (including a wheelchair)? 3  -AV      Standing up from a chair using your arms (e.g., wheelchair, bedside chair)? 3  -AV      Climbing 3-5 steps with a railing? 3  -AV      To walk in hospital room? 3  -AV      AM-PAC 6 Clicks Score (PT) 19  -AV      Highest Level of Mobility Goal 6 --> Walk 10 steps or more  -AV         Functional Assessment    Outcome Measure Options AM-PAC 6 Clicks Basic Mobility (PT)  -AV                User Key  (r) = Recorded By, (t) = Taken By, (c) = Cosigned By      Initials Name Provider Type    Wilda Nuno PTA Physical Therapist Assistant    AV Adama Lantigua, PT Physical Therapist                     Time Calculation:    PT Charges       Row Name 04/25/24 1232             Time Calculation    PT Received On 04/25/24  -DK      PT Goal Re-Cert Due Date 05/01/24  -DK         Timed Charges    60861 - Gait Training Minutes  9  -DK      82184 - PT Therapeutic Activity Minutes 14  -DK         Total Minutes    Timed Charges Total Minutes 23  -DK       Total Minutes 23  -DK                User Key  (r) = Recorded By, (t) = Taken By, (c) = Cosigned By      Initials Name Provider Type    Wilda Nuno PTA Physical Therapist  Assistant                  Therapy Charges for Today       Code Description Service Date Service Provider Modifiers Qty    47581537955 HC PT THER PROC EA 15 MIN 4/24/2024 Wilda Ro, PTA GP 1    19102184954 HC GAIT TRAINING EA 15 MIN 4/24/2024 Wilda Ro, PTA GP 1    23622976341 HC GAIT TRAINING EA 15 MIN 4/25/2024 Wilda Ro, PTA GP 1    54231265101 HC PT THERAPEUTIC ACT EA 15 MIN 4/25/2024 Wilda Ro, PTA GP 1            PT G-Codes  Outcome Measure Options: AM-PAC 6 Clicks Basic Mobility (PT)  AM-PAC 6 Clicks Score (PT): 21    Wilda Ro PTA  4/25/2024

## 2024-04-25 NOTE — TELEPHONE ENCOUNTER
Hospital staff called the office and I spoke with the patients nurse for today. She stated the patients pain medicine needs to be resubmitted, I verbally spoke with Dr. Willams about this matter.

## 2024-04-26 ENCOUNTER — READMISSION MANAGEMENT (OUTPATIENT)
Dept: CALL CENTER | Facility: HOSPITAL | Age: 44
End: 2024-04-26
Payer: OTHER GOVERNMENT

## 2024-04-26 VITALS
WEIGHT: 150.13 LBS | OXYGEN SATURATION: 99 % | RESPIRATION RATE: 16 BRPM | BODY MASS INDEX: 29.48 KG/M2 | SYSTOLIC BLOOD PRESSURE: 108 MMHG | HEART RATE: 100 BPM | TEMPERATURE: 98.8 F | DIASTOLIC BLOOD PRESSURE: 64 MMHG | HEIGHT: 60 IN

## 2024-04-26 PROCEDURE — 25010000002 HYDROMORPHONE 1 MG/ML SOLUTION: Performed by: SURGERY

## 2024-04-26 RX ADMIN — HYDROMORPHONE HYDROCHLORIDE 0.5 MG: 1 INJECTION, SOLUTION INTRAMUSCULAR; INTRAVENOUS; SUBCUTANEOUS at 06:36

## 2024-04-26 NOTE — PLAN OF CARE
Goal Outcome Evaluation:  Plan of Care Reviewed With: patient        Progress: improving  Outcome Evaluation: SHIFT UNEVENTFUL. WOUND VAC REMOVED THIS AM BY DR HUNTLEY.  Esther Vaz RN

## 2024-04-26 NOTE — DISCHARGE SUMMARY
Physician Discharge Summary  Patient Identification:  Name: Angelique Cordon  Age: 44 y.o.  Sex: female  :  1980  MRN: [id number]    Admit date: 2024    Discharge date and time: 2024    Admitting Physician: Raffaele Willams MD     Discharge Physician: Raffaele Willams MD    Admission Diagnoses: Difficulty walking [R26.2]  Pain in both lower extremities [M79.604, M79.605]  Localized edema [R60.0]  Lipedema [R60.9]  Acute blood loss as cause of postoperative anemia [D62]    Discharge Diagnoses: Difficulty walking [R26.2]  Pain in both lower extremities [M79.604, M79.605]  Localized edema [R60.0]  Lipedema [R60.9]  Acute blood loss as cause of postoperative anemia [D62]    Discharged Condition: good    Admission HPI:    Procedures:  Procedure(s) (LRB):  LIPOSUCTION, Left Lower Extremity Circumferential Liposuction with Skin Resection and negative pressure dressing (Left)    Hospital Course:   The pt was brought to the OR on 2024 for the above procedure. Patient  tolerated the procedure well, see dictated operative note for detailed summary. She has received 2 units of PRBC and her hb and vitals have  been stable  At the time of discharge patient was tolerating a regular diet and having bowel movements. Also, at the time of discharge  pain was controlled on oral medication and patient was ambulating without assistance.     Consults:   None    Significant Diagnostic Studies: labs and radiology    Discharge Exam:  GEN: no acute distress, resting quietly   HEENT: NCAT, EOMI, MMM   HEART: normal rate, regular rhythm   LUNGS: respirations non-labored   ABD: soft, nondistended, nontender   EXT: moves all extremities, no edema    Disposition:  Home    Patient Instructions:   [unfilled]   Follow-up Information       Hannah Parrish, APRN .    Specialties: Nurse Practitioner, Family Medicine  Contact information:  [address]  [place] [address]  [place] [address]  [phone]                                  Lipedema    Localized edema    Pain in both lower extremities    Difficulty walking    Acute blood loss as cause of postoperative anemia      Signed:  Raffaele Ross MD, PhD  Plastic and reconstructive surgery   4/26/2024

## 2024-04-26 NOTE — PROGRESS NOTES
PLASTIC SURGERY PROGRESS NOTE    Patient Identification:  Name: Angelique Cordon    Age: 44 y.o.    Sex: female   :  1980  MRN: [id number]               Subjective:  No acute events.    Objective:    Continuous Infusions:     Scheduled Meds:naproxen, 250 mg, Oral, BID      PRN Meds:  acetaminophen **OR** acetaminophen    acetaminophen    [] HYDROmorphone **AND** naloxone    ondansetron ODT **OR** ondansetron    ondansetron ODT    oxyCODONE-acetaminophen    senna-docusate sodium    Vital signs in last 24 hours:  Vitals:    24 1500 24 1900 24 2208 24 0500   BP: 104/62 111/51 111/54 102/56   BP Location:  Left arm Left arm Left arm   Patient Position:  Lying Lying Lying   Pulse: 114 110 104 115   Resp:    Temp: 99 °F (37.2 °C) 99.3 °F (37.4 °C) 100 °F (37.8 °C) 99 °F (37.2 °C)   TempSrc:  Oral Oral Oral   SpO2: 97% 100% 98% 97%   Weight:       Height:           Intake/Output:I/O last 3 completed shifts:  In: 1565 [P.O.:1565]  Out: 410 [Urine:400; Drains:10]    Exam:  GEN: no acute distress, resting quietly   HEENT: NCAT, EOMI, MMM   HEART: normal rate, regular rhythm   LUNGS: respirations non-labored   ABD: soft, nondistended, nontender   EXT: moves all extremities, no edema      No results found for this or any previous visit (from the past 24 hour(s)).      Assessment:    Lipedema    Localized edema    Pain in both lower extremities    Difficulty walking    Acute blood loss as cause of postoperative anemia      7 Days Post-Op Procedure(s) (LRB):  LIPOSUCTION, Left Lower Extremity Circumferential Liposuction with Skin Resection and negative pressure dressing (Left)    Plan:  Doing well, plan to go home today   Raffaele Willams MD    2024

## 2024-04-29 ENCOUNTER — TRANSITIONAL CARE MANAGEMENT TELEPHONE ENCOUNTER (OUTPATIENT)
Dept: CALL CENTER | Facility: HOSPITAL | Age: 44
End: 2024-04-29
Payer: OTHER GOVERNMENT

## 2024-04-29 NOTE — OUTREACH NOTE
Call Center TCM Note      Flowsheet Row Responses   [facility], [place], operated by [facility] facility patient discharged from? [facility]   Does the patient have one of the following disease processes/diagnoses(primary or secondary)? General Surgery   TCM attempt successful? No   Unsuccessful attempts Attempt 1            Rody Pagan LPN    4/29/2024, 12:33 EDT

## 2024-04-29 NOTE — OUTREACH NOTE
Call Center TCM Note      Flowsheet Row Responses   [facility], [place], operated by [facility] facility patient discharged from? [facility]   Does the patient have one of the following disease processes/diagnoses(primary or secondary)? General Surgery   TCM attempt successful? No   Unsuccessful attempts Attempt 2            Rody Pagan LPN    4/29/2024, 15:59 EDT

## 2024-04-30 ENCOUNTER — TRANSITIONAL CARE MANAGEMENT TELEPHONE ENCOUNTER (OUTPATIENT)
Dept: CALL CENTER | Facility: HOSPITAL | Age: 44
End: 2024-04-30
Payer: OTHER GOVERNMENT

## 2024-04-30 NOTE — OUTREACH NOTE
Call Center TCM Note      Flowsheet Row Responses   [facility] patient discharged from? [facility]   Does the patient have one of the following disease processes/diagnoses(primary or secondary)? General Surgery   TCM attempt successful? No   Unsuccessful attempts Attempt 3  [Attempted to reach patient and , listed on PCP verbal release. No answer.]            Jailyn Collier RN    4/30/2024, 11:29 EDT

## 2024-05-06 ENCOUNTER — OFFICE VISIT (OUTPATIENT)
Dept: PLASTIC SURGERY | Facility: CLINIC | Age: 44
End: 2024-05-06
Payer: OTHER GOVERNMENT

## 2024-05-06 VITALS
HEART RATE: 102 BPM | HEIGHT: 60 IN | OXYGEN SATURATION: 95 % | BODY MASS INDEX: 28.07 KG/M2 | DIASTOLIC BLOOD PRESSURE: 75 MMHG | SYSTOLIC BLOOD PRESSURE: 131 MMHG | WEIGHT: 143 LBS

## 2024-05-06 DIAGNOSIS — R60.9 LIPEDEMA: Primary | ICD-10-CM

## 2024-05-06 PROCEDURE — 99024 POSTOP FOLLOW-UP VISIT: CPT | Performed by: SURGERY

## 2024-05-08 ENCOUNTER — OFFICE VISIT (OUTPATIENT)
Dept: PLASTIC SURGERY | Facility: CLINIC | Age: 44
End: 2024-05-08
Payer: OTHER GOVERNMENT

## 2024-05-08 VITALS
WEIGHT: 140.8 LBS | SYSTOLIC BLOOD PRESSURE: 111 MMHG | DIASTOLIC BLOOD PRESSURE: 73 MMHG | HEIGHT: 60 IN | OXYGEN SATURATION: 94 % | BODY MASS INDEX: 27.64 KG/M2 | HEART RATE: 98 BPM

## 2024-05-08 DIAGNOSIS — R60.9 LIPEDEMA: Primary | ICD-10-CM

## 2024-05-08 PROBLEM — T81.31XA SURGICAL WOUND DEHISCENCE: Status: RESOLVED | Noted: 2023-10-30 | Resolved: 2024-05-08

## 2024-05-08 PROCEDURE — 99024 POSTOP FOLLOW-UP VISIT: CPT | Performed by: SURGERY

## 2024-05-13 ENCOUNTER — PREP FOR SURGERY (OUTPATIENT)
Dept: OTHER | Facility: HOSPITAL | Age: 44
End: 2024-05-13
Payer: OTHER GOVERNMENT

## 2024-05-13 DIAGNOSIS — M79.604 PAIN IN BOTH LOWER EXTREMITIES: ICD-10-CM

## 2024-05-13 DIAGNOSIS — M79.605 PAIN IN BOTH LOWER EXTREMITIES: ICD-10-CM

## 2024-05-13 DIAGNOSIS — R60.0 LOCALIZED EDEMA: ICD-10-CM

## 2024-05-13 DIAGNOSIS — R60.9 LIPEDEMA: Primary | ICD-10-CM

## 2024-05-13 DIAGNOSIS — R26.2 DIFFICULTY WALKING: ICD-10-CM

## 2024-05-13 RX ORDER — ACETAMINOPHEN 500 MG
1000 TABLET ORAL ONCE
OUTPATIENT
Start: 2024-05-13 | End: 2024-05-13

## 2024-05-13 RX ORDER — SCOLOPAMINE TRANSDERMAL SYSTEM 1 MG/1
1 PATCH, EXTENDED RELEASE TRANSDERMAL CONTINUOUS
OUTPATIENT
Start: 2024-05-13 | End: 2024-05-16

## 2024-05-14 ENCOUNTER — OFFICE VISIT (OUTPATIENT)
Dept: PLASTIC SURGERY | Facility: CLINIC | Age: 44
End: 2024-05-14
Payer: OTHER GOVERNMENT

## 2024-05-14 VITALS
OXYGEN SATURATION: 98 % | HEART RATE: 71 BPM | BODY MASS INDEX: 27.37 KG/M2 | HEIGHT: 60 IN | WEIGHT: 139.4 LBS | SYSTOLIC BLOOD PRESSURE: 119 MMHG | DIASTOLIC BLOOD PRESSURE: 84 MMHG

## 2024-05-14 DIAGNOSIS — R60.9 LIPEDEMA: ICD-10-CM

## 2024-05-14 DIAGNOSIS — Z09 POSTOPERATIVE FOLLOW-UP: Primary | ICD-10-CM

## 2024-05-14 DIAGNOSIS — T14.8XXA HEMATOMA: ICD-10-CM

## 2024-05-14 PROCEDURE — 10160 PNXR ASPIR ABSC HMTMA BULLA: CPT | Performed by: NURSE PRACTITIONER

## 2024-05-14 PROCEDURE — 99024 POSTOP FOLLOW-UP VISIT: CPT | Performed by: NURSE PRACTITIONER

## 2024-05-14 NOTE — PROGRESS NOTES
"Chief Complaint  Post-op Follow-up (Fluid check)    Subjective  I just felt the fluid build up again        History of Present Illness  Angelique Cordon is a 44 y.o. female who presents to [facility] for Postoperative Follow-Up of LIPOSUCTION, Left Lower Extremity Circumferential Liposuction with Skin Resection and negative pressure dressing on 4/19/24.    Pt presents today for fluid check. Doing well. Some scabbed areas on left thigh.    Allergies: Patient has no known allergies.  Allergies Reconciled.    Review of Systems   Constitutional:  Positive for activity change.        Decreased activity related to surgery, able to ambulate without crutch today   HENT: Negative.     Eyes: Negative.    Respiratory: Negative.     Cardiovascular: Negative.    Gastrointestinal: Negative.    Endocrine: Negative.    Genitourinary: Negative.    Musculoskeletal:  Positive for gait problem and myalgias.        Altered gait related to left lower extremity swelling from surgery   Skin:  Positive for bruise.   Allergic/Immunologic: Negative.    Hematological: Negative.    Psychiatric/Behavioral: Negative.        All review of system has been reviewed and it  is negative except the ones note above.     Objective     /84 (BP Location: Right arm, Patient Position: Sitting, Cuff Size: Adult)   Pulse 71   Ht 152.4 cm (60\")   Wt 63.2 kg (139 lb 6.4 oz)   SpO2 98%   BMI 27.22 kg/m²     Body mass index is 27.22 kg/m².    Physical Exam   Vitals reviewed.  Constitutional  She appears well-developed and well-nourished.     Eyes  System normal.       Cardiovascular: Normal rate.     Pulmonary/Chest  Effort normal.     Skin  Skin is warm and dry.     Psychiatric  She has a normal mood and affect.     Extremities    Legs:        Legs:      Leg comments: Left upper thigh and inner leg incision is healing well, scattered scabs along the incision, remaining staples intact.scattered spitting " sutures and staples noted at knee. No open areas noted. Hematoma upper thigh confirmed with bedside ultrasound. Left medial knee firm hematoma noted, tender to touch. Improved with ace wrap compression. Scabbed area above the ankle, loose scab trimmed with scissors to keep from pulling and causing other tissue trauma.           Result Review :         Assessment and Plan      There are no diagnoses linked to this encounter.    Plan:  Aspirated 88CC's of  dark sanguinous fluid from left thigh, she tolerated well.   RTC at scheduled appointment next week with Dr. Willams.  Instructed to continue ace wrap compression  patient instructed to call office for any questions or concerns and verbalized understanding of all instructions given.      Scribed by Sindi Narvaez MA, acting as a scribe for ANDREI Dave, 05/14/24 12:00 EDT.  ANDREI Dave's signature on the note affirms that the note adequately documents the care provided.      Patient was given instructions and counseling regarding her condition. Please see specific information pulled into the AVS if appropriate.     Summer Egan  05/14/2024   Answers submitted by the patient for this visit:  Other (Submitted on 5/14/2024)  Please describe your symptoms.: Drain area  Have you had these symptoms before?: Yes  How long have you been having these symptoms?: 5-7 days  Primary Reason for Visit (Submitted on 5/14/2024)  What is the primary reason for your visit?: Other

## 2024-05-16 DIAGNOSIS — R60.9 LIPEDEMA: ICD-10-CM

## 2024-05-16 RX ORDER — OXYCODONE HYDROCHLORIDE AND ACETAMINOPHEN 5; 325 MG/1; MG/1
1 TABLET ORAL EVERY 6 HOURS PRN
Qty: 18 TABLET | Refills: 0 | Status: SHIPPED | OUTPATIENT
Start: 2024-05-16

## 2024-05-22 ENCOUNTER — OFFICE VISIT (OUTPATIENT)
Dept: PLASTIC SURGERY | Facility: CLINIC | Age: 44
End: 2024-05-22
Payer: OTHER GOVERNMENT

## 2024-05-22 VITALS
DIASTOLIC BLOOD PRESSURE: 61 MMHG | WEIGHT: 142.2 LBS | OXYGEN SATURATION: 96 % | BODY MASS INDEX: 27.92 KG/M2 | HEIGHT: 60 IN | TEMPERATURE: 98.6 F | HEART RATE: 84 BPM | SYSTOLIC BLOOD PRESSURE: 111 MMHG

## 2024-05-22 DIAGNOSIS — R60.9 LIPEDEMA: Primary | ICD-10-CM

## 2024-05-22 PROCEDURE — 99024 POSTOP FOLLOW-UP VISIT: CPT | Performed by: SURGERY

## 2024-05-23 NOTE — PROGRESS NOTES
"Consult (Ear surgery)            History of Present Illness  Angelique Cordon is a 44 y.o. female who presents to [facility] as a consult for ear surgery. She feels like her ears stick out. Self conscious. Has not had any surgeries to her ears.    Subjective       Patient has no known allergies.  Allergies Reconciled.    Review of Systems    All review of system has been reviewed and it  is negative except the ones note above.     Objective     /72 (BP Location: Left arm, Patient Position: Sitting, Cuff Size: Adult)   Pulse 70   Temp 98.4 °F (36.9 °C) (Temporal)   Ht 152.4 cm (60\")   Wt 64 kg (141 lb)   SpO2 97%   BMI 27.54 kg/m²     Body mass index is 27.54 kg/m².    Physical Exam  Ears: Left ear is more prominent than right. Bilateral antihelix is effaced and the leatha is hypertrophic       Result Review :       Procedures  Cos- in office bilateral otoplasty 1.5hr.      Assessment and Plan      Diagnoses and all orders for this visit:    1. Ear anomaly (Primary)        Plan:  Photos obtained today. Discussed this procedure with patient and her . Procedure will be done in office. Nerve block and under local anesthesia. We offer pronox. We will also prescribe valium. Incisions are all behind the ear to where it can easily be hid. We will cut the cartliage and place sutures. Ear needs to be rotated. Aspirated 50CC's off of left thigh. RTC as scheduled. We will call patient with quote for cosmetic otoplasty.  IOP- bilateral otoplasty     Scribed by Sindi Narvaez MA, acting as a scribe for Raffaele Willams MD, 05/24/24 13:17 EDT.  Raffaele Willams MD's signature on the note affirms that the note adequately documents the care provided.        Patient was given instructions and counseling regarding her condition. Please see specific information pulled into the AVS if appropriate.     Raffaele Willams MD  05/24/2024   Answers submitted " by the patient for this visit:  Other (Submitted on 5/22/2024)  Please describe your symptoms.: Consult lol  Have you had these symptoms before?: No  How long have you been having these symptoms?: Greater than 2 weeks  Please list any medications you are currently taking for this condition.: Na  Please describe any probable cause for these symptoms. : Na  Primary Reason for Visit (Submitted on 5/22/2024)  What is the primary reason for your visit?: Other

## 2024-05-24 ENCOUNTER — CONSULT (OUTPATIENT)
Dept: PLASTIC SURGERY | Facility: CLINIC | Age: 44
End: 2024-05-24

## 2024-05-24 VITALS
BODY MASS INDEX: 27.68 KG/M2 | HEIGHT: 60 IN | HEART RATE: 70 BPM | SYSTOLIC BLOOD PRESSURE: 112 MMHG | TEMPERATURE: 98.4 F | DIASTOLIC BLOOD PRESSURE: 72 MMHG | OXYGEN SATURATION: 97 % | WEIGHT: 141 LBS

## 2024-05-24 DIAGNOSIS — Q17.9 EAR ANOMALY: Primary | ICD-10-CM

## 2024-05-24 PROCEDURE — KYSALESTAX: Performed by: SURGERY

## 2024-05-24 PROCEDURE — COS65: Performed by: SURGERY

## 2024-05-24 RX ORDER — IBUPROFEN 800 MG/1
800 TABLET ORAL
COMMUNITY

## 2024-05-29 ENCOUNTER — TELEPHONE (OUTPATIENT)
Dept: PLASTIC SURGERY | Facility: CLINIC | Age: 44
End: 2024-05-29
Payer: OTHER GOVERNMENT

## 2024-05-29 NOTE — TELEPHONE ENCOUNTER
It would be August before surgery would take place. We have no payment plans. It would have to e paid in full at time of the procedure.

## 2024-05-30 PROBLEM — Q17.9 EAR ANOMALY: Status: ACTIVE | Noted: 2024-05-30

## 2024-06-18 RX ORDER — IBUPROFEN 800 MG/1
800 TABLET ORAL
OUTPATIENT
Start: 2024-06-18

## 2024-06-23 RX ORDER — IBUPROFEN 800 MG/1
800 TABLET ORAL
Status: CANCELLED | OUTPATIENT
Start: 2024-06-23

## 2024-06-24 ENCOUNTER — TELEPHONE (OUTPATIENT)
Dept: FAMILY MEDICINE CLINIC | Facility: CLINIC | Age: 44
End: 2024-06-24
Payer: OTHER GOVERNMENT

## 2024-06-24 NOTE — TELEPHONE ENCOUNTER
Caller: Angelique Cordon    Relationship: Self    Best call back number: [phone]    Requested Prescriptions:     ibuprofen (ADVIL,MOTRIN) 800 MG tablet   (ONCE EVERY EIGHT HOURS AS NEEDED PER PATIENT)       Pharmacy where request should be sent:    [id number] [place] [facility] [address] AT [facility] - [phone]  - [phone]  [phone]       Last office visit with prescribing clinician: 9/25/2023   Last telemedicine visit with prescribing clinician: 3/25/2024   Next office visit with prescribing clinician: Visit date not found     Additional details provided by patient: PATIENT HAS MORE THAN A 3 DAY SUPPLY. PATIENT SAID THESE WERE PRESCRIBED FOR HER FOOT    Does the patient have less than a 3 day supply:  [] Yes  [x] No    Would you like a call back once the refill request has been completed: [] Yes [] No    If the office needs to give you a call back, can they leave a voicemail: [x] Yes [] No    Anny Oliveira   06/24/24 10:07 EDT

## 2024-06-24 NOTE — TELEPHONE ENCOUNTER
Patient had significant anemia on last labs, has not had any follow-up pertaining to this, we need to follow-up to determine calls of anemia prior to prescribing any NSAIDs

## 2024-07-08 ENCOUNTER — OFFICE VISIT (OUTPATIENT)
Dept: FAMILY MEDICINE CLINIC | Facility: CLINIC | Age: 44
End: 2024-07-08
Payer: OTHER GOVERNMENT

## 2024-07-08 VITALS
DIASTOLIC BLOOD PRESSURE: 65 MMHG | BODY MASS INDEX: 28.43 KG/M2 | WEIGHT: 144.8 LBS | SYSTOLIC BLOOD PRESSURE: 117 MMHG | HEIGHT: 60 IN | OXYGEN SATURATION: 100 % | HEART RATE: 81 BPM

## 2024-07-08 DIAGNOSIS — D64.9 ANEMIA, UNSPECIFIED TYPE: ICD-10-CM

## 2024-07-08 DIAGNOSIS — Z12.31 BREAST CANCER SCREENING BY MAMMOGRAM: ICD-10-CM

## 2024-07-08 DIAGNOSIS — Z13.220 LIPID SCREENING: ICD-10-CM

## 2024-07-08 DIAGNOSIS — Z13.29 THYROID DISORDER SCREEN: ICD-10-CM

## 2024-07-08 DIAGNOSIS — Z00.00 ANNUAL PHYSICAL EXAM: Primary | ICD-10-CM

## 2024-07-08 PROCEDURE — 99396 PREV VISIT EST AGE 40-64: CPT | Performed by: NURSE PRACTITIONER

## 2024-07-08 NOTE — PROGRESS NOTES
Chief Complaint  Annual Exam    SUBJECTIVE  Angelique Cordon presents to [facility] for annual exam. Pt is due mammogram and labs.     Pt states takes the IBP PRN for her foot pain     Pt has been noted to be anemic on her last several set of labs, did require blood transfusion once, however this was postop.  Patient reports her father also has hx of anemia       History of Present Illness  Past Medical History:   Diagnosis Date    Abnormal bone density screening     Anxiety     History of transfusion     Lipedema of lower extremity     LEGS AND ARMS    Pap smear for cervical cancer screening 2018    Varicose vein of leg     Visit for screening mammogram       Family History   Problem Relation Age of Onset    Diabetes Mother     Sleep apnea Mother     COPD Mother     Hypertension Mother     Colon cancer Father     Malig Hyperthermia Neg Hx       Past Surgical History:   Procedure Laterality Date    EXCISION LESION Left 4/1/2022    Procedure: EXCISION CYST LEFT AXILLARY;  Surgeon: Rosalie Talley MD;  Location: [facility];  Service: General;  Laterality: Left;    FOOT SURGERY Left 2003/2009 HAD 3 DIFFERENT SURGERIES    L FOOT/TENDON RELEASE PLANTAR FASIA    LIPOSUCTION Right 10/24/2023    Procedure: LIPOSUCTION, Right Lower Extremity Circumferential Liposuction with Skin Resection and negative pressure dressing;  Surgeon: Raffaele Willams MD;  Location: [facility];  Service: Plastics;  Laterality: Right;    LIPOSUCTION Left 4/19/2024    Procedure: LIPOSUCTION, Left Lower Extremity Circumferential Liposuction with Skin Resection and negative pressure dressing;  Surgeon: Raffaele Willams MD;  Location: [facility];  Service: Plastics;  Laterality: Left;        Current Outpatient Medications:     ibuprofen (ADVIL,MOTRIN) 800 MG tablet, Take 1 tablet by mouth Daily., Disp: , Rfl:     melatonin 5 MG tablet tablet, Take 1 tablet by mouth Every Night., Disp: , Rfl:  "    Vortioxetine HBr (Trintellix) 20 MG tablet, Take 1 tablet by mouth Daily., Disp: 90 tablet, Rfl: 1    OBJECTIVE  Vital Signs:   /65   Pulse 81   Ht 152.4 cm (60\")   Wt 65.7 kg (144 lb 12.8 oz)   SpO2 100%   BMI 28.28 kg/m²    Estimated body mass index is 28.28 kg/m² as calculated from the following:    Height as of this encounter: 152.4 cm (60\").    Weight as of this encounter: 65.7 kg (144 lb 12.8 oz).     Wt Readings from Last 3 Encounters:   07/08/24 65.7 kg (144 lb 12.8 oz)   05/24/24 64 kg (141 lb)   05/22/24 64.5 kg (142 lb 3.2 oz)     BP Readings from Last 3 Encounters:   07/08/24 117/65   05/24/24 112/72   05/22/24 111/61       Physical Exam  Vitals reviewed.   Constitutional:       General: She is not in acute distress.     Appearance: Normal appearance. She is well-developed. She is not diaphoretic.   HENT:      Head: Normocephalic and atraumatic. Hair is normal.      Right Ear: Hearing, tympanic membrane, ear canal and external ear normal. No decreased hearing noted. No drainage.      Left Ear: Hearing, tympanic membrane, ear canal and external ear normal. No decreased hearing noted.      Nose: Nose normal. No nasal deformity.      Mouth/Throat:      Mouth: Mucous membranes are moist.   Eyes:      General: Lids are normal.         Right eye: No discharge.         Left eye: No discharge.      Extraocular Movements: Extraocular movements intact.      Conjunctiva/sclera: Conjunctivae normal.      Pupils: Pupils are equal, round, and reactive to light.   Neck:      Thyroid: No thyromegaly.      Vascular: No JVD.   Cardiovascular:      Rate and Rhythm: Normal rate and regular rhythm.      Pulses: Normal pulses.      Heart sounds: Normal heart sounds. No murmur heard.     No friction rub. No gallop.   Pulmonary:      Effort: Pulmonary effort is normal. No respiratory distress.      Breath sounds: Normal breath sounds. No wheezing or rales.   Chest:      Chest wall: No tenderness.   Abdominal:     "  General: Bowel sounds are normal. There is no distension.      Palpations: Abdomen is soft. There is no mass.      Tenderness: There is no abdominal tenderness. There is no guarding or rebound.      Hernia: No hernia is present.   Musculoskeletal:         General: No tenderness or deformity. Normal range of motion.      Cervical back: Normal range of motion and neck supple.   Lymphadenopathy:      Cervical: No cervical adenopathy.   Skin:     General: Skin is warm and dry.      Findings: No erythema or rash.   Neurological:      Mental Status: She is alert and oriented to person, place, and time.      Cranial Nerves: No cranial nerve deficit.      Motor: No abnormal muscle tone.      Coordination: Coordination normal.      Deep Tendon Reflexes: Reflexes are normal and symmetric. Reflexes normal.   Psychiatric:         Mood and Affect: Mood normal.         Behavior: Behavior normal.         Thought Content: Thought content normal.         Judgment: Judgment normal.          Result Review    CMP          10/29/2023    13:08 4/20/2024    04:06 4/21/2024    04:25   CMP   Glucose 120  196  110    BUN 8  8  5    Creatinine 0.64  0.62  0.57    EGFR 112.6  112.8  115.1    Sodium 138  135  138    Potassium 3.7  3.9  3.7    Chloride 102  105  107    Calcium 9.0  7.7  7.6    Total Protein 6.6      Albumin 3.4      Globulin 3.2      Total Bilirubin 0.3      Alkaline Phosphatase 47      AST (SGOT) 10      ALT (SGPT) 9      Albumin/Globulin Ratio 1.1      BUN/Creatinine Ratio 12.5  12.9  8.8    Anion Gap 10.2  7.9  6.0      CBC          4/20/2024    04:06 4/21/2024    04:25 4/21/2024    18:45 4/23/2024    03:50   CBC   WBC 7.57  4.34   6.87    RBC 2.63  2.02   2.89    Hemoglobin 8.2  6.3  8.8  8.9    Hematocrit 25.4  19.8  27.0  27.2    MCV 96.6  98.0   94.1    MCH 31.2  31.2   30.8    MCHC 32.3  31.8   32.7    RDW 13.1  13.2   13.2    Platelets 203  124   174      CBC w/diff          4/20/2024    04:06 4/21/2024    04:25  4/21/2024    18:45 4/23/2024    03:50   CBC w/Diff   WBC 7.57  4.34   6.87    RBC 2.63  2.02   2.89    Hemoglobin 8.2  6.3  8.8  8.9    Hematocrit 25.4  19.8  27.0  27.2    MCV 96.6  98.0   94.1    MCH 31.2  31.2   30.8    MCHC 32.3  31.8   32.7    RDW 13.1  13.2   13.2    Platelets 203  124   174                Lab Results   Component Value Date    KXAT04ON 33.2 12/02/2020           Lab Results   Component Value Date    UFICDIRB18 715 11/28/2023       No Images in the past 120 days found..     The above data has been reviewed by ANDREI Olivera 07/08/2024 08:53 EDT.          Patient Care Team:  Hannah Parrish APRN as PCP - General (Nurse Practitioner)  Rosalie Talley MD as Consulting Physician (General Surgery)  Raffaele Willams MD as Consulting Physician (Plastic Surgery)            ASSESSMENT & PLAN    Diagnoses and all orders for this visit:    1. Annual physical exam (Primary)  -     Comprehensive Metabolic Panel; Future  -     CBC & Differential; Future  -     Lipid Panel; Future  -     TSH Rfx On Abnormal To Free T4; Future    2. Thyroid disorder screen  -     TSH Rfx On Abnormal To Free T4; Future    3. Lipid screening  -     Lipid Panel; Future    4. Breast cancer screening by mammogram  -     Mammo Screening Digital Tomosynthesis Bilateral With CAD; Future    5. Anemia, unspecified type  Comments:  We will obtain labs for further evaluation of persistent anemia  Orders:  -     Iron Profile; Future  -     Vitamin B12 & Folate; Future  -     Ferritin; Future       The patient is advised to begin progressive daily aerobic exercise program, follow a low fat, low cholesterol diet, attempt to lose weight, continue current medications, and return for routine annual checkups.    Tobacco Use: Low Risk  (7/8/2024)    Patient History     Smoking Tobacco Use: Never     Smokeless Tobacco Use: Never     Passive Exposure: Never     The patient is advised to begin progressive daily aerobic  exercise program, continue current medications, and return for routine annual checkups.    Follow Up     Return if symptoms worsen or fail to improve.        Patient was given instructions and counseling regarding her condition or for health maintenance advice. Please see specific information pulled into the AVS if appropriate.   I have reviewed information obtained and documented by others and I have confirmed the accuracy of this documented note.    Hannah Parrish, APRN

## 2024-07-09 ENCOUNTER — TELEPHONE (OUTPATIENT)
Dept: PLASTIC SURGERY | Facility: CLINIC | Age: 44
End: 2024-07-09
Payer: OTHER GOVERNMENT

## 2024-07-09 ENCOUNTER — LAB (OUTPATIENT)
Dept: LAB | Facility: HOSPITAL | Age: 44
End: 2024-07-09
Payer: OTHER GOVERNMENT

## 2024-07-09 DIAGNOSIS — D64.9 ANEMIA, UNSPECIFIED TYPE: ICD-10-CM

## 2024-07-09 DIAGNOSIS — Z13.29 THYROID DISORDER SCREEN: ICD-10-CM

## 2024-07-09 DIAGNOSIS — Z13.220 LIPID SCREENING: ICD-10-CM

## 2024-07-09 DIAGNOSIS — Z00.00 ANNUAL PHYSICAL EXAM: ICD-10-CM

## 2024-07-09 LAB
ALBUMIN SERPL-MCNC: 4.4 G/DL (ref 3.5–5.2)
ALBUMIN/GLOB SERPL: 1.6 G/DL
ALP SERPL-CCNC: 42 U/L (ref 39–117)
ALT SERPL W P-5'-P-CCNC: 18 U/L (ref 1–33)
ANION GAP SERPL CALCULATED.3IONS-SCNC: 11 MMOL/L (ref 5–15)
AST SERPL-CCNC: 15 U/L (ref 1–32)
BASOPHILS # BLD AUTO: 0.03 10*3/MM3 (ref 0–0.2)
BASOPHILS NFR BLD AUTO: 0.7 % (ref 0–1.5)
BILIRUB SERPL-MCNC: 0.3 MG/DL (ref 0–1.2)
BUN SERPL-MCNC: 16 MG/DL (ref 6–20)
BUN/CREAT SERPL: 22.2 (ref 7–25)
CALCIUM SPEC-SCNC: 9.6 MG/DL (ref 8.6–10.5)
CHLORIDE SERPL-SCNC: 103 MMOL/L (ref 98–107)
CHOLEST SERPL-MCNC: 256 MG/DL (ref 0–200)
CO2 SERPL-SCNC: 25 MMOL/L (ref 22–29)
CREAT SERPL-MCNC: 0.72 MG/DL (ref 0.57–1)
DEPRECATED RDW RBC AUTO: 42.7 FL (ref 37–54)
EGFRCR SERPLBLD CKD-EPI 2021: 105.9 ML/MIN/1.73
EOSINOPHIL # BLD AUTO: 0.13 10*3/MM3 (ref 0–0.4)
EOSINOPHIL NFR BLD AUTO: 3 % (ref 0.3–6.2)
ERYTHROCYTE [DISTWIDTH] IN BLOOD BY AUTOMATED COUNT: 12.7 % (ref 12.3–15.4)
FERRITIN SERPL-MCNC: 166 NG/ML (ref 13–150)
FOLATE SERPL-MCNC: 8.39 NG/ML (ref 4.78–24.2)
GLOBULIN UR ELPH-MCNC: 2.8 GM/DL
GLUCOSE SERPL-MCNC: 87 MG/DL (ref 65–99)
HCT VFR BLD AUTO: 44 % (ref 34–46.6)
HDLC SERPL-MCNC: 65 MG/DL (ref 40–60)
HGB BLD-MCNC: 14.3 G/DL (ref 12–15.9)
IMM GRANULOCYTES # BLD AUTO: 0.01 10*3/MM3 (ref 0–0.05)
IMM GRANULOCYTES NFR BLD AUTO: 0.2 % (ref 0–0.5)
IRON 24H UR-MRATE: 98 MCG/DL (ref 37–145)
IRON SATN MFR SERPL: 27 % (ref 20–50)
LDLC SERPL CALC-MCNC: 178 MG/DL (ref 0–100)
LDLC/HDLC SERPL: 2.7 {RATIO}
LYMPHOCYTES # BLD AUTO: 1.47 10*3/MM3 (ref 0.7–3.1)
LYMPHOCYTES NFR BLD AUTO: 33.4 % (ref 19.6–45.3)
MCH RBC QN AUTO: 30 PG (ref 26.6–33)
MCHC RBC AUTO-ENTMCNC: 32.5 G/DL (ref 31.5–35.7)
MCV RBC AUTO: 92.4 FL (ref 79–97)
MONOCYTES # BLD AUTO: 0.35 10*3/MM3 (ref 0.1–0.9)
MONOCYTES NFR BLD AUTO: 8 % (ref 5–12)
NEUTROPHILS NFR BLD AUTO: 2.41 10*3/MM3 (ref 1.7–7)
NEUTROPHILS NFR BLD AUTO: 54.7 % (ref 42.7–76)
NRBC BLD AUTO-RTO: 0 /100 WBC (ref 0–0.2)
PLATELET # BLD AUTO: 191 10*3/MM3 (ref 140–450)
PMV BLD AUTO: 14.1 FL (ref 6–12)
POTASSIUM SERPL-SCNC: 4.2 MMOL/L (ref 3.5–5.2)
PROT SERPL-MCNC: 7.2 G/DL (ref 6–8.5)
RBC # BLD AUTO: 4.76 10*6/MM3 (ref 3.77–5.28)
SODIUM SERPL-SCNC: 139 MMOL/L (ref 136–145)
TIBC SERPL-MCNC: 362 MCG/DL (ref 298–536)
TRANSFERRIN SERPL-MCNC: 243 MG/DL (ref 200–360)
TRIGL SERPL-MCNC: 77 MG/DL (ref 0–150)
TSH SERPL DL<=0.05 MIU/L-ACNC: 3.44 UIU/ML (ref 0.27–4.2)
VIT B12 BLD-MCNC: 639 PG/ML (ref 211–946)
VLDLC SERPL-MCNC: 13 MG/DL (ref 5–40)
WBC NRBC COR # BLD AUTO: 4.4 10*3/MM3 (ref 3.4–10.8)

## 2024-07-09 PROCEDURE — 84466 ASSAY OF TRANSFERRIN: CPT

## 2024-07-09 PROCEDURE — 84443 ASSAY THYROID STIM HORMONE: CPT

## 2024-07-09 PROCEDURE — 83540 ASSAY OF IRON: CPT

## 2024-07-09 PROCEDURE — 80061 LIPID PANEL: CPT

## 2024-07-09 PROCEDURE — 82607 VITAMIN B-12: CPT

## 2024-07-09 PROCEDURE — 82728 ASSAY OF FERRITIN: CPT

## 2024-07-09 PROCEDURE — 80053 COMPREHEN METABOLIC PANEL: CPT

## 2024-07-09 PROCEDURE — 82746 ASSAY OF FOLIC ACID SERUM: CPT

## 2024-07-09 PROCEDURE — 85025 COMPLETE CBC W/AUTO DIFF WBC: CPT

## 2024-07-09 PROCEDURE — 36415 COLL VENOUS BLD VENIPUNCTURE: CPT

## 2024-07-09 NOTE — TELEPHONE ENCOUNTER
Rcvd call from pt inquiring on what codes would be used for her arms so she can touch base with  to make sure no additional treatment is needed before approval. Advised pt until there is an actual consult no codes available. Pt will discuss at her appt on 8/22/24.

## 2024-07-22 RX ORDER — SEMAGLUTIDE 0.5 MG/.5ML
0.5 INJECTION, SOLUTION SUBCUTANEOUS WEEKLY
Qty: 2 ML | Refills: 1 | Status: SHIPPED | OUTPATIENT
Start: 2024-07-22

## 2024-07-26 ENCOUNTER — HOSPITAL ENCOUNTER (OUTPATIENT)
Dept: MAMMOGRAPHY | Facility: HOSPITAL | Age: 44
Discharge: HOME | End: 2024-07-26
Admitting: NURSE PRACTITIONER

## 2024-07-26 DIAGNOSIS — R92.8 ABNORMAL SCREENING MAMMOGRAM: Primary | ICD-10-CM

## 2024-07-26 DIAGNOSIS — Z12.31 BREAST CANCER SCREENING BY MAMMOGRAM: ICD-10-CM

## 2024-07-26 PROCEDURE — 77067 SCR MAMMO BI INCL CAD: CPT

## 2024-07-26 PROCEDURE — 77063 BREAST TOMOSYNTHESIS BI: CPT

## 2024-07-30 RX ORDER — IBUPROFEN 600 MG/1
600 TABLET ORAL EVERY 8 HOURS PRN
Qty: 30 TABLET | Refills: 0 | Status: SHIPPED | OUTPATIENT
Start: 2024-07-30

## 2024-08-06 ENCOUNTER — HOSPITAL ENCOUNTER (OUTPATIENT)
Dept: MAMMOGRAPHY | Facility: HOSPITAL | Age: 44
Discharge: HOME | End: 2024-08-06
Payer: OTHER GOVERNMENT

## 2024-08-06 ENCOUNTER — HOSPITAL ENCOUNTER (OUTPATIENT)
Dept: ULTRASOUND IMAGING | Facility: HOSPITAL | Age: 44
Discharge: HOME | End: 2024-08-06
Payer: OTHER GOVERNMENT

## 2024-08-06 DIAGNOSIS — R92.8 ABNORMAL SCREENING MAMMOGRAM: ICD-10-CM

## 2024-08-06 PROCEDURE — 76642 ULTRASOUND BREAST LIMITED: CPT

## 2024-08-06 PROCEDURE — G0279 TOMOSYNTHESIS, MAMMO: HCPCS

## 2024-08-06 PROCEDURE — 77065 DX MAMMO INCL CAD UNI: CPT

## 2024-08-12 ENCOUNTER — OFFICE VISIT (OUTPATIENT)
Dept: FAMILY MEDICINE CLINIC | Facility: CLINIC | Age: 44
End: 2024-08-12
Payer: OTHER GOVERNMENT

## 2024-08-12 VITALS
WEIGHT: 140 LBS | HEART RATE: 84 BPM | TEMPERATURE: 97.9 F | HEIGHT: 60 IN | SYSTOLIC BLOOD PRESSURE: 119 MMHG | BODY MASS INDEX: 27.48 KG/M2 | DIASTOLIC BLOOD PRESSURE: 68 MMHG | OXYGEN SATURATION: 99 %

## 2024-08-12 DIAGNOSIS — R53.83 FATIGUE, UNSPECIFIED TYPE: ICD-10-CM

## 2024-08-12 DIAGNOSIS — R05.9 COUGH, UNSPECIFIED TYPE: ICD-10-CM

## 2024-08-12 DIAGNOSIS — R42 DIZZINESS: ICD-10-CM

## 2024-08-12 DIAGNOSIS — R68.83 CHILLS: ICD-10-CM

## 2024-08-12 DIAGNOSIS — J02.9 SORE THROAT: Primary | ICD-10-CM

## 2024-08-12 LAB
EXPIRATION DATE: NORMAL
EXPIRATION DATE: NORMAL
FLUAV AG UPPER RESP QL IA.RAPID: NOT DETECTED
FLUBV AG UPPER RESP QL IA.RAPID: NOT DETECTED
INTERNAL CONTROL: NORMAL
INTERNAL CONTROL: NORMAL
Lab: NORMAL
Lab: NORMAL
S PYO AG THROAT QL: NEGATIVE
SARS-COV-2 AG UPPER RESP QL IA.RAPID: NOT DETECTED

## 2024-08-12 PROCEDURE — 87880 STREP A ASSAY W/OPTIC: CPT | Performed by: NURSE PRACTITIONER

## 2024-08-12 PROCEDURE — 99213 OFFICE O/P EST LOW 20 MIN: CPT | Performed by: NURSE PRACTITIONER

## 2024-08-12 PROCEDURE — 87428 SARSCOV & INF VIR A&B AG IA: CPT | Performed by: NURSE PRACTITIONER

## 2024-08-12 RX ORDER — SEMAGLUTIDE 0.5 MG/.5ML
0.5 INJECTION, SOLUTION SUBCUTANEOUS WEEKLY
Qty: 2 ML | Refills: 1 | Status: SHIPPED | OUTPATIENT
Start: 2024-08-12

## 2024-08-12 NOTE — PROGRESS NOTES
Chief Complaint  Fatigue, Dizziness, Chills, Sore Throat, and Cough    SUBJECTIVE  Angelique Cordon presents to [facility]    History of Present Illness  Past Medical History:   Diagnosis Date    Abnormal bone density screening     Anxiety     History of transfusion     Lipedema of lower extremity     LEGS AND ARMS    Pap smear for cervical cancer screening 2018    Varicose vein of leg     Visit for screening mammogram       Family History   Problem Relation Age of Onset    Diabetes Mother     Sleep apnea Mother     COPD Mother     Hypertension Mother     Colon cancer Father     Malig Hyperthermia Neg Hx       Past Surgical History:   Procedure Laterality Date    EXCISION LESION Left 4/1/2022    Procedure: EXCISION CYST LEFT AXILLARY;  Surgeon: Rosalie Talley MD;  Location: [facility];  Service: General;  Laterality: Left;    FOOT SURGERY Left 2003/2009 HAD 3 DIFFERENT SURGERIES    L FOOT/TENDON RELEASE PLANTAR FASIA    LIPOSUCTION Right 10/24/2023    Procedure: LIPOSUCTION, Right Lower Extremity Circumferential Liposuction with Skin Resection and negative pressure dressing;  Surgeon: Raffaele Willams MD;  Location: [facility];  Service: Plastics;  Laterality: Right;    LIPOSUCTION Left 4/19/2024    Procedure: LIPOSUCTION, Left Lower Extremity Circumferential Liposuction with Skin Resection and negative pressure dressing;  Surgeon: Raffaele Willams MD;  Location: [facility];  Service: Plastics;  Laterality: Left;        Current Outpatient Medications:     ibuprofen (ADVIL,MOTRIN) 600 MG tablet, Take 1 tablet by mouth Every 8 (Eight) Hours As Needed for Mild Pain., Disp: 30 tablet, Rfl: 0    melatonin 5 MG tablet tablet, Take 1 tablet by mouth Every Night., Disp: , Rfl:     Semaglutide-Weight Management (Wegovy) 0.5 MG/0.5ML solution auto-injector, Inject 0.5 mL under the skin into the appropriate area as directed 1 (One) Time Per Week., Disp: 2 mL, Rfl: 1    " Vortioxetine HBr (Trintellix) 20 MG tablet, Take 1 tablet by mouth Daily., Disp: 90 tablet, Rfl: 1    OBJECTIVE  Vital Signs:   /68 (BP Location: Right arm, Patient Position: Sitting, Cuff Size: Adult)   Pulse 84   Temp 97.9 °F (36.6 °C)   Ht 152.4 cm (60\")   Wt 63.5 kg (140 lb)   LMP 08/11/2024 (Exact Date)   SpO2 99%   BMI 27.34 kg/m²    Estimated body mass index is 27.34 kg/m² as calculated from the following:    Height as of this encounter: 152.4 cm (60\").    Weight as of this encounter: 63.5 kg (140 lb).     Wt Readings from Last 3 Encounters:   08/12/24 63.5 kg (140 lb)   07/08/24 65.7 kg (144 lb 12.8 oz)   05/24/24 64 kg (141 lb)     BP Readings from Last 3 Encounters:   08/12/24 119/68   07/08/24 117/65   05/24/24 112/72       Physical Exam  Vitals reviewed.   Constitutional:       Appearance: Normal appearance. She is well-developed.   HENT:      Head: Normocephalic and atraumatic.      Right Ear: Hearing, tympanic membrane, ear canal and external ear normal.      Left Ear: Hearing, tympanic membrane, ear canal and external ear normal.      Nose: Nose normal.      Mouth/Throat:      Mouth: Mucous membranes are moist.      Pharynx: Oropharynx is clear. No oropharyngeal exudate.   Eyes:      Conjunctiva/sclera: Conjunctivae normal.      Pupils: Pupils are equal, round, and reactive to light.   Cardiovascular:      Rate and Rhythm: Normal rate and regular rhythm.      Pulses: Normal pulses.      Heart sounds: Normal heart sounds. No murmur heard.     No friction rub. No gallop.   Pulmonary:      Effort: Pulmonary effort is normal.      Breath sounds: Normal breath sounds. No wheezing or rhonchi.   Skin:     General: Skin is warm and dry.   Neurological:      Mental Status: She is alert and oriented to person, place, and time.      Cranial Nerves: No cranial nerve deficit.   Psychiatric:         Mood and Affect: Mood and affect normal.         Behavior: Behavior normal.         Thought Content: " Thought content normal.         Judgment: Judgment normal.          Result Review    SARS Antigen   Date Value Ref Range Status   08/12/2024 Not Detected Not Detected, Presumptive Negative Final     Influenza A Antigen PHI   Date Value Ref Range Status   08/12/2024 Not Detected Not Detected Final     Influenza B Antigen PHI   Date Value Ref Range Status   08/12/2024 Not Detected Not Detected Final     Internal Control   Date Value Ref Range Status   08/12/2024 Passed Passed Final     Lot Number   Date Value Ref Range Status   08/12/2024 10,598  Final     Expiration Date   Date Value Ref Range Status   08/12/2024 10,252,025  Final     Strep          8/12/2024    16:17   Common Labs   POC Strep A, Molecular Negative              Patient Care Team:  Hannah Parrish APRN as PCP - General (Nurse Practitioner)  Rosalie Talley MD as Consulting Physician (General Surgery)  Raffaele Willams MD as Consulting Physician (Plastic Surgery)            ASSESSMENT & PLAN    Diagnoses and all orders for this visit:    1. Sore throat (Primary)  -     POCT SARS-CoV-2 Antigen PHI + Flu  -     POCT rapid strep A    2. Fatigue, unspecified type  -     POCT SARS-CoV-2 Antigen PHI + Flu  -     POCT rapid strep A    3. Cough, unspecified type  -     POCT SARS-CoV-2 Antigen PHI + Flu  -     POCT rapid strep A    4. Chills  -     POCT SARS-CoV-2 Antigen PHI + Flu  -     POCT rapid strep A    5. Dizziness  -     POCT SARS-CoV-2 Antigen PHI + Flu  -     POCT rapid strep A         Tobacco Use: Low Risk  (8/12/2024)    Patient History     Smoking Tobacco Use: Never     Smokeless Tobacco Use: Never     Passive Exposure: Never       Follow Up     Return if symptoms worsen or fail to improve.      Patient was given instructions and counseling regarding her condition or for health maintenance advice. Please see specific information pulled into the AVS if appropriate.   I have reviewed information obtained and documented by others and  I have confirmed the accuracy of this documented note.    Sita Boss, APRN

## 2024-08-16 NOTE — PROGRESS NOTES
"Chief Complaint  Procedure    Subjective              History of Present Illness  Angelique Cordon is a 44 y.o. female who presents to [facility] as a  for In Office Procedure for Otoplasty.        Allergies: Patient has no known allergies.  Allergies Reconciled.    Review of Systems   All review of system has been reviewed and it  is negative except the ones note above.     Objective     /74 (BP Location: Left arm, Patient Position: Sitting, Cuff Size: Adult)   Pulse 98   Ht 152.4 cm (60\")   Wt 62.6 kg (138 lb)   SpO2 97%   BMI 26.95 kg/m²     Body mass index is 26.95 kg/m².    Physical Exam    Cardiovascular: Normal rate    Pulmonary/Chest: Effort normal    Face:     Result Review :       Otoplasty    Date/Time: 8/22/2024 10:51 AM    Performed by: Raffaele Willams MD  Authorized by: Raffaele Willams MD  Preparation: Patient was prepped and draped in the usual sterile fashion.  Local anesthesia used: yes    Anesthesia:  Local anesthesia used: yes  Local Anesthetic: lidocaine 1% with epinephrine  Anesthetic total: 30 mL    Sedation:  Patient sedated: no    Patient tolerance: patient tolerated the procedure well with no immediate complications  Comments: Bilateral otoplasty       Procedure:  BILATERAL OSTEOPLASTIES - FURNAS AND MUSTARDE TECHNIQUE      After risks and benefits were discussed to patient and inform consent was signed, patient was placed in the procedure room. A time out was performed confirming patient's name, surgery and location. The staff was identified by name.   I started with bilateral ear nerve blocks. I started the procedure on the left ear. That ear was prepped on a sterile fashion way. Then, I performed a postauricular incision  followed by a supra-perichondrial dissection toward the helical rim to expose the posterior cartilage. then, with two 30g needles, I marked the new anti helix fold from the anterior aspect of the " ear. I then visualized the needles on the posterior cartilage and marked that line. With a knife, I incised the cartilage on its full thickness. Two horizontal mattress sutures  with 4-0 vicryl were placed and the anti helix was folded (mustardé) .Then, I focused in correcting the conchal cartilage hypertrophy. Through the posterior incision, I marked the excess of cartilage and resected. Then, using a 2-0 vicryl, I fixed the ear lateral perichondrium to the mastoid periosteum while avoiding the anterior conchal skin. The correction was evaluated. Then the skin was closed with a running 4-0 monocryl.   A mirror procedure was performed on the contra lateral side. Patient tolerated procedure well with no complications.              Assessment and Plan      Diagnoses and all orders for this visit:    1. Ear anomaly (Primary)  -     oxyCODONE-acetaminophen (Percocet) 5-325 MG per tablet; Take 1 tablet by mouth Every 6 (Six) Hours As Needed for Moderate Pain.  Dispense: 30 tablet; Refill: 0    Other orders  -     Otoplasty        Keep incision clean, dry, and intact. Apply thin layer bacitracin ointment BID. RTC Monday for facial suture removal           Scribed by Summer Egan, acting as a scribe for Raffaele Willams MD, 08/22/24 11:03 EDT.  Raffaele Willams MD's signature on the note affirms that the note adequately documents the care provided.           Follow Up     Return RTC in 1 week.    Patient was given instructions and counseling regarding her condition. Please see specific information pulled into the AVS if appropriate.     Raffaele Willams MD  08/22/2024

## 2024-08-22 ENCOUNTER — PROCEDURE VISIT (OUTPATIENT)
Dept: PLASTIC SURGERY | Facility: CLINIC | Age: 44
End: 2024-08-22

## 2024-08-22 VITALS
HEIGHT: 60 IN | WEIGHT: 138 LBS | OXYGEN SATURATION: 97 % | HEART RATE: 98 BPM | DIASTOLIC BLOOD PRESSURE: 74 MMHG | SYSTOLIC BLOOD PRESSURE: 110 MMHG | BODY MASS INDEX: 27.09 KG/M2

## 2024-08-22 DIAGNOSIS — Q17.9 EAR ANOMALY: Primary | ICD-10-CM

## 2024-08-22 PROCEDURE — COS22: Performed by: SURGERY

## 2024-08-22 PROCEDURE — KYSALESTAX: Performed by: SURGERY

## 2024-08-22 RX ORDER — OXYCODONE AND ACETAMINOPHEN 5; 325 MG/1; MG/1
1 TABLET ORAL EVERY 6 HOURS PRN
Qty: 30 TABLET | Refills: 0 | Status: SHIPPED | OUTPATIENT
Start: 2024-08-22

## 2024-08-23 NOTE — PROGRESS NOTES
"Chief Complaint  Post-op Follow-up (otoplasty)    Subjective          History of Present Illness  Angelique Cordon is a 44 y.o. female who presents to [facility] for Postoperative Follow-Up of IOP bilateral otoplasty.    Pt presents today for 1 week post op.    Allergies: Patient has no known allergies.  Allergies Reconciled.    Review of Systems   All review of system has been reviewed and it  is negative except the ones note above.     Objective     /70 (BP Location: Left arm, Patient Position: Sitting, Cuff Size: Adult)   Pulse 88   Ht 152.4 cm (60\")   Wt 63.5 kg (140 lb)   SpO2 96%   BMI 27.34 kg/m²     Body mass index is 27.34 kg/m².    Physical Exam  General Inspection: Incision healing well, no swelling, no erythema, no drainage.  Ears:c/d/I with expected ecchymosis    Result Review :                Assessment and Plan      Diagnoses and all orders for this visit:    1. Ear anomaly (Primary)        Plan:  Pt will return in 1 month       Scribed by Summer Egan, acting as a scribe for Raffaele Willams MD, 08/29/24 14:17 EDT.  Raffaele Willams MD's signature on the note affirms that the note adequately documents the care provided.          Follow Up     Return RTC 30days from scheduled surgery 10/16/24.    Patient was given instructions and counseling regarding her condition. Please see specific information pulled into the AVS if appropriate.     Raffaele Willams MD  08/29/2024   "

## 2024-08-27 ENCOUNTER — TELEPHONE (OUTPATIENT)
Dept: FAMILY MEDICINE CLINIC | Facility: CLINIC | Age: 44
End: 2024-08-27
Payer: OTHER GOVERNMENT

## 2024-08-27 RX ORDER — SEMAGLUTIDE 1 MG/.5ML
1 INJECTION, SOLUTION SUBCUTANEOUS WEEKLY
Qty: 2 ML | Refills: 1 | Status: SHIPPED | OUTPATIENT
Start: 2024-08-27

## 2024-08-27 NOTE — TELEPHONE ENCOUNTER
Please let patient know that I have sent in the next dose, however 1.7 is not the next dose, she was on the 0.5 mg, so the next dose is 1 mg   no

## 2024-08-29 ENCOUNTER — OFFICE VISIT (OUTPATIENT)
Dept: PLASTIC SURGERY | Facility: CLINIC | Age: 44
End: 2024-08-29

## 2024-08-29 VITALS
BODY MASS INDEX: 27.48 KG/M2 | WEIGHT: 140 LBS | DIASTOLIC BLOOD PRESSURE: 70 MMHG | HEART RATE: 88 BPM | HEIGHT: 60 IN | OXYGEN SATURATION: 96 % | SYSTOLIC BLOOD PRESSURE: 102 MMHG

## 2024-08-29 DIAGNOSIS — Q17.9 EAR ANOMALY: Primary | ICD-10-CM

## 2024-08-29 PROCEDURE — COS64: Performed by: SURGERY

## 2024-09-05 ENCOUNTER — LAB (OUTPATIENT)
Dept: LAB | Facility: HOSPITAL | Age: 44
End: 2024-09-05
Payer: OTHER GOVERNMENT

## 2024-09-05 DIAGNOSIS — Z01.818 PRE-OPERATIVE EXAMINATION: ICD-10-CM

## 2024-09-05 LAB — COTININE UR-MCNC: NEGATIVE NG/ML

## 2024-09-05 PROCEDURE — G0480 DRUG TEST DEF 1-7 CLASSES: HCPCS

## 2024-09-06 ENCOUNTER — TELEPHONE (OUTPATIENT)
Dept: PLASTIC SURGERY | Facility: CLINIC | Age: 44
End: 2024-09-06
Payer: OTHER GOVERNMENT

## 2024-09-12 ENCOUNTER — OFFICE VISIT (OUTPATIENT)
Dept: FAMILY MEDICINE CLINIC | Facility: CLINIC | Age: 44
End: 2024-09-12
Payer: OTHER GOVERNMENT

## 2024-09-12 VITALS
OXYGEN SATURATION: 99 % | HEIGHT: 60 IN | HEART RATE: 99 BPM | WEIGHT: 135 LBS | SYSTOLIC BLOOD PRESSURE: 108 MMHG | BODY MASS INDEX: 26.5 KG/M2 | DIASTOLIC BLOOD PRESSURE: 61 MMHG

## 2024-09-12 DIAGNOSIS — L98.9 SKIN LESION: ICD-10-CM

## 2024-09-12 DIAGNOSIS — M54.50 LOW BACK PAIN, UNSPECIFIED BACK PAIN LATERALITY, UNSPECIFIED CHRONICITY, UNSPECIFIED WHETHER SCIATICA PRESENT: Primary | ICD-10-CM

## 2024-09-12 PROCEDURE — 99213 OFFICE O/P EST LOW 20 MIN: CPT | Performed by: NURSE PRACTITIONER

## 2024-09-12 PROCEDURE — 87255 GENET VIRUS ISOLATE HSV: CPT | Performed by: NURSE PRACTITIONER

## 2024-09-12 NOTE — PROGRESS NOTES
Chief Complaint  Tailbone Pain    SUBJECTIVE  Angelique Cordon presents to [facility] due to tailbone/ low back pain. Pt declines any injury but does state the pain has improved significantly, although it is still a bit tender.  Patient reports it mostly hurts when she is sitting.  States it is her very lower tailbone area    Pt has stopped her wegovy as she is scheduled surgery next month.     Patient also notes she has a sore under the right side of her nostril, states this started about a week ago when she was sick and blowing her nose frequently, states she has applied Vaseline, hydrocortisone cream with no improvement.  States it is sore feeling and itchy.   Patient does have history of cold sores/fever blisters, but has never had 1 in this area    History of Present Illness  Past Medical History:   Diagnosis Date    Abnormal bone density screening     Anxiety     History of transfusion     Lipedema of lower extremity     LEGS AND ARMS    Pap smear for cervical cancer screening 2018    Varicose vein of leg     Visit for screening mammogram       Family History   Problem Relation Age of Onset    Diabetes Mother     Sleep apnea Mother     COPD Mother     Hypertension Mother     Colon cancer Father     Malig Hyperthermia Neg Hx       Past Surgical History:   Procedure Laterality Date    EXCISION LESION Left 4/1/2022    Procedure: EXCISION CYST LEFT AXILLARY;  Surgeon: Rosalie Talley MD;  [place] [facility] [place] [facility] [place]  Service: General;  Laterality: Left;    FOOT SURGERY Left 2003/2009 HAD 3 DIFFERENT SURGERIES    L FOOT/TENDON RELEASE PLANTAR FASIA    LIPOSUCTION Right 10/24/2023    Procedure: LIPOSUCTION, Right Lower Extremity Circumferential Liposuction with Skin Resection and negative pressure dressing;  Surgeon: Raffaele Willams MD;  [place] [facility] [place] [facility] [place]  Service: Plastics;  Laterality: Right;    LIPOSUCTION Left 4/19/2024    Procedure: LIPOSUCTION, Left Lower  "Extremity Circumferential Liposuction with Skin Resection and negative pressure dressing;  Surgeon: Raffaele Willams MD;  Location: [facility] OR [facility] [place];  Service: Plastics;  Laterality: Left;        Current Outpatient Medications:     ibuprofen (ADVIL,MOTRIN) 600 MG tablet, Take 1 tablet by mouth Every 8 (Eight) Hours As Needed for Mild Pain., Disp: 30 tablet, Rfl: 0    melatonin 5 MG tablet tablet, Take 1 tablet by mouth Every Night., Disp: , Rfl:     Vortioxetine HBr (Trintellix) 20 MG tablet, Take 1 tablet by mouth Daily., Disp: 90 tablet, Rfl: 1    Semaglutide-Weight Management (Wegovy) 1 MG/0.5ML solution auto-injector, Inject 0.5 mL under the skin into the appropriate area as directed 1 (One) Time Per Week. (Patient not taking: Reported on 9/12/2024), Disp: 2 mL, Rfl: 1    OBJECTIVE  Vital Signs:   /61   Pulse 99   Ht 152.4 cm (60\")   Wt 61.2 kg (135 lb)   SpO2 99%   BMI 26.37 kg/m²    Estimated body mass index is 26.37 kg/m² as calculated from the following:    Height as of this encounter: 152.4 cm (60\").    Weight as of this encounter: 61.2 kg (135 lb).     Wt Readings from Last 3 Encounters:   09/12/24 61.2 kg (135 lb)   08/29/24 63.5 kg (140 lb)   08/22/24 62.6 kg (138 lb)     BP Readings from Last 3 Encounters:   09/12/24 108/61   08/29/24 102/70   08/22/24 110/74       Physical Exam  Vitals reviewed.   Constitutional:       Appearance: Normal appearance. She is well-developed.   HENT:      Head: Normocephalic and atraumatic.      Right Ear: External ear normal.      Left Ear: External ear normal.   Eyes:      Conjunctiva/sclera: Conjunctivae normal.      Pupils: Pupils are equal, round, and reactive to light.   Cardiovascular:      Rate and Rhythm: Normal rate and regular rhythm.      Heart sounds: No murmur heard.     No friction rub. No gallop.   Pulmonary:      Effort: Pulmonary effort is normal.      Breath sounds: Normal breath sounds. No wheezing or rhonchi.   Skin:     General: Skin is " warm and dry.      Comments: Small area next to right nare noted to have patch of somewhat dry/crusted appearing papules on erythematous bed   Neurological:      Mental Status: She is alert and oriented to person, place, and time.      Cranial Nerves: No cranial nerve deficit.   Psychiatric:         Mood and Affect: Mood and affect normal.         Behavior: Behavior normal.         Thought Content: Thought content normal.         Judgment: Judgment normal.          Result Review        Mammo Diagnostic Digital Tomosynthesis Left With CAD    Result Date: 8/6/2024  OVERALL IMPRESSION: Benign left mammogram and focused left breast ultrasound.  BI-RADS ASSESSMENT: BI-RADS 2. Benign findings.  The breast density is heterogenously dense, which may obscure small masses.  Note:  It has been reported that there is approximately a 15% false negative in mammography.  Therefore, management of a palpable abnormality should not be deferred because of a negative mammogram.        Electronically Signed By-YOLI JIN MD On:8/6/2024 10:39 AM      US Breast Left Limited    Result Date: 8/6/2024  OVERALL IMPRESSION: Benign left mammogram and focused left breast ultrasound.  BI-RADS ASSESSMENT: BI-RADS 2. Benign findings.  The breast density is heterogenously dense, which may obscure small masses.  Note:  It has been reported that there is approximately a 15% false negative in mammography.  Therefore, management of a palpable abnormality should not be deferred because of a negative mammogram.        Electronically Signed By-YOLI JIN MD On:8/6/2024 10:39 AM      Mammo Screening Digital Tomosynthesis Bilateral With CAD    Result Date: 7/26/2024   1. Left breast: Need additional imaging. Recommend a left diagnostic mammogram and left breast ultrasound.  2. Right breast: Benign mammogram.  BI-RADS ASSESSMENT: BI-RADS 0. Incomplete.  Need Additional Imaging Evaluation and/or Prior Mammograms for Comparison.   The patient's  information is entered into a computerized reminder system with a targeted due date for the next mammogram.  Note:  It has been reported that there is approximately a 15% false negative rate in mammography.  Therefore, management of a palpable abnormality should not be deferred because of a negative mammogram.     Electronically Signed By-YOLI JIN MD On:7/26/2024 10:12 AM         The above data has been reviewed by ANDREI Olivera 09/12/2024 09:49 EDT.          Patient Care Team:  Hannah Parrish APRN as PCP - General (Nurse Practitioner)  Rosalie Talley MD as Consulting Physician (General Surgery)  Raffaele Willams MD as Consulting Physician (Plastic Surgery)            ASSESSMENT & PLAN    Diagnoses and all orders for this visit:    1. Low back pain, unspecified back pain laterality, unspecified chronicity, unspecified whether sciatica present (Primary)  -     XR Sacrum & Coccyx; Future    2. Skin lesion  Comments:  Discussed possible cold sore/fever blister vs dermatitis, patient has been treating with hydrocortisone, swab sent for HSV culture  Orders:  -     Herpes Simplex Virus Culture - Swab, Nose; Future         Tobacco Use: Low Risk  (9/12/2024)    Patient History     Smoking Tobacco Use: Never     Smokeless Tobacco Use: Never     Passive Exposure: Never       Follow Up     Return if symptoms worsen or fail to improve.        Patient was given instructions and counseling regarding her condition or for health maintenance advice. Please see specific information pulled into the AVS if appropriate.   I have reviewed information obtained and documented by others and I have confirmed the accuracy of this documented note.    ANDREI Olivera

## 2024-09-14 LAB — HSV SPEC CULT: NEGATIVE

## 2024-09-16 ENCOUNTER — OFFICE VISIT (OUTPATIENT)
Dept: PLASTIC SURGERY | Facility: CLINIC | Age: 44
End: 2024-09-16
Payer: OTHER GOVERNMENT

## 2024-09-16 VITALS
DIASTOLIC BLOOD PRESSURE: 73 MMHG | BODY MASS INDEX: 26.58 KG/M2 | SYSTOLIC BLOOD PRESSURE: 115 MMHG | WEIGHT: 135.4 LBS | OXYGEN SATURATION: 97 % | HEIGHT: 60 IN | HEART RATE: 88 BPM

## 2024-09-16 DIAGNOSIS — R60.0 LOCALIZED EDEMA: ICD-10-CM

## 2024-09-16 DIAGNOSIS — Z01.818 PRE-OPERATIVE EXAMINATION: Primary | ICD-10-CM

## 2024-09-16 DIAGNOSIS — R60.9 LIPEDEMA: ICD-10-CM

## 2024-09-16 PROCEDURE — 99212 OFFICE O/P EST SF 10 MIN: CPT | Performed by: SURGERY

## 2024-09-16 NOTE — H&P (VIEW-ONLY)
"Pre-op Exam (Pre op)            History of Present Illness  Angelique Cordon is a 44 y.o. female who presents to [facility] for Pre-Operative Examination for Liposuction to back and hips, skin excision with closure on 10/16/24.    Pt presents today for pre op.  Pt has already stopped taking Wegovy  Urine nicotine normal        Subjective        Patient has no known allergies.  Allergies Reconciled.    Review of Systems   All review of system has been reviewed and it  is negative except the ones note above.     Objective     /73 (BP Location: Left arm, Patient Position: Sitting, Cuff Size: Adult)   Pulse 88   Ht 152.4 cm (60\")   Wt 61.4 kg (135 lb 6.4 oz)   SpO2 97%   BMI 26.44 kg/m²     Body mass index is 26.44 kg/m².    Physical Exam    Result Review :                Assessment and Plan      Diagnoses and all orders for this visit:    1. Pre-operative examination (Primary)  -     cephalexin (KEFLEX) 500 MG capsule; Take 1 capsule by mouth 4 (Four) Times a Day for 5 days.  Dispense: 20 capsule; Refill: 0  -     Chlorhexidine Gluconate 4 % solution; Apply 1 Application topically to the appropriate area as directed Daily. Shower daily for 7 days prior to surgery  Dispense: 473 mL; Refill: 0  -     naproxen (NAPROSYN) 250 MG tablet; Take 1 tablet by mouth 2 (Two) Times a Day.  Dispense: 12 tablet; Refill: 0  -     gabapentin (Neurontin) 300 MG capsule; Take 1 capsule by mouth 3 (Three) Times a Day for 18 doses.  Dispense: 18 capsule; Refill: 0  -     acetaminophen (TYLENOL) 500 MG tablet; Take 2 tablets by mouth Every 6 (Six) Hours As Needed for Moderate Pain for up to 18 doses.  Dispense: 18 tablet; Refill: 0  -     ondansetron (Zofran) 4 MG tablet; Take 1 tablet by mouth Daily As Needed for Nausea or Vomiting for up to 18 doses.  Dispense: 18 tablet; Refill: 0    2. Lipedema    3. Localized edema        Plan:  Photos obtained  Given these options, the " patient has verbally expressed an understanding of the risks of surgery and finds these risks acceptable. We will proceed with surgery as soon as possible.    Medications sent to pharmacy  Urine nicotine normal 9/5/24      Scribed by Summer Egan, acting as a scribe for Raffaele Willams MD, 09/16/24 15:35 EDT.  Raffaele Willams MD's signature on the note affirms that the note adequately documents the care provided.          Follow Up     Return RTC 10/24 for 1 week post op.    Patient was given instructions and counseling regarding her condition. Please see specific information pulled into the AVS if appropriate.     Raffaele Willams MD  09/16/2024

## 2024-09-17 PROBLEM — Q17.9 EAR ANOMALY: Status: RESOLVED | Noted: 2024-05-30 | Resolved: 2024-09-17

## 2024-09-17 PROBLEM — T14.8XXA HEMATOMA: Status: RESOLVED | Noted: 2023-10-29 | Resolved: 2024-09-17

## 2024-09-17 RX ORDER — ONDANSETRON 4 MG/1
4 TABLET, FILM COATED ORAL DAILY PRN
Qty: 18 TABLET | Refills: 0 | Status: SHIPPED | OUTPATIENT
Start: 2024-09-17

## 2024-09-17 RX ORDER — CEPHALEXIN 500 MG/1
500 CAPSULE ORAL 4 TIMES DAILY
Qty: 20 CAPSULE | Refills: 0 | Status: SHIPPED | OUTPATIENT
Start: 2024-09-17 | End: 2024-09-22

## 2024-09-17 RX ORDER — GABAPENTIN 300 MG/1
300 CAPSULE ORAL 3 TIMES DAILY
Qty: 18 CAPSULE | Refills: 0 | Status: SHIPPED | OUTPATIENT
Start: 2024-09-17 | End: 2024-09-23

## 2024-09-17 RX ORDER — ACETAMINOPHEN 500 MG
1000 TABLET ORAL EVERY 6 HOURS PRN
Qty: 18 TABLET | Refills: 0 | Status: SHIPPED | OUTPATIENT
Start: 2024-09-17

## 2024-09-17 RX ORDER — CHLORHEXIDINE GLUCONATE 40 MG/ML
1 SOLUTION TOPICAL DAILY
Qty: 473 ML | Refills: 0 | Status: SHIPPED | OUTPATIENT
Start: 2024-09-17

## 2024-09-17 RX ORDER — NAPROXEN 250 MG/1
250 TABLET ORAL 2 TIMES DAILY
Qty: 12 TABLET | Refills: 0 | Status: SHIPPED | OUTPATIENT
Start: 2024-09-17

## 2024-09-18 ENCOUNTER — TELEPHONE (OUTPATIENT)
Dept: PLASTIC SURGERY | Facility: CLINIC | Age: 44
End: 2024-09-18
Payer: OTHER GOVERNMENT

## 2024-09-20 ENCOUNTER — PATIENT MESSAGE (OUTPATIENT)
Dept: FAMILY MEDICINE CLINIC | Facility: CLINIC | Age: 44
End: 2024-09-20
Payer: OTHER GOVERNMENT

## 2024-09-20 ENCOUNTER — TELEPHONE (OUTPATIENT)
Dept: FAMILY MEDICINE CLINIC | Facility: CLINIC | Age: 44
End: 2024-09-20
Payer: OTHER GOVERNMENT

## 2024-09-20 DIAGNOSIS — L98.9 SKIN LESION: Primary | ICD-10-CM

## 2024-09-30 DIAGNOSIS — L98.9 SKIN LESION: Primary | ICD-10-CM

## 2024-09-30 RX ORDER — MICONAZOLE NITRATE 20 MG/G
1 CREAM TOPICAL 2 TIMES DAILY
Qty: 35 G | Refills: 0 | Status: SHIPPED | OUTPATIENT
Start: 2024-09-30

## 2024-10-04 ENCOUNTER — PRE-ADMISSION TESTING (OUTPATIENT)
Dept: PREADMISSION TESTING | Facility: HOSPITAL | Age: 44
End: 2024-10-04
Payer: OTHER GOVERNMENT

## 2024-10-04 VITALS
DIASTOLIC BLOOD PRESSURE: 66 MMHG | TEMPERATURE: 98.5 F | HEIGHT: 60 IN | OXYGEN SATURATION: 97 % | BODY MASS INDEX: 27.53 KG/M2 | RESPIRATION RATE: 18 BRPM | SYSTOLIC BLOOD PRESSURE: 118 MMHG | HEART RATE: 96 BPM | WEIGHT: 140.21 LBS

## 2024-10-04 RX ORDER — CEPHALEXIN 500 MG/1
500 CAPSULE ORAL 4 TIMES DAILY
COMMUNITY

## 2024-10-04 NOTE — DISCHARGE INSTRUCTIONS
IMPORTANT INSTRUCTIONS - PRE-ADMISSION TESTING  DO NOT EAT, DRINK OR CHEW anything after midnight the night before your procedure.     Take the following medications the morning of your procedure with JUST A SIP OF WATER:  TRINTELLIX    DO NOT BRING your medications to the hospital with you, UNLESS something has changed since your PRE-Admission Testing appointment.  Hold all vitamins, supplements, and NSAIDS (Non- steroidal anti-inflammatory meds) for one week prior to surgery (you MAY take Tylenol or Acetaminophen).  If you are diabetic, check your blood sugar the morning of your procedure. If it is less than 70 or if you are feeling symptomatic, call the following number for further instructions: [phone] SAME DAY SURGERY.  Use your inhalers/nebulizers as usual, the morning of your procedure. BRING YOUR INHALERS with you.   Bring your CPAP or BIPAP to hospital, ONLY IF YOU WILL BE SPENDING THE NIGHT.   Make sure you have a ride home and have someone who will stay with you the day of your procedure after you go home.  If you have any questions, please call your Pre-Admission Testing NurseASH at [phone].   Per anesthesia request, do not smoke for 24 hours before your procedure or as instructed by your surgeon.      PREOPERATIVE (BEFORE SURGERY)              BATHING INSTRUCTIONS  Instructions:    You will need to shower per Dr. Willams's instructions- begin 7 days prior to procedure     Wash your hair and face with normal shampoo and soap, rinse it well before using the surgical soap.      In the shower, wet the skin completely with water from your neck to your feet. Apply the cleanser to your   body ONLY FROM THE NECK TO YOUR FEET.     Do NOT USE THE CLEANSER ON YOUR FACE, HEAD, OR GENITAL (PRIVATE) AREAS.   Keep it out of your eyes, ears, and mouth because of the risk of injury to those areas.      Scrub with a clean washcloth for each bath utilizing the soap provided from the top of your body to  the   bottom starting at the neck area.      Pay close attention to your armpits, groin area, and the site of surgery.      Wash your body gently for 5 minutes. Stand outside the stream or turn off the water while scrubbing your   body. Do NOT wash with your regular soap after the surgical cleanser is used.      RINSE THE CLEANSER OFF COMPLETELY with plenty of water. Rinse the area again thoroughly.      Dry off with a clean towel. The surgical soap can cause dryness; however do NOT APPLY LOTION,   CREAM, POWDER, and/or DEODORANT AFTER SHOWERING.     Be sure to where clean clothes after showering.      Ensure CLEAN BED LINENS AFTER FIRST wash with the surgical soap.      NO PETS ALLOWED IN THE BED with you after utilizing the surgical soap.

## 2024-10-15 NOTE — PROGRESS NOTES
"Chief Complaint  No chief complaint on file.    Subjective          History of Present Illness  Angelique Cordon is a 44 y.o. female who presents to [facility] for Postoperative Follow-Up of LIPOSUCTION BACK, hip liposuction with skin excision and closure, possible incisional vacuum dressing 10/16/24.    Pt presents today for 1w post op.    Allergies: Patient has no known allergies.  Allergies Reconciled.    Review of Systems   All review of system has been reviewed and it  is negative except the ones note above.     Objective     /70 (BP Location: Left arm, Patient Position: Sitting, Cuff Size: Adult)   Pulse 110   Ht 152.4 cm (60\")   Wt 63.6 kg (140 lb 3.2 oz)   SpO2 96%   BMI 27.38 kg/m²     Body mass index is 27.38 kg/m².    Physical Exam  General Inspection: Incision healing well, no swelling, no erythema, no drainage.    Result Review :                Assessment and Plan      Diagnoses and all orders for this visit:    1. Lipedema (Primary)        Plan:     Assessment & Plan  1. Postoperative visit.  The dressing was changed during the visit. She was advised to return if the drain output decreases to less than 20 cc for two consecutive days, at which point the drain may be removed. The use of vitamin E oil was recommended for scar management. She was also encouraged to gradually increase her activity level, including walking around the house.    Follow-up  Return in 2 weeks for follow up.               Follow Up     No follow-ups on file.    Patient was given instructions and counseling regarding her condition. Please see specific information pulled into the AVS if appropriate.     Raffaele Willams MD  10/24/2024      Patient or patient representative verbalized consent for the use of Ambient Listening during the visit with  Raffaele Willams MD for chart documentation. 10/29/2024  07:42 EDT    Answers submitted by the patient for this " visit:  Other (Submitted on 10/19/2024)  Please describe your symptoms.: Follow up for surgery  Have you had these symptoms before?: Yes  How long have you been having these symptoms?: Greater than 2 weeks  Please describe any probable cause for these symptoms. : Lipedema  Primary Reason for Visit (Submitted on 10/19/2024)  What is the primary reason for your visit?: Problem Not Listed

## 2024-10-16 ENCOUNTER — ANESTHESIA EVENT (OUTPATIENT)
Dept: PERIOP | Facility: HOSPITAL | Age: 44
End: 2024-10-16
Payer: OTHER GOVERNMENT

## 2024-10-16 ENCOUNTER — ANESTHESIA (OUTPATIENT)
Dept: PERIOP | Facility: HOSPITAL | Age: 44
End: 2024-10-16
Payer: OTHER GOVERNMENT

## 2024-10-16 ENCOUNTER — HOSPITAL ENCOUNTER (OUTPATIENT)
Facility: HOSPITAL | Age: 44
Discharge: HOME | End: 2024-10-17
Attending: SURGERY | Admitting: SURGERY
Payer: OTHER GOVERNMENT

## 2024-10-16 DIAGNOSIS — R60.9 LIPEDEMA: Primary | ICD-10-CM

## 2024-10-16 LAB
B-HCG UR QL: NEGATIVE
COTININE UR-MCNC: NEGATIVE NG/ML

## 2024-10-16 PROCEDURE — 25010000002 HYDROMORPHONE 1 MG/ML SOLUTION

## 2024-10-16 PROCEDURE — 25010000002 ONDANSETRON PER 1 MG

## 2024-10-16 PROCEDURE — 94799 UNLISTED PULMONARY SVC/PX: CPT

## 2024-10-16 PROCEDURE — 25010000002 PROCHLORPERAZINE 10 MG/2ML SOLUTION: Performed by: ANESTHESIOLOGY

## 2024-10-16 PROCEDURE — 81025 URINE PREGNANCY TEST: CPT | Performed by: ANESTHESIOLOGY

## 2024-10-16 PROCEDURE — G0480 DRUG TEST DEF 1-7 CLASSES: HCPCS | Performed by: SURGERY

## 2024-10-16 PROCEDURE — 25010000002 EPINEPHRINE (ANAPHYLAXIS) 1 MG/ML SOLUTION: Performed by: SURGERY

## 2024-10-16 PROCEDURE — 25010000002 ESMOLOL 100 MG/10ML SOLUTION

## 2024-10-16 PROCEDURE — 25010000002 CEFAZOLIN PER 500 MG: Performed by: SURGERY

## 2024-10-16 PROCEDURE — 94761 N-INVAS EAR/PLS OXIMETRY MLT: CPT

## 2024-10-16 PROCEDURE — 25010000002 LIDOCAINE PF 2% 2 % SOLUTION

## 2024-10-16 PROCEDURE — 25010000002 SUGAMMADEX 200 MG/2ML SOLUTION

## 2024-10-16 PROCEDURE — 25010000002 PROPOFOL 200 MG/20ML EMULSION

## 2024-10-16 PROCEDURE — 25010000002 MIDAZOLAM PER 1MG: Performed by: ANESTHESIOLOGY

## 2024-10-16 PROCEDURE — 25810000003 LACTATED RINGERS PER 1000 ML: Performed by: ANESTHESIOLOGY

## 2024-10-16 PROCEDURE — 25010000002 FENTANYL CITRATE (PF) 50 MCG/ML SOLUTION

## 2024-10-16 PROCEDURE — 25010000002 DEXAMETHASONE PER 1 MG

## 2024-10-16 PROCEDURE — 25010000002 KETOROLAC TROMETHAMINE PER 15 MG: Performed by: SURGERY

## 2024-10-16 RX ORDER — OXYCODONE HYDROCHLORIDE 5 MG/1
5 TABLET ORAL
Status: DISCONTINUED | OUTPATIENT
Start: 2024-10-16 | End: 2024-10-16 | Stop reason: HOSPADM

## 2024-10-16 RX ORDER — ONDANSETRON 2 MG/ML
4 INJECTION INTRAMUSCULAR; INTRAVENOUS ONCE AS NEEDED
Status: DISCONTINUED | OUTPATIENT
Start: 2024-10-16 | End: 2024-10-16 | Stop reason: HOSPADM

## 2024-10-16 RX ORDER — FENTANYL CITRATE 50 UG/ML
INJECTION, SOLUTION INTRAMUSCULAR; INTRAVENOUS AS NEEDED
Status: DISCONTINUED | OUTPATIENT
Start: 2024-10-16 | End: 2024-10-16 | Stop reason: SURG

## 2024-10-16 RX ORDER — PROMETHAZINE HYDROCHLORIDE 25 MG/1
25 SUPPOSITORY RECTAL ONCE AS NEEDED
Status: DISCONTINUED | OUTPATIENT
Start: 2024-10-16 | End: 2024-10-16 | Stop reason: HOSPADM

## 2024-10-16 RX ORDER — PROPOFOL 10 MG/ML
INJECTION, EMULSION INTRAVENOUS AS NEEDED
Status: DISCONTINUED | OUTPATIENT
Start: 2024-10-16 | End: 2024-10-16 | Stop reason: SURG

## 2024-10-16 RX ORDER — ONDANSETRON 2 MG/ML
INJECTION INTRAMUSCULAR; INTRAVENOUS AS NEEDED
Status: DISCONTINUED | OUTPATIENT
Start: 2024-10-16 | End: 2024-10-16 | Stop reason: SURG

## 2024-10-16 RX ORDER — PROMETHAZINE HYDROCHLORIDE 12.5 MG/1
25 TABLET ORAL ONCE AS NEEDED
Status: DISCONTINUED | OUTPATIENT
Start: 2024-10-16 | End: 2024-10-16 | Stop reason: HOSPADM

## 2024-10-16 RX ORDER — PHENYLEPHRINE HCL IN 0.9% NACL 1 MG/10 ML
SYRINGE (ML) INTRAVENOUS AS NEEDED
Status: DISCONTINUED | OUTPATIENT
Start: 2024-10-16 | End: 2024-10-16 | Stop reason: SURG

## 2024-10-16 RX ORDER — ACETAMINOPHEN 650 MG/1
650 SUPPOSITORY RECTAL EVERY 4 HOURS PRN
Status: DISCONTINUED | OUTPATIENT
Start: 2024-10-16 | End: 2024-10-17 | Stop reason: HOSPADM

## 2024-10-16 RX ORDER — DEXTROSE MONOHYDRATE AND SODIUM CHLORIDE 5; .45 G/100ML; G/100ML
75 INJECTION, SOLUTION INTRAVENOUS CONTINUOUS
Status: DISCONTINUED | OUTPATIENT
Start: 2024-10-16 | End: 2024-10-17

## 2024-10-16 RX ORDER — ONDANSETRON 2 MG/ML
4 INJECTION INTRAMUSCULAR; INTRAVENOUS EVERY 6 HOURS PRN
Status: DISCONTINUED | OUTPATIENT
Start: 2024-10-16 | End: 2024-10-17 | Stop reason: HOSPADM

## 2024-10-16 RX ORDER — KETOROLAC TROMETHAMINE 30 MG/ML
30 INJECTION, SOLUTION INTRAMUSCULAR; INTRAVENOUS EVERY 6 HOURS PRN
Status: DISCONTINUED | OUTPATIENT
Start: 2024-10-16 | End: 2024-10-17 | Stop reason: HOSPADM

## 2024-10-16 RX ORDER — AMOXICILLIN 250 MG
2 CAPSULE ORAL 2 TIMES DAILY
Status: DISCONTINUED | OUTPATIENT
Start: 2024-10-16 | End: 2024-10-17 | Stop reason: HOSPADM

## 2024-10-16 RX ORDER — TRANEXAMIC ACID 100 MG/ML
INJECTION, SOLUTION INTRAVENOUS AS NEEDED
Status: DISCONTINUED | OUTPATIENT
Start: 2024-10-16 | End: 2024-10-16 | Stop reason: SURG

## 2024-10-16 RX ORDER — MEPERIDINE HYDROCHLORIDE 25 MG/ML
12.5 INJECTION INTRAMUSCULAR; INTRAVENOUS; SUBCUTANEOUS
Status: DISCONTINUED | OUTPATIENT
Start: 2024-10-16 | End: 2024-10-16 | Stop reason: HOSPADM

## 2024-10-16 RX ORDER — TRANEXAMIC ACID 10 MG/ML
1000 INJECTION, SOLUTION INTRAVENOUS
Status: COMPLETED | OUTPATIENT
Start: 2024-10-16 | End: 2024-10-16

## 2024-10-16 RX ORDER — EPINEPHRINE 1 MG/ML
INJECTION, SOLUTION INTRAMUSCULAR; SUBCUTANEOUS AS NEEDED
Status: DISCONTINUED | OUTPATIENT
Start: 2024-10-16 | End: 2024-10-16 | Stop reason: HOSPADM

## 2024-10-16 RX ORDER — ROCURONIUM BROMIDE 10 MG/ML
INJECTION, SOLUTION INTRAVENOUS AS NEEDED
Status: DISCONTINUED | OUTPATIENT
Start: 2024-10-16 | End: 2024-10-16 | Stop reason: SURG

## 2024-10-16 RX ORDER — GABAPENTIN 300 MG/1
300 CAPSULE ORAL 3 TIMES DAILY
Status: DISCONTINUED | OUTPATIENT
Start: 2024-10-16 | End: 2024-10-17 | Stop reason: HOSPADM

## 2024-10-16 RX ORDER — ESMOLOL HYDROCHLORIDE 10 MG/ML
INJECTION INTRAVENOUS AS NEEDED
Status: DISCONTINUED | OUTPATIENT
Start: 2024-10-16 | End: 2024-10-16 | Stop reason: SURG

## 2024-10-16 RX ORDER — ACETAMINOPHEN 500 MG
1000 TABLET ORAL ONCE
Status: COMPLETED | OUTPATIENT
Start: 2024-10-16 | End: 2024-10-16

## 2024-10-16 RX ORDER — ACETAMINOPHEN 325 MG/1
650 TABLET ORAL EVERY 4 HOURS PRN
Status: DISCONTINUED | OUTPATIENT
Start: 2024-10-16 | End: 2024-10-17 | Stop reason: HOSPADM

## 2024-10-16 RX ORDER — SODIUM CHLORIDE, SODIUM LACTATE, POTASSIUM CHLORIDE, CALCIUM CHLORIDE 600; 310; 30; 20 MG/100ML; MG/100ML; MG/100ML; MG/100ML
9 INJECTION, SOLUTION INTRAVENOUS CONTINUOUS PRN
Status: DISCONTINUED | OUTPATIENT
Start: 2024-10-16 | End: 2024-10-16 | Stop reason: HOSPADM

## 2024-10-16 RX ORDER — PROCHLORPERAZINE EDISYLATE 5 MG/ML
5 INJECTION INTRAMUSCULAR; INTRAVENOUS ONCE
Status: COMPLETED | OUTPATIENT
Start: 2024-10-16 | End: 2024-10-16

## 2024-10-16 RX ORDER — SCOLOPAMINE TRANSDERMAL SYSTEM 1 MG/1
1 PATCH, EXTENDED RELEASE TRANSDERMAL ONCE
Status: DISCONTINUED | OUTPATIENT
Start: 2024-10-16 | End: 2024-10-16

## 2024-10-16 RX ORDER — PETROLATUM,WHITE
OINTMENT IN PACKET (GRAM) TOPICAL AS NEEDED
Status: DISCONTINUED | OUTPATIENT
Start: 2024-10-16 | End: 2024-10-16 | Stop reason: SURG

## 2024-10-16 RX ORDER — OXYCODONE AND ACETAMINOPHEN 5; 325 MG/1; MG/1
1 TABLET ORAL EVERY 4 HOURS PRN
Status: DISCONTINUED | OUTPATIENT
Start: 2024-10-16 | End: 2024-10-17 | Stop reason: HOSPADM

## 2024-10-16 RX ORDER — SCOLOPAMINE TRANSDERMAL SYSTEM 1 MG/1
1 PATCH, EXTENDED RELEASE TRANSDERMAL CONTINUOUS
Status: DISCONTINUED | OUTPATIENT
Start: 2024-10-16 | End: 2024-10-16

## 2024-10-16 RX ORDER — POLYETHYLENE GLYCOL 3350 17 G/17G
17 POWDER, FOR SOLUTION ORAL DAILY
Status: DISCONTINUED | OUTPATIENT
Start: 2024-10-16 | End: 2024-10-17 | Stop reason: HOSPADM

## 2024-10-16 RX ORDER — LIDOCAINE HYDROCHLORIDE 20 MG/ML
INJECTION, SOLUTION EPIDURAL; INFILTRATION; INTRACAUDAL; PERINEURAL AS NEEDED
Status: DISCONTINUED | OUTPATIENT
Start: 2024-10-16 | End: 2024-10-16 | Stop reason: SURG

## 2024-10-16 RX ORDER — DEXAMETHASONE SODIUM PHOSPHATE 4 MG/ML
INJECTION, SOLUTION INTRA-ARTICULAR; INTRALESIONAL; INTRAMUSCULAR; INTRAVENOUS; SOFT TISSUE AS NEEDED
Status: DISCONTINUED | OUTPATIENT
Start: 2024-10-16 | End: 2024-10-16 | Stop reason: SURG

## 2024-10-16 RX ORDER — ONDANSETRON 4 MG/1
4 TABLET, ORALLY DISINTEGRATING ORAL EVERY 6 HOURS PRN
Status: DISCONTINUED | OUTPATIENT
Start: 2024-10-16 | End: 2024-10-17 | Stop reason: HOSPADM

## 2024-10-16 RX ORDER — CEPHALEXIN 500 MG/1
500 CAPSULE ORAL 4 TIMES DAILY
Status: DISCONTINUED | OUTPATIENT
Start: 2024-10-16 | End: 2024-10-17 | Stop reason: HOSPADM

## 2024-10-16 RX ORDER — ACETAMINOPHEN 500 MG
1000 TABLET ORAL ONCE
Status: DISCONTINUED | OUTPATIENT
Start: 2024-10-16 | End: 2024-10-16

## 2024-10-16 RX ORDER — NALOXONE HCL 0.4 MG/ML
0.1 VIAL (ML) INJECTION
Status: DISCONTINUED | OUTPATIENT
Start: 2024-10-16 | End: 2024-10-17 | Stop reason: HOSPADM

## 2024-10-16 RX ORDER — DEXMEDETOMIDINE HYDROCHLORIDE 100 UG/ML
INJECTION, SOLUTION INTRAVENOUS AS NEEDED
Status: DISCONTINUED | OUTPATIENT
Start: 2024-10-16 | End: 2024-10-16 | Stop reason: SURG

## 2024-10-16 RX ORDER — MIDAZOLAM HYDROCHLORIDE 2 MG/2ML
2 INJECTION, SOLUTION INTRAMUSCULAR; INTRAVENOUS ONCE
Status: COMPLETED | OUTPATIENT
Start: 2024-10-16 | End: 2024-10-16

## 2024-10-16 RX ADMIN — Medication 100 MCG: at 11:14

## 2024-10-16 RX ADMIN — DEXMEDETOMIDINE HYDROCHLORIDE 20 MCG: 100 INJECTION, SOLUTION, CONCENTRATE INTRAVENOUS at 09:02

## 2024-10-16 RX ADMIN — DEXAMETHASONE SODIUM PHOSPHATE 8 MG: 4 INJECTION, SOLUTION INTRAMUSCULAR; INTRAVENOUS at 07:57

## 2024-10-16 RX ADMIN — PROPOFOL 150 MG: 10 INJECTION, EMULSION INTRAVENOUS at 07:46

## 2024-10-16 RX ADMIN — DEXMEDETOMIDINE HYDROCHLORIDE 20 MCG: 100 INJECTION, SOLUTION, CONCENTRATE INTRAVENOUS at 11:00

## 2024-10-16 RX ADMIN — ROCURONIUM BROMIDE 30 MG: 10 INJECTION, SOLUTION INTRAVENOUS at 08:33

## 2024-10-16 RX ADMIN — Medication 200 MCG: at 10:17

## 2024-10-16 RX ADMIN — Medication 200 MCG: at 07:46

## 2024-10-16 RX ADMIN — SODIUM CHLORIDE, POTASSIUM CHLORIDE, SODIUM LACTATE AND CALCIUM CHLORIDE 9 ML/HR: 600; 310; 30; 20 INJECTION, SOLUTION INTRAVENOUS at 07:36

## 2024-10-16 RX ADMIN — ROCURONIUM BROMIDE 20 MG: 10 INJECTION, SOLUTION INTRAVENOUS at 09:27

## 2024-10-16 RX ADMIN — TRANEXAMIC ACID 1000 MG: 100 INJECTION, SOLUTION INTRAVENOUS at 07:57

## 2024-10-16 RX ADMIN — ROCURONIUM BROMIDE 20 MG: 10 INJECTION, SOLUTION INTRAVENOUS at 08:54

## 2024-10-16 RX ADMIN — SODIUM CHLORIDE 2000 MG: 9 INJECTION, SOLUTION INTRAVENOUS at 11:52

## 2024-10-16 RX ADMIN — FENTANYL CITRATE 100 MCG: 50 INJECTION, SOLUTION INTRAMUSCULAR; INTRAVENOUS at 07:46

## 2024-10-16 RX ADMIN — Medication 1 PACKAGE: at 12:14

## 2024-10-16 RX ADMIN — POLYETHYLENE GLYCOL 3350 17 G: 17 POWDER, FOR SOLUTION ORAL at 16:21

## 2024-10-16 RX ADMIN — SODIUM CHLORIDE 2000 MG: 9 INJECTION, SOLUTION INTRAVENOUS at 07:57

## 2024-10-16 RX ADMIN — CEPHALEXIN 500 MG: 500 CAPSULE ORAL at 18:40

## 2024-10-16 RX ADMIN — KETOROLAC TROMETHAMINE 30 MG: 30 INJECTION, SOLUTION INTRAMUSCULAR; INTRAVENOUS at 21:31

## 2024-10-16 RX ADMIN — TRANEXAMIC ACID 1000 MG: 10 INJECTION, SOLUTION INTRAVENOUS at 07:36

## 2024-10-16 RX ADMIN — ROCURONIUM BROMIDE 20 MG: 10 INJECTION, SOLUTION INTRAVENOUS at 10:36

## 2024-10-16 RX ADMIN — Medication 5 MG: at 21:26

## 2024-10-16 RX ADMIN — ROCURONIUM BROMIDE 50 MG: 10 INJECTION, SOLUTION INTRAVENOUS at 07:46

## 2024-10-16 RX ADMIN — LIDOCAINE HYDROCHLORIDE 80 MG: 20 INJECTION, SOLUTION EPIDURAL; INFILTRATION; INTRACAUDAL; PERINEURAL at 07:46

## 2024-10-16 RX ADMIN — TRANEXAMIC ACID 20 MG: 100 INJECTION, SOLUTION INTRAVENOUS at 09:27

## 2024-10-16 RX ADMIN — ACETAMINOPHEN 1000 MG: 500 TABLET ORAL at 07:22

## 2024-10-16 RX ADMIN — Medication 100 MCG: at 10:25

## 2024-10-16 RX ADMIN — ESMOLOL HYDROCHLORIDE 20 MG: 100 INJECTION, SOLUTION INTRAVENOUS at 09:33

## 2024-10-16 RX ADMIN — GABAPENTIN 300 MG: 300 CAPSULE ORAL at 16:21

## 2024-10-16 RX ADMIN — FENTANYL CITRATE 100 MCG: 50 INJECTION, SOLUTION INTRAMUSCULAR; INTRAVENOUS at 11:11

## 2024-10-16 RX ADMIN — DEXTROSE AND SODIUM CHLORIDE 75 ML/HR: 5; 450 INJECTION, SOLUTION INTRAVENOUS at 16:21

## 2024-10-16 RX ADMIN — SCOPALAMINE 1 PATCH: 1 PATCH, EXTENDED RELEASE TRANSDERMAL at 07:21

## 2024-10-16 RX ADMIN — HYDROMORPHONE HYDROCHLORIDE 0.5 MG: 1 INJECTION, SOLUTION INTRAMUSCULAR; INTRAVENOUS; SUBCUTANEOUS at 11:03

## 2024-10-16 RX ADMIN — MIDAZOLAM HYDROCHLORIDE 2 MG: 1 INJECTION, SOLUTION INTRAMUSCULAR; INTRAVENOUS at 07:35

## 2024-10-16 RX ADMIN — SODIUM CHLORIDE, POTASSIUM CHLORIDE, SODIUM LACTATE AND CALCIUM CHLORIDE: 600; 310; 30; 20 INJECTION, SOLUTION INTRAVENOUS at 11:12

## 2024-10-16 RX ADMIN — ONDANSETRON 4 MG: 2 INJECTION INTRAMUSCULAR; INTRAVENOUS at 12:02

## 2024-10-16 RX ADMIN — ONDANSETRON 4 MG: 2 INJECTION INTRAMUSCULAR; INTRAVENOUS at 12:47

## 2024-10-16 RX ADMIN — OXYCODONE HYDROCHLORIDE AND ACETAMINOPHEN 1 TABLET: 5; 325 TABLET ORAL at 16:21

## 2024-10-16 RX ADMIN — PROCHLORPERAZINE EDISYLATE 5 MG: 5 INJECTION INTRAMUSCULAR; INTRAVENOUS at 13:20

## 2024-10-16 RX ADMIN — ESMOLOL HYDROCHLORIDE 20 MG: 100 INJECTION, SOLUTION INTRAVENOUS at 09:17

## 2024-10-16 RX ADMIN — CEPHALEXIN 500 MG: 500 CAPSULE ORAL at 21:26

## 2024-10-16 RX ADMIN — GABAPENTIN 300 MG: 300 CAPSULE ORAL at 21:26

## 2024-10-16 RX ADMIN — SUGAMMADEX 200 MG: 100 INJECTION, SOLUTION INTRAVENOUS at 12:10

## 2024-10-16 RX ADMIN — HYDROMORPHONE HYDROCHLORIDE 0.25 MG: 1 INJECTION, SOLUTION INTRAMUSCULAR; INTRAVENOUS; SUBCUTANEOUS at 13:35

## 2024-10-16 RX ADMIN — HYDROMORPHONE HYDROCHLORIDE 0.5 MG: 1 INJECTION, SOLUTION INTRAMUSCULAR; INTRAVENOUS; SUBCUTANEOUS at 08:26

## 2024-10-16 NOTE — INTERVAL H&P NOTE
H&P reviewed. The patient was examined and there are no changes to the H&P.    Patient is not tachycardic  Respirations are non elaborated  Vitals:    10/16/24 0630   BP: 111/74   Pulse: 77   Resp: 18   Temp: 97.4 °F (36.3 °C)   SpO2: 100%     Risks and benefits reminded to patient who agrees to proceed

## 2024-10-16 NOTE — OP NOTE
Plastic Surgery Op Note  HIP AND SKIN EXCISION OF THE HIPS AND BACK , UPPER LEGS     Angelique Cordon  10/16/2024    Pre-op Diagnosis:   Lipedema [R60.9]  Localized edema [R60.0]  Pain in both lower extremities [M79.604, M79.605]  Difficulty walking [R26.2]    Post-Op Diagnosis Codes:     * Lipedema [R60.9]     * Localized edema [R60.0]     * Pain in both lower extremities [M79.604, M79.605]     * Difficulty walking [R26.2]  Disproportion between native and reconstructed breast     Anesthesia: General    Staff:   Circulator: Savannah Fuentes RN  Scrub Person: Song Williamson  Assistant: Patricia Ventura RN CSA  Other: Ana Navarrete RN    Surgeon: Dr Willams  First Assistant: ADAN Meneses who was instrumental in helping with hemostasis, visualization and retraction structures as well as surgical closure.  Her skilled assistance was necessary for the success of this case.      Estimated Blood Loss: 400 mL    Specimens:   Order Name Source Comment Collection Info Order Time   NICOTINE SCREEN, URINE Urine, Clean Catch  Collected By: Shonda, April 10/16/2024  6:29 AM     Release to patient   Routine Release        PREGNANCY, URINE Urine, Clean Catch  Collected By: Shonda, April 10/16/2024  6:29 AM     Release to patient   Routine Release        PREGNANCY, URINE Urine, Clean Catch   10/16/2024  6:44 AM     Release to patient   Routine Release              Drains:   Closed/Suction Drain 1 Right;Anterior Hip Bulb 15 Fr. (Active)       Closed/Suction Drain 1 Left;Anterior Hip Bulb 15 Fr. (Active)       [REMOVED] Closed/Suction Drain Left Thigh (Removed)       Findings:     Tumescent: 3L  Lipoaspirate: 5700 cc total  Skin excision: 1055g    Implants:          Complications: none     After risks and benefits were discussed with patient and informed consent was signed, patient was taken to the OR and positioned supine. She was anesthetized and positioned prone. The surgical site was then prepped in a  sterile fashion way and a time out was performed confirming patient's name and procedure to be performed. All surgical team was introduced by name.   I started with infusion of tumescent and liposuction of both hips. Then, I excised the excess of skin of the lower portion of her hips. Then, the incisions were closed with insorb followed by 3-0 vicryl. Patient was flipped supine and I repeated the process anteriorly. Since she had vertical excess of skin, I needed to perform an additional excision of the hips vertically. Drains were placed and the skin was closed with insorb followed by 3-0 vicryl. Surgical dressings were applied and the abdominal binder was placed over the hips.   Patient tolerated procedure well, there were no complications, all instruments were checked and patient was awakened and taken to PACU in stable condition.  I was present for the entire procedure.     Date: 10/16/2024  Time: 12:26 EDT           Raffaele Willams MD  10/16/24  12:26 EDT

## 2024-10-16 NOTE — ANESTHESIA POSTPROCEDURE EVALUATION
Patient: Angelique Cordon    Procedure Summary       Date: 10/16/24 Room / Location: [facility] OR 02 / [facility] MAIN OR    Anesthesia Start: 0741 Anesthesia Stop: 1231    Procedure: LIPOSUCTION BACK, hip liposuction with skin excision and closure (Bilateral) Diagnosis:       Lipedema      Localized edema      Pain in both lower extremities      Difficulty walking      (Lipedema [R60.9])      (Localized edema [R60.0])      (Pain in both lower extremities [M79.604, M79.605])      (Difficulty walking [R26.2])    Surgeons: Raffaele Willams MD Provider: Song Paula MD    Anesthesia Type: general ASA Status: 1            Anesthesia Type: general    Vitals  Vitals Value Taken Time   /55 10/16/24 1315   Temp 35.9 °C (96.6 °F) 10/16/24 1223   Pulse 94 10/16/24 1316   Resp 15 10/16/24 1258   SpO2 97 % 10/16/24 1316   Vitals shown include unfiled device data.        Post Anesthesia Care and Evaluation    Patient location during evaluation: bedside  Patient participation: complete - patient participated  Level of consciousness: awake    Airway patency: patent  PONV Status: none  Cardiovascular status: acceptable  Respiratory status: acceptable  Hydration status: acceptable

## 2024-10-16 NOTE — ANESTHESIA PREPROCEDURE EVALUATION
Anesthesia Evaluation     Patient summary reviewed and Nursing notes reviewed   no history of anesthetic complications:   NPO Solid Status: > 8 hours  NPO Liquid Status: > 2 hours           Airway   Mallampati: I  TM distance: >3 FB  Neck ROM: full  No difficulty expected  Dental - normal exam     Pulmonary - negative pulmonary ROS and normal exam    breath sounds clear to auscultation  Cardiovascular - negative cardio ROS and normal exam  Exercise tolerance: good (4-7 METS)    Rhythm: regular  Rate: normal        Neuro/Psych- negative ROS  (+) psychiatric history Depression  GI/Hepatic/Renal/Endo - negative ROS     Musculoskeletal (-) negative ROS    Abdominal  - normal exam   Substance History - negative use     OB/GYN negative ob/gyn ROS         Other - negative ROS       ROS/Med Hx Other: PAT Nursing Notes unavailable.                     Anesthesia Plan    ASA 1     general     intravenous induction     Anesthetic plan, risks, benefits, and alternatives have been provided, discussed and informed consent has been obtained with: patient.    Plan discussed with CRNA.        CODE STATUS:

## 2024-10-16 NOTE — PLAN OF CARE
Goal Outcome Evaluation:  Plan of Care Reviewed With: patient        Progress: improving  Outcome Evaluation: Tolerating regular diet, c/o bilateral hip pain, pain meds given x1, effective, up to bed and bedside commode, urinating clear yellow urine, passing gas.

## 2024-10-17 VITALS
SYSTOLIC BLOOD PRESSURE: 93 MMHG | OXYGEN SATURATION: 98 % | HEIGHT: 60 IN | BODY MASS INDEX: 28.22 KG/M2 | WEIGHT: 143.74 LBS | DIASTOLIC BLOOD PRESSURE: 59 MMHG | RESPIRATION RATE: 16 BRPM | HEART RATE: 85 BPM | TEMPERATURE: 98.6 F

## 2024-10-17 LAB
ANION GAP SERPL CALCULATED.3IONS-SCNC: 5.5 MMOL/L (ref 5–15)
BASOPHILS # BLD AUTO: 0.01 10*3/MM3 (ref 0–0.2)
BASOPHILS NFR BLD AUTO: 0.1 % (ref 0–1.5)
BUN SERPL-MCNC: 9 MG/DL (ref 6–20)
BUN/CREAT SERPL: 13 (ref 7–25)
CALCIUM SPEC-SCNC: 8.1 MG/DL (ref 8.6–10.5)
CHLORIDE SERPL-SCNC: 108 MMOL/L (ref 98–107)
CO2 SERPL-SCNC: 24.5 MMOL/L (ref 22–29)
CREAT SERPL-MCNC: 0.69 MG/DL (ref 0.57–1)
DEPRECATED RDW RBC AUTO: 47 FL (ref 37–54)
EGFRCR SERPLBLD CKD-EPI 2021: 109.9 ML/MIN/1.73
EOSINOPHIL # BLD AUTO: 0.01 10*3/MM3 (ref 0–0.4)
EOSINOPHIL NFR BLD AUTO: 0.1 % (ref 0.3–6.2)
ERYTHROCYTE [DISTWIDTH] IN BLOOD BY AUTOMATED COUNT: 13.5 % (ref 12.3–15.4)
GLUCOSE SERPL-MCNC: 139 MG/DL (ref 65–99)
HCT VFR BLD AUTO: 24.8 % (ref 34–46.6)
HGB BLD-MCNC: 8.1 G/DL (ref 12–15.9)
IMM GRANULOCYTES # BLD AUTO: 0.04 10*3/MM3 (ref 0–0.05)
IMM GRANULOCYTES NFR BLD AUTO: 0.4 % (ref 0–0.5)
LYMPHOCYTES # BLD AUTO: 1.47 10*3/MM3 (ref 0.7–3.1)
LYMPHOCYTES NFR BLD AUTO: 15.7 % (ref 19.6–45.3)
MCH RBC QN AUTO: 31.3 PG (ref 26.6–33)
MCHC RBC AUTO-ENTMCNC: 32.7 G/DL (ref 31.5–35.7)
MCV RBC AUTO: 95.8 FL (ref 79–97)
MONOCYTES # BLD AUTO: 0.78 10*3/MM3 (ref 0.1–0.9)
MONOCYTES NFR BLD AUTO: 8.3 % (ref 5–12)
NEUTROPHILS NFR BLD AUTO: 7.05 10*3/MM3 (ref 1.7–7)
NEUTROPHILS NFR BLD AUTO: 75.4 % (ref 42.7–76)
NRBC BLD AUTO-RTO: 0 /100 WBC (ref 0–0.2)
PLATELET # BLD AUTO: 202 10*3/MM3 (ref 140–450)
PMV BLD AUTO: 10.6 FL (ref 6–12)
POTASSIUM SERPL-SCNC: 4.4 MMOL/L (ref 3.5–5.2)
RBC # BLD AUTO: 2.59 10*6/MM3 (ref 3.77–5.28)
SODIUM SERPL-SCNC: 138 MMOL/L (ref 136–145)
WBC NRBC COR # BLD AUTO: 9.36 10*3/MM3 (ref 3.4–10.8)

## 2024-10-17 PROCEDURE — 94799 UNLISTED PULMONARY SVC/PX: CPT

## 2024-10-17 PROCEDURE — 94761 N-INVAS EAR/PLS OXIMETRY MLT: CPT

## 2024-10-17 PROCEDURE — 85025 COMPLETE CBC W/AUTO DIFF WBC: CPT | Performed by: SURGERY

## 2024-10-17 PROCEDURE — 80048 BASIC METABOLIC PNL TOTAL CA: CPT | Performed by: SURGERY

## 2024-10-17 RX ORDER — OXYCODONE AND ACETAMINOPHEN 5; 325 MG/1; MG/1
1 TABLET ORAL EVERY 6 HOURS PRN
Qty: 28 TABLET | Refills: 0 | Status: SHIPPED | OUTPATIENT
Start: 2024-10-17

## 2024-10-17 RX ADMIN — GABAPENTIN 300 MG: 300 CAPSULE ORAL at 16:12

## 2024-10-17 RX ADMIN — POLYETHYLENE GLYCOL 3350 17 G: 17 POWDER, FOR SOLUTION ORAL at 09:33

## 2024-10-17 RX ADMIN — OXYCODONE HYDROCHLORIDE AND ACETAMINOPHEN 1 TABLET: 5; 325 TABLET ORAL at 16:12

## 2024-10-17 RX ADMIN — CEPHALEXIN 500 MG: 500 CAPSULE ORAL at 09:32

## 2024-10-17 RX ADMIN — SENNOSIDES AND DOCUSATE SODIUM 2 TABLET: 50; 8.6 TABLET ORAL at 09:33

## 2024-10-17 RX ADMIN — CEPHALEXIN 500 MG: 500 CAPSULE ORAL at 12:43

## 2024-10-17 RX ADMIN — DEXTROSE AND SODIUM CHLORIDE 75 ML/HR: 5; 450 INJECTION, SOLUTION INTRAVENOUS at 05:41

## 2024-10-17 RX ADMIN — GABAPENTIN 300 MG: 300 CAPSULE ORAL at 09:32

## 2024-10-17 RX ADMIN — OXYCODONE HYDROCHLORIDE AND ACETAMINOPHEN 1 TABLET: 5; 325 TABLET ORAL at 09:33

## 2024-10-17 NOTE — DISCHARGE SUMMARY
Physician Discharge Summary  Patient Identification:  Name: Angelique Cordon  Age: 44 y.o.  Sex: female  :  1980  MRN: [id number]    Admit date: 10/16/2024    Discharge date and time: 10/17/2024    Admitting Physician: Raffaele Willams MD     Discharge Physician: Raffaele Willams MD    Admission Diagnoses: Lipedema [R60.9]  Localized edema [R60.0]  Pain in both lower extremities [M79.604, M79.605]  Difficulty walking [R26.2]    Discharge Diagnoses: Lipedema [R60.9]  Localized edema [R60.0]  Pain in both lower extremities [M79.604, M79.605]  Difficulty walking [R26.2]    Discharged Condition: good    Admission HPI:    Procedures:  Procedure(s) (LRB):  LIPOSUCTION BACK, hip liposuction with skin excision and closure (Bilateral)    Hospital Course:   The pt was brought to the OR on 10/16/2024 for the above procedure. Patient  tolerated the procedure well, see dictated operative note for detailed summary. At the time of discharge patient was tolerating a regular diet and having bowel movements. Also, at the time of discharge  pain was controlled on oral medication and patient was ambulating without assistance.     Consults:   None    Significant Diagnostic Studies: labs and radiology    Discharge Exam:  GEN: no acute distress, resting quietly   HEENT: NCAT, EOMI, MMM   HEART: normal rate, regular rhythm   LUNGS: respirations non-labored   ABD: soft, nondistended, nontender   EXT: moves all extremities, no edema    Disposition:  Home    Patient Instructions:   [unfilled]   Follow-up Information       Hannah Parrish, APRN .    Specialties: Nurse Practitioner, Family Medicine  Contact information:  [address] [id number] [phone]                                 Lipedema    Localized edema    Pain in both lower extremities    Difficulty walking      Signed:  Raffaele Ross MD, PhD  Plastic and reconstructive surgery   10/17/2024

## 2024-10-17 NOTE — PLAN OF CARE
Goal Outcome Evaluation:  Plan of Care Reviewed With: patient        Progress: improving  Outcome Evaluation: Up to bedside commode, continuing to tolerate regular diet, pass gas, c/o 4/10 left hip pain relieved with percocet x1, preparing to d/c home with family for self care.

## 2024-10-17 NOTE — DISCHARGE INSTRUCTIONS
Ok for regular diet  Do not drive for next 2 weeks  Ok to shower   Strip drains q 8 hours and record drain output daily. Wash hands or wear gloves when manipulating drains.  Do not use your under garments to secure your drains.   Do not exercise for the next 6 weeks.  Sleep in an upright position  Do not fly for 2 months  Keep binder all the time    Take post-operative medications as directed on the bottle.     If you experience any issues or concerns, please contact the office at [phone]. If after hours a call service will notify the on-call provider.

## 2024-10-17 NOTE — PROGRESS NOTES
PLASTIC SURGERY PROGRESS NOTE    Patient Identification:  Name: Angelique Cordon    Age: 44 y.o.    Sex: female   :  1980  MRN: [id number]               Subjective:  No acute events.    Objective:    Continuous Infusions:     Scheduled Meds:cephalexin, 500 mg, Oral, 4x Daily  gabapentin, 300 mg, Oral, TID  melatonin, 5 mg, Oral, Nightly  polyethylene glycol, 17 g, Oral, Daily  senna-docusate sodium, 2 tablet, Oral, BID      PRN Meds:  acetaminophen **OR** acetaminophen    HYDROmorphone **AND** naloxone    ketorolac    ondansetron ODT **OR** ondansetron    oxyCODONE-acetaminophen    Vital signs in last 24 hours:  Vitals:    10/16/24 2043 10/16/24 2226 10/17/24 0254 10/17/24 0743   BP: 98/60 92/52 (!) 88/52 93/59   BP Location: Right arm Right arm Right arm Right arm   Patient Position: Lying Lying Lying Lying   Pulse: 96 82 80 85   Resp:    Temp: 99 °F (37.2 °C) 99 °F (37.2 °C) 98.4 °F (36.9 °C) 98.6 °F (37 °C)   TempSrc: Oral Oral Oral Oral   SpO2: 94% 94% 95% 98%   Weight:       Height:           Intake/Output:I/O last 3 completed shifts:  In: 2950 [P.O.:600; I.V.:2250; IV Piggyback:100]  Out: 1330 [Urine:750; Drains:180; Blood:400]    Exam:  GEN: no acute distress, resting quietly   HEENT: NCAT, EOMI, MMM   HEART: normal rate, regular rhythm   LUNGS: respirations non-labored   ABD: soft, nondistended, nontender   EXT: moves all extremities, no edema      Recent Results (from the past 24 hours)   Basic Metabolic Panel    Collection Time: 10/17/24  4:37 AM    Specimen: Arm, Left; Blood   Result Value Ref Range    Glucose 139 (H) 65 - 99 mg/dL    BUN 9 6 - 20 mg/dL    Creatinine 0.69 0.57 - 1.00 mg/dL    Sodium 138 136 - 145 mmol/L    Potassium 4.4 3.5 - 5.2 mmol/L    Chloride 108 (H) 98 - 107 mmol/L    CO2 24.5 22.0 - 29.0 mmol/L    Calcium 8.1 (L) 8.6 - 10.5 mg/dL    BUN/Creatinine Ratio 13.0 7.0 - 25.0    Anion Gap 5.5 5.0 - 15.0 mmol/L    eGFR 109.9 >60.0 mL/min/1.73   CBC Auto Differential     Collection Time: 10/17/24  4:37 AM    Specimen: Arm, Left; Blood   Result Value Ref Range    WBC 9.36 3.40 - 10.80 10*3/mm3    RBC 2.59 (L) 3.77 - 5.28 10*6/mm3    Hemoglobin 8.1 (L) 12.0 - 15.9 g/dL    Hematocrit 24.8 (L) 34.0 - 46.6 %    MCV 95.8 79.0 - 97.0 fL    MCH 31.3 26.6 - 33.0 pg    MCHC 32.7 31.5 - 35.7 g/dL    RDW 13.5 12.3 - 15.4 %    RDW-SD 47.0 37.0 - 54.0 fl    MPV 10.6 6.0 - 12.0 fL    Platelets 202 140 - 450 10*3/mm3    Neutrophil % 75.4 42.7 - 76.0 %    Lymphocyte % 15.7 (L) 19.6 - 45.3 %    Monocyte % 8.3 5.0 - 12.0 %    Eosinophil % 0.1 (L) 0.3 - 6.2 %    Basophil % 0.1 0.0 - 1.5 %    Immature Grans % 0.4 0.0 - 0.5 %    Neutrophils, Absolute 7.05 (H) 1.70 - 7.00 10*3/mm3    Lymphocytes, Absolute 1.47 0.70 - 3.10 10*3/mm3    Monocytes, Absolute 0.78 0.10 - 0.90 10*3/mm3    Eosinophils, Absolute 0.01 0.00 - 0.40 10*3/mm3    Basophils, Absolute 0.01 0.00 - 0.20 10*3/mm3    Immature Grans, Absolute 0.04 0.00 - 0.05 10*3/mm3    nRBC 0.0 0.0 - 0.2 /100 WBC         Assessment:    Lipedema    Localized edema    Pain in both lower extremities    Difficulty walking      1 Day Post-Op Procedure(s) (LRB):  LIPOSUCTION BACK, hip liposuction with skin excision and closure (Bilateral)    Plan:  Doing well home today   Raffaele Willams MD    10/17/2024

## 2024-10-24 ENCOUNTER — OFFICE VISIT (OUTPATIENT)
Dept: PLASTIC SURGERY | Facility: CLINIC | Age: 44
End: 2024-10-24
Payer: OTHER GOVERNMENT

## 2024-10-24 VITALS
WEIGHT: 140.2 LBS | OXYGEN SATURATION: 96 % | DIASTOLIC BLOOD PRESSURE: 70 MMHG | BODY MASS INDEX: 27.52 KG/M2 | SYSTOLIC BLOOD PRESSURE: 110 MMHG | HEART RATE: 110 BPM | HEIGHT: 60 IN

## 2024-10-24 DIAGNOSIS — R60.9 LIPEDEMA: Primary | ICD-10-CM

## 2024-10-24 PROCEDURE — 99024 POSTOP FOLLOW-UP VISIT: CPT | Performed by: SURGERY

## 2024-11-04 NOTE — PROGRESS NOTES
"Chief Complaint  Post-op Follow-up    Subjective          History of Present Illness  Angelique Cordon is a 44 y.o. female who presents to [facility] for Postoperative Follow-Up of LIPOSUCTION BACK, hip liposuction with skin excision and closure, possible incisional vacuum dressing 10/16/24.    Drains intact  Right 10, 15, 20  Left  5, 20, 5  Patient concerned about areas of warmth and redness on left upper outer thigh/hip area.  There is also an area of swelling.  Right leg has a few pockets of scattered swelling    History of Present Illness  The patient is a 44-year-old female who presents for a 3-week postoperative visit after liposuction, skin excision, and lipedema treatment. She is accompanied by an adult male.    She reports feeling a bump but does not feel any fluid movement. She has been wearing a white garment continuously. She mentions swelling in her left leg and is concerned about whether it will return to its normal size.    She is considering resuming Wegovy once she has fully recovered from her current condition and her arm appointment. She is not experiencing any fevers or chills. She has a cold and some congestion. She has previously taken antibiotics without any adverse reactions.       Allergies: Patient has no known allergies.  Allergies Reconciled.    Review of Systems   All review of system has been reviewed and it  is negative except the ones note above.     Objective     /75 (BP Location: Left arm, Patient Position: Sitting, Cuff Size: Adult)   Pulse 82   Ht 152.4 cm (60\")   Wt 62.6 kg (138 lb)   SpO2 99%   BMI 26.95 kg/m²     Body mass index is 26.95 kg/m².    Physical Exam  General Inspection:     Physical Exam  Drains in the integumentary system are still intact. Incisions are well approximated. Little more erythema on the Left incision line with overlying eschar. Other incisions are well approximated without erythema or " dehiscence.      Result Review :                Assessment and Plan      Diagnoses and all orders for this visit:    1. Postoperative follow-up (Primary)    Other orders  -     sulfamethoxazole-trimethoprim (Bactrim DS) 800-160 MG per tablet; Take 1 tablet by mouth 2 (Two) Times a Day.  Dispense: 20 tablet; Refill: 0          Plan:     Assessment & Plan  1. Postoperative visit.  The drains were removed today due to low output. Physical exam shows incisions are well approximated, with some redness and warmth on the right side. The left leg is swollen a bit more than the Right. A bedside ultrasound was performed to evaluate for fluid, revealing small pockets but nothing significant to aspirate. Antibiotic ointment and gauze were applied over the drain sites. The patient is advised to continue wearing the binder until further notice. A message will be sent to Dr. Willams regarding the binder. She is advised to be cautious with sun exposure and monitor for any signs of allergic reactions while on the antibiotic. Sent in Bactrim DS BID for 10 days as preventive measure for the redness on her Left hip area.   Dr. Willams was consulted about the abdominal binder and she said it was OK to discontinue and transition over to snug-fitting pants. A message was sent to the patient after her visit.    2. Redness and warmth on the right side.  The patient reports redness and warmth on the right side. There are no fevers or chills. She is advised to monitor for any signs of infection and to contact the office if symptoms worsen.    3. Left leg swelling.  The patient reports swelling in the left leg. She is advised that swelling may persist for a while postoperatively and to monitor the condition.    4. Cold symptoms.  The patient reports feeling congested and having a cold. She is advised to rest and monitor her symptoms.    5. Medication Management.  The patient inquires about resuming Wegovy after healing from the current  surgery. She said previously she had stayed off for 1 full month before resuming. I advised to continue this practice unless Dr. Willams states otherwise.    Follow-up  Return in 2 weeks for recheck.     And 3 months with Dr. Willams for brachioplasty consultation.           Follow Up     Return in about 2 weeks (around 11/21/2024) for postop, Fluid check.    Patient was given instructions and counseling regarding her condition. Please see specific information pulled into the AVS if appropriate.     Adina Chaudhari PA-C  11/07/2024    Patient or patient representative verbalized consent for the use of Ambient Listening during the visit with  Adina Chaudhari PA-C for chart documentation. 11/7/2024  13:55 EST

## 2024-11-07 ENCOUNTER — OFFICE VISIT (OUTPATIENT)
Dept: PLASTIC SURGERY | Facility: CLINIC | Age: 44
End: 2024-11-07
Payer: OTHER GOVERNMENT

## 2024-11-07 VITALS
DIASTOLIC BLOOD PRESSURE: 75 MMHG | HEIGHT: 60 IN | WEIGHT: 138 LBS | SYSTOLIC BLOOD PRESSURE: 110 MMHG | OXYGEN SATURATION: 99 % | HEART RATE: 82 BPM | BODY MASS INDEX: 27.09 KG/M2

## 2024-11-07 DIAGNOSIS — Z09 POSTOPERATIVE FOLLOW-UP: Primary | ICD-10-CM

## 2024-11-07 RX ORDER — SULFAMETHOXAZOLE AND TRIMETHOPRIM 800; 160 MG/1; MG/1
1 TABLET ORAL 2 TIMES DAILY
Qty: 20 TABLET | Refills: 0 | Status: SHIPPED | OUTPATIENT
Start: 2024-11-07

## 2024-11-11 ENCOUNTER — OFFICE VISIT (OUTPATIENT)
Dept: FAMILY MEDICINE CLINIC | Facility: CLINIC | Age: 44
End: 2024-11-11
Payer: OTHER GOVERNMENT

## 2024-11-11 VITALS
SYSTOLIC BLOOD PRESSURE: 120 MMHG | DIASTOLIC BLOOD PRESSURE: 70 MMHG | HEART RATE: 79 BPM | BODY MASS INDEX: 27.68 KG/M2 | HEIGHT: 60 IN | OXYGEN SATURATION: 100 % | WEIGHT: 141 LBS

## 2024-11-11 DIAGNOSIS — E83.51 HYPOCALCEMIA: Primary | ICD-10-CM

## 2024-11-11 DIAGNOSIS — F41.9 ANXIETY: ICD-10-CM

## 2024-11-11 DIAGNOSIS — D64.9 ANEMIA, UNSPECIFIED TYPE: ICD-10-CM

## 2024-11-11 NOTE — PROGRESS NOTES
Chief Complaint  Follow-up    SUBJECTIVE  Angelique Cordon presents to [facility] to follow up from recent Hospital admission.  Patient had another surgery for lipedema, states she is doing very well, did not require blood transfusion this time, was a bit anemic at discharge  .    History of Present Illness  Past Medical History:   Diagnosis Date    Abnormal bone density screening     Anxiety     History of transfusion     X2 following previous leg surgeries    Lipedema     Lipedema of lower extremity     LEGS AND ARMS    Localized edema     Pap smear for cervical cancer screening 2018    PONV (postoperative nausea and vomiting)     Skin irritation     noted under nasal area, currently using  ointment prescribed by pcp    Varicose vein of leg     Visit for screening mammogram       Family History   Problem Relation Age of Onset    Diabetes Mother     Sleep apnea Mother     COPD Mother     Hypertension Mother     Colon cancer Father     Malig Hyperthermia Neg Hx       Past Surgical History:   Procedure Laterality Date    EXCISION LESION Left 4/1/2022    Procedure: EXCISION CYST LEFT AXILLARY;  Surgeon: Rosalie Talley MD;  Location: [facility];  Service: General;  Laterality: Left;    FOOT SURGERY Left 2003/2009 HAD 3 DIFFERENT SURGERIES    L FOOT/TENDON RELEASE PLANTAR FASIA    LIPOSUCTION Right 10/24/2023    Procedure: LIPOSUCTION, Right Lower Extremity Circumferential Liposuction with Skin Resection and negative pressure dressing;  Surgeon: Raffaele Willams MD;  Location: [facility];  Service: Plastics;  Laterality: Right;    LIPOSUCTION Left 4/19/2024    Procedure: LIPOSUCTION, Left Lower Extremity Circumferential Liposuction with Skin Resection and negative pressure dressing;  Surgeon: Raffaele Willams MD;  Location: [facility];  Service: Plastics;  Laterality: Left;    LIPOSUCTION Bilateral 10/16/2024    Procedure: LIPOSUCTION BACK, hip liposuction with  "skin excision and closure;  Surgeon: Raffaele Willams MD;  Location: [place] [facility] OR;  Service: Plastics;  Laterality: Bilateral;        Current Outpatient Medications:     ibuprofen (ADVIL,MOTRIN) 600 MG tablet, Take 1 tablet by mouth Every 8 (Eight) Hours As Needed for Mild Pain., Disp: 30 tablet, Rfl: 0    melatonin 5 MG tablet tablet, Take 1 tablet by mouth Every Night., Disp: , Rfl:     miconazole (Micatin) 2 % cream, Apply 1 Application topically to the appropriate area as directed 2 (Two) Times a Day., Disp: 35 g, Rfl: 0    sulfamethoxazole-trimethoprim (Bactrim DS) 800-160 MG per tablet, Take 1 tablet by mouth 2 (Two) Times a Day., Disp: 20 tablet, Rfl: 0    Vortioxetine HBr (Trintellix) 20 MG tablet, Take 1 tablet by mouth Daily., Disp: 90 tablet, Rfl: 1    acetaminophen (TYLENOL) 500 MG tablet, Take 2 tablets by mouth Every 6 (Six) Hours As Needed for Moderate Pain for up to 18 doses., Disp: 18 tablet, Rfl: 0    Chlorhexidine Gluconate 4 % solution, Apply 1 Application topically to the appropriate area as directed Daily. Shower daily for 7 days prior to surgery, Disp: 473 mL, Rfl: 0    OBJECTIVE  Vital Signs:   /70   Pulse 79   Ht 152.4 cm (60\")   Wt 64 kg (141 lb)   SpO2 100%   BMI 27.54 kg/m²    Estimated body mass index is 27.54 kg/m² as calculated from the following:    Height as of this encounter: 152.4 cm (60\").    Weight as of this encounter: 64 kg (141 lb).     Wt Readings from Last 3 Encounters:   11/11/24 64 kg (141 lb)   11/07/24 62.6 kg (138 lb)   10/24/24 63.6 kg (140 lb 3.2 oz)     BP Readings from Last 3 Encounters:   11/11/24 120/70   11/07/24 110/75   10/24/24 110/70       Physical Exam  Vitals reviewed.   Constitutional:       Appearance: Normal appearance. She is well-developed.   HENT:      Head: Normocephalic and atraumatic.      Right Ear: External ear normal.      Left Ear: External ear normal.   Eyes:      Conjunctiva/sclera: Conjunctivae normal.      Pupils: Pupils " are equal, round, and reactive to light.   Cardiovascular:      Rate and Rhythm: Normal rate and regular rhythm.      Heart sounds: No murmur heard.     No friction rub. No gallop.   Pulmonary:      Effort: Pulmonary effort is normal.      Breath sounds: Normal breath sounds. No wheezing or rhonchi.   Skin:     General: Skin is warm and dry.   Neurological:      Mental Status: She is alert and oriented to person, place, and time.      Cranial Nerves: No cranial nerve deficit.   Psychiatric:         Mood and Affect: Mood and affect normal.         Behavior: Behavior normal.         Thought Content: Thought content normal.         Judgment: Judgment normal.          Result Review    CMP          4/21/2024    04:25 7/9/2024    07:58 10/17/2024    04:37   CMP   Glucose 110  87  139    BUN 5  16  9    Creatinine 0.57  0.72  0.69    EGFR 115.1  105.9  109.9    Sodium 138  139  138    Potassium 3.7  4.2  4.4    Chloride 107  103  108    Calcium 7.6  9.6  8.1    Total Protein  7.2     Albumin  4.4     Globulin  2.8     Total Bilirubin  0.3     Alkaline Phosphatase  42     AST (SGOT)  15     ALT (SGPT)  18     Albumin/Globulin Ratio  1.6     BUN/Creatinine Ratio 8.8  22.2  13.0    Anion Gap 6.0  11.0  5.5      CBC          4/23/2024    03:50 7/9/2024    07:58 10/17/2024    04:37   CBC   WBC 6.87  4.40  9.36    RBC 2.89  4.76  2.59    Hemoglobin 8.9  14.3  8.1    Hematocrit 27.2  44.0  24.8    MCV 94.1  92.4  95.8    MCH 30.8  30.0  31.3    MCHC 32.7  32.5  32.7    RDW 13.2  12.7  13.5    Platelets 174  191  202        Mammo Diagnostic Digital Tomosynthesis Left With CAD    Result Date: 8/6/2024  OVERALL IMPRESSION: Benign left mammogram and focused left breast ultrasound.  BI-RADS ASSESSMENT: BI-RADS 2. Benign findings.  The breast density is heterogenously dense, which may obscure small masses.  Note:  It has been reported that there is approximately a 15% false negative in mammography.  Therefore, management of a palpable  abnormality should not be deferred because of a negative mammogram.        Electronically Signed By-YOLI JIN MD On:8/6/2024 10:39 AM      US Breast Left Limited    Result Date: 8/6/2024  OVERALL IMPRESSION: Benign left mammogram and focused left breast ultrasound.  BI-RADS ASSESSMENT: BI-RADS 2. Benign findings.  The breast density is heterogenously dense, which may obscure small masses.  Note:  It has been reported that there is approximately a 15% false negative in mammography.  Therefore, management of a palpable abnormality should not be deferred because of a negative mammogram.        Electronically Signed By-YOLI JIN MD On:8/6/2024 10:39 AM      Mammo Screening Digital Tomosynthesis Bilateral With CAD    Result Date: 7/26/2024   1. Left breast: Need additional imaging. Recommend a left diagnostic mammogram and left breast ultrasound.  2. Right breast: Benign mammogram.  BI-RADS ASSESSMENT: BI-RADS 0. Incomplete.  Need Additional Imaging Evaluation and/or Prior Mammograms for Comparison.   The patient's information is entered into a computerized reminder system with a targeted due date for the next mammogram.  Note:  It has been reported that there is approximately a 15% false negative rate in mammography.  Therefore, management of a palpable abnormality should not be deferred because of a negative mammogram.     Electronically Signed By-YOLI JIN MD On:7/26/2024 10:12 AM         The above data has been reviewed by ANDREI Olivera 11/11/2024 08:09 EST.          Patient Care Team:  Hannah Parrish APRN as PCP - General (Nurse Practitioner)  Rosalie Talley MD as Consulting Physician (General Surgery)  Raffaele Willams MD as Consulting Physician (Plastic Surgery)            ASSESSMENT & PLAN    Diagnoses and all orders for this visit:    1. Hypocalcemia (Primary)  -     PTH, Intact; Future  -     Vitamin D 25 hydroxy; Future    2. Anemia, unspecified type  -     CBC w AUTO  Differential; Future    3. Anxiety  Overview:  Stable and well-controlled with Trintellix, continue current medication    Orders:  -     Vortioxetine HBr (Trintellix) 20 MG tablet; Take 1 tablet by mouth Daily.  Dispense: 90 tablet; Refill: 1         Tobacco Use: Low Risk  (11/13/2024)    Patient History     Smoking Tobacco Use: Never     Smokeless Tobacco Use: Never     Passive Exposure: Never       Follow Up     Return if symptoms worsen or fail to improve.        Patient was given instructions and counseling regarding her condition or for health maintenance advice. Please see specific information pulled into the AVS if appropriate.   I have reviewed information obtained and documented by others and I have confirmed the accuracy of this documented note.    ANDREI Olivera

## 2024-11-13 RX ORDER — VORTIOXETINE 20 MG/1
20 TABLET, FILM COATED ORAL DAILY
Qty: 90 TABLET | Refills: 1 | Status: SHIPPED | OUTPATIENT
Start: 2024-11-13

## 2024-11-15 RX ORDER — IBUPROFEN 800 MG/1
800 TABLET, FILM COATED ORAL EVERY 8 HOURS PRN
Qty: 30 TABLET | Refills: 0 | Status: SHIPPED | OUTPATIENT
Start: 2024-11-15

## 2024-11-15 NOTE — TELEPHONE ENCOUNTER
Prescription for 800 mg sent, please remind patient that she does have labs that she needs to have completed

## 2024-11-18 ENCOUNTER — LAB (OUTPATIENT)
Dept: LAB | Facility: HOSPITAL | Age: 44
End: 2024-11-18
Payer: OTHER GOVERNMENT

## 2024-11-18 DIAGNOSIS — D64.9 ANEMIA, UNSPECIFIED TYPE: ICD-10-CM

## 2024-11-18 DIAGNOSIS — E83.51 HYPOCALCEMIA: ICD-10-CM

## 2024-11-18 LAB
25(OH)D3 SERPL-MCNC: 22 NG/ML (ref 30–100)
BASOPHILS # BLD AUTO: 0.04 10*3/MM3 (ref 0–0.2)
BASOPHILS NFR BLD AUTO: 0.7 % (ref 0–1.5)
DEPRECATED RDW RBC AUTO: 45 FL (ref 37–54)
EOSINOPHIL # BLD AUTO: 0.26 10*3/MM3 (ref 0–0.4)
EOSINOPHIL NFR BLD AUTO: 4.5 % (ref 0.3–6.2)
ERYTHROCYTE [DISTWIDTH] IN BLOOD BY AUTOMATED COUNT: 13 % (ref 12.3–15.4)
HCT VFR BLD AUTO: 34.7 % (ref 34–46.6)
HGB BLD-MCNC: 11.4 G/DL (ref 12–15.9)
IMM GRANULOCYTES # BLD AUTO: 0.02 10*3/MM3 (ref 0–0.05)
IMM GRANULOCYTES NFR BLD AUTO: 0.3 % (ref 0–0.5)
LYMPHOCYTES # BLD AUTO: 1.76 10*3/MM3 (ref 0.7–3.1)
LYMPHOCYTES NFR BLD AUTO: 30.3 % (ref 19.6–45.3)
MCH RBC QN AUTO: 31.1 PG (ref 26.6–33)
MCHC RBC AUTO-ENTMCNC: 32.9 G/DL (ref 31.5–35.7)
MCV RBC AUTO: 94.8 FL (ref 79–97)
MONOCYTES # BLD AUTO: 0.28 10*3/MM3 (ref 0.1–0.9)
MONOCYTES NFR BLD AUTO: 4.8 % (ref 5–12)
NEUTROPHILS NFR BLD AUTO: 3.45 10*3/MM3 (ref 1.7–7)
NEUTROPHILS NFR BLD AUTO: 59.4 % (ref 42.7–76)
NRBC BLD AUTO-RTO: 0 /100 WBC (ref 0–0.2)
PLATELET # BLD AUTO: 320 10*3/MM3 (ref 140–450)
PMV BLD AUTO: 12.6 FL (ref 6–12)
PTH-INTACT SERPL-MCNC: 24.8 PG/ML (ref 15–65)
RBC # BLD AUTO: 3.66 10*6/MM3 (ref 3.77–5.28)
WBC NRBC COR # BLD AUTO: 5.81 10*3/MM3 (ref 3.4–10.8)

## 2024-11-18 PROCEDURE — 83540 ASSAY OF IRON: CPT

## 2024-11-18 PROCEDURE — 84466 ASSAY OF TRANSFERRIN: CPT

## 2024-11-18 PROCEDURE — 36415 COLL VENOUS BLD VENIPUNCTURE: CPT

## 2024-11-18 PROCEDURE — 82607 VITAMIN B-12: CPT

## 2024-11-18 PROCEDURE — 82746 ASSAY OF FOLIC ACID SERUM: CPT

## 2024-11-18 PROCEDURE — 85025 COMPLETE CBC W/AUTO DIFF WBC: CPT

## 2024-11-18 PROCEDURE — 83970 ASSAY OF PARATHORMONE: CPT

## 2024-11-18 PROCEDURE — 82728 ASSAY OF FERRITIN: CPT

## 2024-11-18 PROCEDURE — 82306 VITAMIN D 25 HYDROXY: CPT

## 2024-11-19 DIAGNOSIS — D64.9 ANEMIA, UNSPECIFIED TYPE: Primary | ICD-10-CM

## 2024-11-19 DIAGNOSIS — E83.51 HYPOCALCEMIA: ICD-10-CM

## 2024-11-19 DIAGNOSIS — E55.9 VITAMIN D DEFICIENCY: ICD-10-CM

## 2024-11-19 LAB
FERRITIN SERPL-MCNC: 159 NG/ML (ref 13–150)
FOLATE SERPL-MCNC: 4.16 NG/ML (ref 4.78–24.2)
IRON 24H UR-MRATE: 53 MCG/DL (ref 37–145)
IRON SATN MFR SERPL: 14 % (ref 20–50)
TIBC SERPL-MCNC: 386 MCG/DL (ref 298–536)
TRANSFERRIN SERPL-MCNC: 259 MG/DL (ref 200–360)
VIT B12 BLD-MCNC: 654 PG/ML (ref 211–946)

## 2024-11-19 RX ORDER — ERGOCALCIFEROL 1.25 MG/1
50000 CAPSULE, LIQUID FILLED ORAL WEEKLY
Qty: 13 CAPSULE | Refills: 1 | Status: SHIPPED | OUTPATIENT
Start: 2024-11-19

## 2024-11-19 NOTE — PROGRESS NOTES
"Chief Complaint  Post-op Follow-up (5w post op)    Subjective          History of Present Illness  Angelique Cordon is a 44 y.o. female who presents to [facility] for Postoperative Follow-Up of LIPOSUCTION BACK, hip liposuction with skin excision and closure 10/16/24.    Pt presents today for 5w post op. Doing well.    Happy with early results.        History of Present Illness  The patient is a 44-year-old female who presents for a follow-up from lipedema surgery.    She is currently 5 weeks post-operation and is recovering well.     She has expressed interest in resuming her Wegovy and anti-inflammatory diet. She has been adhering to an anti-inflammatory diet, which she believes has been beneficial in managing her lipedema. This diet was also followed during her leg and hip surgeries. She typically discontinues her medications a month prior to surgery. However, she only stopped her medications 2 weeks before her hip surgery, which she believes led to complications.        Allergies: Patient has no known allergies.  Allergies Reconciled.    Review of Systems   All review of system has been reviewed and it  is negative except the ones note above.     Objective     /85 (BP Location: Left arm, Patient Position: Sitting, Cuff Size: Adult)   Pulse 71   Ht 152.4 cm (60\")   Wt 64.4 kg (142 lb)   SpO2 97%   BMI 27.73 kg/m²     Body mass index is 27.73 kg/m².            Physical Exam  Drain sites on the integumentary system are healed. Incisions show a few small areas of dehiscence on the left hip. Right hip has one small area of dehiscence, otherwise remaining incisions are all well healed, clean and intact. No clinical signs of fluid.       General Inspection: Incision healing well, no swelling, no erythema, no drainage.    Result Review :                Assessment and Plan      Diagnoses and all orders for this visit:    1. Postoperative follow-up " (Primary)    2. Lipedema        Plan:     Assessment & Plan  1. Postoperative follow-up.  Bedside u/s was used to evaluate for fluid. No fluid was aspirated. The remaining sutures were trimmed off today. She is advised to continue with regular postoperative care and apply petrolatum ointment on the affected areas of dehiscence. She can resume her anti-inflammatory diet as long as she ensures adequate protein intake. She is advised to avoid strenuous activities and listen to her body, avoiding any actions that cause pain.     Follow-up  Return in 2 months for follow up.          Follow Up     Return in about 2 months (around 1/22/2025) for With Dr. Willams for 3 month post-op and brachioplasty consult .    Patient was given instructions and counseling regarding her condition. Please see specific information pulled into the AVS if appropriate.     Adina Chaudhari PA-C  11/22/2024      Patient or patient representative verbalized consent for the use of Ambient Listening during the visit with  Adina Chaudhari PA-C for chart documentation. 11/22/2024  11:10 EST

## 2024-11-22 ENCOUNTER — OFFICE VISIT (OUTPATIENT)
Dept: PLASTIC SURGERY | Facility: CLINIC | Age: 44
End: 2024-11-22
Payer: OTHER GOVERNMENT

## 2024-11-22 VITALS
DIASTOLIC BLOOD PRESSURE: 85 MMHG | OXYGEN SATURATION: 97 % | WEIGHT: 142 LBS | BODY MASS INDEX: 27.88 KG/M2 | HEIGHT: 60 IN | HEART RATE: 71 BPM | SYSTOLIC BLOOD PRESSURE: 131 MMHG

## 2024-11-22 DIAGNOSIS — Z09 POSTOPERATIVE FOLLOW-UP: Primary | ICD-10-CM

## 2024-11-22 DIAGNOSIS — R60.9 LIPEDEMA: ICD-10-CM

## 2024-11-26 ENCOUNTER — TELEMEDICINE (OUTPATIENT)
Dept: FAMILY MEDICINE CLINIC | Facility: CLINIC | Age: 44
End: 2024-11-26
Payer: OTHER GOVERNMENT

## 2024-11-26 VITALS — WEIGHT: 142 LBS | HEIGHT: 60 IN | BODY MASS INDEX: 27.88 KG/M2

## 2024-11-26 DIAGNOSIS — E66.3 OVERWEIGHT (BMI 25.0-29.9): Primary | ICD-10-CM

## 2024-11-26 DIAGNOSIS — R60.9 LIPEDEMA: ICD-10-CM

## 2024-11-26 PROCEDURE — 99213 OFFICE O/P EST LOW 20 MIN: CPT | Performed by: NURSE PRACTITIONER

## 2024-11-26 RX ORDER — SEMAGLUTIDE 0.25 MG/.5ML
0.25 INJECTION, SOLUTION SUBCUTANEOUS WEEKLY
Qty: 2 ML | Refills: 1 | Status: SHIPPED | OUTPATIENT
Start: 2024-11-26

## 2024-11-26 NOTE — PROGRESS NOTES
Mode of Visit: Video  Location of patient: -HOME-  Location of provider: +[facility]+  You have chosen to receive care through a telehealth visit.  The patient has signed the video visit consent form.  The visit included audio and video interaction. No technical issues occurred during this visit.    Chief Complaint  Obesity    SUBJECTIVE  Angelique Cordon presents to [facility] to discuss starting back on Wegovy.     Pt has been off wegovy for about the last 2 months since having the surgery for lipedema, patient wants to get started back on it as soon as possible as it helps with the lipedema symptoms,  History of Present Illness  Past Medical History:   Diagnosis Date    Abnormal bone density screening     Anxiety     History of transfusion     X2 following previous leg surgeries    Lipedema     Lipedema of lower extremity     LEGS AND ARMS    Localized edema     Pap smear for cervical cancer screening 2018    PONV (postoperative nausea and vomiting)     Skin irritation     noted under nasal area, currently using  ointment prescribed by pcp    Varicose vein of leg     Visit for screening mammogram       Family History   Problem Relation Age of Onset    Diabetes Mother     Sleep apnea Mother     COPD Mother     Hypertension Mother     Colon cancer Father     Malig Hyperthermia Neg Hx       Past Surgical History:   Procedure Laterality Date    EXCISION LESION Left 4/1/2022    Procedure: EXCISION CYST LEFT AXILLARY;  Surgeon: Rosalie Talley MD;  Location: [facility] OR [facility] [place];  Service: General;  Laterality: Left;    FOOT SURGERY Left 2003/2009 HAD 3 DIFFERENT SURGERIES    L FOOT/TENDON RELEASE PLANTAR FASIA    LIPOSUCTION Right 10/24/2023    Procedure: LIPOSUCTION, Right Lower Extremity Circumferential Liposuction with Skin Resection and negative pressure dressing;  Surgeon: Raffaele Willams MD;  Location: [facility] OR [facility];  Service: Plastics;  Laterality: Right;    LIPOSUCTION  "Left 4/19/2024    Procedure: LIPOSUCTION, Left Lower Extremity Circumferential Liposuction with Skin Resection and negative pressure dressing;  Surgeon: Raffaele Willams MD;  Location: [facility] OR [facility];  Service: Plastics;  Laterality: Left;    LIPOSUCTION Bilateral 10/16/2024    Procedure: LIPOSUCTION BACK, hip liposuction with skin excision and closure;  Surgeon: Raffaele Willams MD;  Location: [facility] OR;  Service: Plastics;  Laterality: Bilateral;        Current Outpatient Medications:     ibuprofen (ADVIL,MOTRIN) 800 MG tablet, Take 1 tablet by mouth Every 8 (Eight) Hours As Needed for Mild Pain., Disp: 30 tablet, Rfl: 0    melatonin 5 MG tablet tablet, Take 1 tablet by mouth Every Night., Disp: , Rfl:     miconazole (Micatin) 2 % cream, Apply 1 Application topically to the appropriate area as directed 2 (Two) Times a Day., Disp: 35 g, Rfl: 0    vitamin D (ERGOCALCIFEROL) 1.25 MG (89513 UT) capsule capsule, Take 1 capsule by mouth 1 (One) Time Per Week., Disp: 13 capsule, Rfl: 1    Vortioxetine HBr (Trintellix) 20 MG tablet, Take 1 tablet by mouth Daily., Disp: 90 tablet, Rfl: 1    Semaglutide-Weight Management (Wegovy) 0.25 MG/0.5ML solution auto-injector, Inject 0.5 mL under the skin into the appropriate area as directed 1 (One) Time Per Week., Disp: 2 mL, Rfl: 1    OBJECTIVE  Vital Signs:   Ht 152.4 cm (60\")   Wt 64.4 kg (142 lb)   BMI 27.73 kg/m²    Estimated body mass index is 27.73 kg/m² as calculated from the following:    Height as of this encounter: 152.4 cm (60\").    Weight as of this encounter: 64.4 kg (142 lb).     Wt Readings from Last 3 Encounters:   11/26/24 64.4 kg (142 lb)   11/22/24 64.4 kg (142 lb)   11/11/24 64 kg (141 lb)     BP Readings from Last 3 Encounters:   11/22/24 131/85   11/11/24 120/70   11/07/24 110/75       Physical Exam  Constitutional:       Appearance: Normal appearance.   HENT:      Head: Normocephalic and atraumatic.   Pulmonary:      Effort: Pulmonary " effort is normal.   Neurological:      Mental Status: She is alert and oriented to person, place, and time.   Psychiatric:         Mood and Affect: Mood normal.         Behavior: Behavior normal.         Thought Content: Thought content normal.         Judgment: Judgment normal.          Result Review    CMP          4/21/2024    04:25 7/9/2024    07:58 10/17/2024    04:37   CMP   Glucose 110  87  139    BUN 5  16  9    Creatinine 0.57  0.72  0.69    EGFR 115.1  105.9  109.9    Sodium 138  139  138    Potassium 3.7  4.2  4.4    Chloride 107  103  108    Calcium 7.6  9.6  8.1    Total Protein  7.2     Albumin  4.4     Globulin  2.8     Total Bilirubin  0.3     Alkaline Phosphatase  42     AST (SGOT)  15     ALT (SGPT)  18     Albumin/Globulin Ratio  1.6     BUN/Creatinine Ratio 8.8  22.2  13.0    Anion Gap 6.0  11.0  5.5      CBC          7/9/2024    07:58 10/17/2024    04:37 11/18/2024    15:49   CBC   WBC 4.40  9.36  5.81    RBC 4.76  2.59  3.66    Hemoglobin 14.3  8.1  11.4    Hematocrit 44.0  24.8  34.7    MCV 92.4  95.8  94.8    MCH 30.0  31.3  31.1    MCHC 32.5  32.7  32.9    RDW 12.7  13.5  13.0    Platelets 191  202  320      TSH          7/9/2024    07:58   TSH   TSH 3.440          Mammo Diagnostic Digital Tomosynthesis Left With CAD    Result Date: 8/6/2024  OVERALL IMPRESSION: Benign left mammogram and focused left breast ultrasound.  BI-RADS ASSESSMENT: BI-RADS 2. Benign findings.  The breast density is heterogenously dense, which may obscure small masses.  Note:  It has been reported that there is approximately a 15% false negative in mammography.  Therefore, management of a palpable abnormality should not be deferred because of a negative mammogram.        Electronically Signed By-YOLI JIN MD On:8/6/2024 10:39 AM      US Breast Left Limited    Result Date: 8/6/2024  OVERALL IMPRESSION: Benign left mammogram and focused left breast ultrasound.  BI-RADS ASSESSMENT: BI-RADS 2. Benign findings.  The  breast density is heterogenously dense, which may obscure small masses.  Note:  It has been reported that there is approximately a 15% false negative in mammography.  Therefore, management of a palpable abnormality should not be deferred because of a negative mammogram.        Electronically Signed By-YOLI JIN MD On:8/6/2024 10:39 AM         The above data has been reviewed by ANDREI Olivera 11/26/2024 10:18 EST.          Patient Care Team:  Hannah Parrish APRN as PCP - General (Nurse Practitioner)  Rosalie Talley MD as Consulting Physician (General Surgery)  Raffaele Willams MD as Consulting Physician (Plastic Surgery)            ASSESSMENT & PLAN    Diagnoses and all orders for this visit:    1. Overweight (BMI 25.0-29.9) (Primary)  Assessment & Plan:  Patient has been off of her Wegovy for approximately 2 months due to stopping for surgery, we will resume at starting dose, side effects administration medication discussed, patient confirmed there has been no updated personal or family history of pancreatitis, medullary thyroid cancer, or multiple endocrine neoplasia.    Orders:  -     Semaglutide-Weight Management (Wegovy) 0.25 MG/0.5ML solution auto-injector; Inject 0.5 mL under the skin into the appropriate area as directed 1 (One) Time Per Week.  Dispense: 2 mL; Refill: 1    2. Lipedema  -     Semaglutide-Weight Management (Wegovy) 0.25 MG/0.5ML solution auto-injector; Inject 0.5 mL under the skin into the appropriate area as directed 1 (One) Time Per Week.  Dispense: 2 mL; Refill: 1    Patient has been off of their Wegovy for approximately 2 months due to stopping for surgery, we will resume at starting dose, side effects administration medication discussed, patient confirmed there has been no updated personal or family history of pancreatitis, medullary thyroid cancer, or multiple endocrine neoplasia.    Tobacco Use: Low Risk  (11/26/2024)    Patient History     Smoking Tobacco  Use: Never     Smokeless Tobacco Use: Never     Passive Exposure: Never       Follow Up     Return if symptoms worsen or fail to improve.        Patient was given instructions and counseling regarding her condition or for health maintenance advice. Please see specific information pulled into the AVS if appropriate.   I have reviewed information obtained and documented by others and I have confirmed the accuracy of this documented note.    ANDREI Olivera

## 2024-11-26 NOTE — ASSESSMENT & PLAN NOTE
Patient has been off of her Wegovy for approximately 2 months due to stopping for surgery, we will resume at starting dose, side effects administration medication discussed, patient confirmed there has been no updated personal or family history of pancreatitis, medullary thyroid cancer, or multiple endocrine neoplasia.

## 2024-11-27 ENCOUNTER — HOSPITAL ENCOUNTER (OUTPATIENT)
Dept: GENERAL RADIOLOGY | Facility: HOSPITAL | Age: 44
Discharge: HOME | End: 2024-11-27
Admitting: NURSE PRACTITIONER
Payer: OTHER GOVERNMENT

## 2024-11-27 DIAGNOSIS — M54.50 LOW BACK PAIN, UNSPECIFIED BACK PAIN LATERALITY, UNSPECIFIED CHRONICITY, UNSPECIFIED WHETHER SCIATICA PRESENT: ICD-10-CM

## 2024-11-27 PROCEDURE — 72220 X-RAY EXAM SACRUM TAILBONE: CPT

## 2024-12-03 DIAGNOSIS — M53.3 TAIL BONE PAIN: Primary | ICD-10-CM

## 2024-12-03 DIAGNOSIS — M54.50 LOW BACK PAIN, UNSPECIFIED BACK PAIN LATERALITY, UNSPECIFIED CHRONICITY, UNSPECIFIED WHETHER SCIATICA PRESENT: ICD-10-CM

## 2025-01-13 RX ORDER — SEMAGLUTIDE 1 MG/.5ML
1 INJECTION, SOLUTION SUBCUTANEOUS WEEKLY
Qty: 2 ML | Refills: 1 | Status: SHIPPED | OUTPATIENT
Start: 2025-01-13

## 2025-01-22 DIAGNOSIS — T75.3XXA MOTION SICKNESS, INITIAL ENCOUNTER: Primary | ICD-10-CM

## 2025-01-22 RX ORDER — SCOLOPAMINE TRANSDERMAL SYSTEM 1 MG/1
1 PATCH, EXTENDED RELEASE TRANSDERMAL
Qty: 4 EACH | Refills: 0 | Status: SHIPPED | OUTPATIENT
Start: 2025-01-22

## 2025-01-27 NOTE — PROGRESS NOTES
"Consult            History of Present Illness  Angelique Cordon is a 44 y.o. female who presents to [facility] as a consult for lymphedema of the upper arms.she has been operated for lipedema for her legs and hips and she is satisfied with the results.         Subjective       Patient has no known allergies.  Allergies Reconciled.    Review of Systems    All review of system has been reviewed and it  is negative except the ones note above.     Objective     Pulse 90   Ht 152.4 cm (60\")   Wt 57.2 kg (126 lb)   SpO2 96%   BMI 24.61 kg/m²     Body mass index is 24.61 kg/m².           Physical Exam  There is more puffiness on the medial thigh and also on the right thigh. The left thigh appears larger than the right. The right arm measures 33.5, above the elbow on the right arm is 26.5, and the right wrist is 16. The left arm measures 34.5, above the elbow on the left arm is 29, and the left wrist is 16.5.      Result Review :       Procedures         Assessment and Plan      Assessment & Plan  1. Lymphedema.  The condition is not indicative of a recurrence of lymphedema. The observed puffiness is likely due to an excess of soft tissue. The left thigh appears larger than the right, which is a common occurrence post-surgery. A procedure involving bilateral liposuction and skin excision for the arms, as well as skin tailoring for both legs, will be proposed to the insurance company. She has been advised to wear compressive hose with closed toes extending up to the waist, with a compression level of 25 to 30 mmHg.    PROCEDURE  The patient underwent a surgical procedure involving the hips and legs.        1. Lipedema  Consent: bilateral upper extremities liposuction with skin excision. 2.5 hrs     CPT:  70364- suction assisted lipectomy- upper extremity  54990 -  Surgical excision of excess skin from the arm.               Follow Up     No follow-ups on file.    Patient " was given instructions and counseling regarding her condition. Please see specific information pulled into the AVS if appropriate.     Raffaele Willams MD  02/06/2025    Patient or patient representative verbalized consent for the use of Ambient Listening during the visit with  Raffaele Willams MD for chart documentation. 2/7/2025  08:37 EST

## 2025-02-03 ENCOUNTER — TELEPHONE (OUTPATIENT)
Dept: PLASTIC SURGERY | Facility: CLINIC | Age: 45
End: 2025-02-03
Payer: OTHER GOVERNMENT

## 2025-02-04 RX ORDER — SEMAGLUTIDE 1 MG/.5ML
1 INJECTION, SOLUTION SUBCUTANEOUS WEEKLY
Qty: 2 ML | Refills: 1 | Status: CANCELLED | OUTPATIENT
Start: 2025-02-04

## 2025-02-05 RX ORDER — SEMAGLUTIDE 1 MG/.5ML
1 INJECTION, SOLUTION SUBCUTANEOUS WEEKLY
Qty: 2 ML | Refills: 1 | Status: SHIPPED | OUTPATIENT
Start: 2025-02-05

## 2025-02-06 ENCOUNTER — CONSULT (OUTPATIENT)
Dept: PLASTIC SURGERY | Facility: CLINIC | Age: 45
End: 2025-02-06
Payer: OTHER GOVERNMENT

## 2025-02-06 VITALS — BODY MASS INDEX: 24.74 KG/M2 | WEIGHT: 126 LBS | HEIGHT: 60 IN | HEART RATE: 90 BPM | OXYGEN SATURATION: 96 %

## 2025-02-06 DIAGNOSIS — R60.9 LIPEDEMA: Primary | ICD-10-CM

## 2025-02-07 ENCOUNTER — PREP FOR SURGERY (OUTPATIENT)
Dept: OTHER | Facility: HOSPITAL | Age: 45
End: 2025-02-07
Payer: OTHER GOVERNMENT

## 2025-02-07 DIAGNOSIS — R60.9 LIPEDEMA: Primary | ICD-10-CM

## 2025-02-07 RX ORDER — TRANEXAMIC ACID 10 MG/ML
1000 INJECTION, SOLUTION INTRAVENOUS
OUTPATIENT
Start: 2025-02-07

## 2025-02-07 RX ORDER — SCOPOLAMINE 1 MG/3D
1 PATCH, EXTENDED RELEASE TRANSDERMAL CONTINUOUS
OUTPATIENT
Start: 2025-02-07 | End: 2025-02-10

## 2025-02-07 RX ORDER — ACETAMINOPHEN 500 MG
1000 TABLET ORAL ONCE
OUTPATIENT
Start: 2025-02-07 | End: 2025-02-07

## 2025-03-03 RX ORDER — SEMAGLUTIDE 1.7 MG/.75ML
1.7 INJECTION, SOLUTION SUBCUTANEOUS WEEKLY
Qty: 3 ML | Refills: 2 | Status: SHIPPED | OUTPATIENT
Start: 2025-03-03

## 2025-03-24 RX ORDER — SEMAGLUTIDE 1.7 MG/.75ML
1.7 INJECTION, SOLUTION SUBCUTANEOUS WEEKLY
Qty: 3 ML | Refills: 2 | Status: SHIPPED | OUTPATIENT
Start: 2025-03-24

## 2025-03-24 RX ORDER — IBUPROFEN 800 MG/1
800 TABLET, FILM COATED ORAL EVERY 8 HOURS PRN
Qty: 30 TABLET | Refills: 0 | Status: SHIPPED | OUTPATIENT
Start: 2025-03-24

## 2025-03-24 NOTE — TELEPHONE ENCOUNTER
Can I refill this dose again ? I will only be refilling this time and once more then I will be off due to dhara in July

## 2025-04-28 RX ORDER — SEMAGLUTIDE 1.7 MG/.75ML
1.7 INJECTION, SOLUTION SUBCUTANEOUS WEEKLY
Qty: 3 ML | Refills: 2 | Status: SHIPPED | OUTPATIENT
Start: 2025-04-28

## 2025-04-29 ENCOUNTER — TELEMEDICINE (OUTPATIENT)
Dept: FAMILY MEDICINE CLINIC | Facility: CLINIC | Age: 45
End: 2025-04-29
Payer: OTHER GOVERNMENT

## 2025-04-29 DIAGNOSIS — E66.3 OVERWEIGHT (BMI 25.0-29.9): Primary | ICD-10-CM

## 2025-04-29 DIAGNOSIS — Z80.0 FAMILY HISTORY OF COLON CANCER: ICD-10-CM

## 2025-04-29 DIAGNOSIS — R60.9 LIPEDEMA: ICD-10-CM

## 2025-04-29 DIAGNOSIS — Z12.11 COLON CANCER SCREENING: ICD-10-CM

## 2025-04-29 NOTE — ASSESSMENT & PLAN NOTE
We discussed her office shin to cont Wegovy postsurgery, patient tentatively scheduled for surgery in July, knows she has to stop the medication prior to surgery, we will follow-up afterwards

## 2025-04-29 NOTE — ASSESSMENT & PLAN NOTE
In office weight 135 pounds, patient will continue Wegovy, discussed recommend continuing maintenance dose once BMI is at goal, we will follow-up again in 3 months

## 2025-04-30 VITALS — BODY MASS INDEX: 26.5 KG/M2 | HEIGHT: 60 IN | WEIGHT: 135 LBS

## 2025-04-30 NOTE — PROGRESS NOTES
"Chief Complaint  Procedure (In office otoplasty)    Subjective              History of Present Illness  Angelique Cordon is a 45 y.o. female who presents to [facility] as a in office procedure for bilateral ear otoplasty revision.    History of Present Illness       Allergies: Patient has no known allergies.  Allergies Reconciled.    Review of Systems   All review of system has been reviewed and it  is negative except the ones note above.     Objective     /85 (BP Location: Right arm, Patient Position: Sitting, Cuff Size: Adult)   Pulse 77   Temp 97.8 °F (36.6 °C) (Temporal)   Ht 152.4 cm (60\")   Wt 59.4 kg (131 lb)   SpO2 96%   BMI 25.58 kg/m²     Body mass index is 25.58 kg/m².    Physical Exam  Physical Exam       Cardiovascular: Normal rate    Pulmonary/Chest: Effort normal    Face:     Result Review :       Bilateral ear otoplasty revision    Date/Time: 5/8/2025 9:49 AM    Performed by: Raffaele Willams MD  Authorized by: Raffaele Willams MD  Preparation: Patient was prepped and draped in the usual sterile fashion.  Local anesthesia used: yes    Anesthesia:  Local anesthesia used: yes  Local Anesthetic: lidocaine 1% with epinephrine  Anesthetic total: 10 mL  Patient tolerance: patient tolerated the procedure well with no immediate complications  Comments: bilateral ear otoplasty revision           Excision Procedure: Consent obtained. Local injected to site, Lidocaine 1% with epi.  Site prepped with Betadine  in sterile fashion. Site draped in sterile fashion.  I started on the left. dissected down through skin and subcutaneous tissue completely around the back of the anti helix. Then, the cartilage was completely incised and folded with 3 interrupted sutures of 4-0 PDS. The skin was then closed with 4-0 monocryl  . I then repeated on the right. Site cleaned with sterile normal saline. The patient tolerated the procedure well with no " immediate complications.            Assessment and Plan    Assessment & Plan       Diagnoses and all orders for this visit:    1. Congenital abnormality of ear (Primary)    Other orders  -     bilateral ear otoplasty revision        Plan:  This is a cosmetic revision at no charge           Follow Up     No follow-ups on file.    Patient was given instructions and counseling regarding her condition. Please see specific information pulled into the AVS if appropriate.     Raffaele Willams MD  05/08/2025

## 2025-05-08 ENCOUNTER — PROCEDURE VISIT (OUTPATIENT)
Dept: PLASTIC SURGERY | Facility: CLINIC | Age: 45
End: 2025-05-08

## 2025-05-08 VITALS
HEART RATE: 77 BPM | SYSTOLIC BLOOD PRESSURE: 122 MMHG | DIASTOLIC BLOOD PRESSURE: 85 MMHG | HEIGHT: 60 IN | TEMPERATURE: 97.8 F | WEIGHT: 131 LBS | OXYGEN SATURATION: 96 % | BODY MASS INDEX: 25.72 KG/M2

## 2025-05-08 DIAGNOSIS — Q17.9 CONGENITAL ABNORMALITY OF EAR: Primary | ICD-10-CM

## 2025-05-08 DIAGNOSIS — Q17.9 EAR DEFORMITY: Primary | ICD-10-CM

## 2025-05-08 RX ORDER — OXYCODONE AND ACETAMINOPHEN 5; 325 MG/1; MG/1
1 TABLET ORAL EVERY 6 HOURS PRN
Qty: 30 TABLET | Refills: 0 | Status: SHIPPED | OUTPATIENT
Start: 2025-05-08

## 2025-05-14 ENCOUNTER — TELEPHONE (OUTPATIENT)
Dept: PLASTIC SURGERY | Facility: CLINIC | Age: 45
End: 2025-05-14
Payer: OTHER GOVERNMENT

## 2025-05-14 NOTE — TELEPHONE ENCOUNTER
Pt called and said the codes that were entered for procedure was denied. I let pt know we have not received a denial letter but we will contact her when we do and go from there.

## 2025-06-06 NOTE — PROGRESS NOTES
"Follow-up (PRE OP)            History of Present Illness  Angelique Cordon is a 45 y.o. female who presents to [facility] for Pre-Operative Examination for LIPOSUCTION, bilateral upper extremities liposuction with skin excision 7/11/25.    Pt presents today for pre op.        History of Present Illness  The patient presents for evaluation of hernia.    She has been grappling with insurance coverage issues related to her breast reconstruction surgery. She was informed that the initial claim was rejected due to the submission of incorrect codes, which were reconstructive rather than medically necessary codes. She was also told that there had been no policy updates since 2004. She was advised to appeal the decision or wait for a period of 90 days before resubmitting the claim. She expressed concern about the potential worsening of her condition if the necessary procedures are not performed. She was informed that the appeal process could take between 3 to 5 days, 30 days, or 60 days, depending on the representative she spoke with.      Subjective        Patient has no known allergies.  Allergies Reconciled.    Review of Systems   All review of system has been reviewed and it  is negative except the ones note above.     Objective     /85 (BP Location: Right arm, Patient Position: Sitting, Cuff Size: Adult)   Pulse 87   Ht 152.4 cm (60\")   Wt 58.5 kg (129 lb)   SpO2 98%   BMI 25.19 kg/m²     Body mass index is 25.19 kg/m².           Physical Exam  Right arm measures 34 cm, right elbow is 25 cm, and right wrist is 17 cm. Left arm measures 34.5 cm, left elbow is 26 cm, and left wrist is 16 cm.      Result Review :                Assessment and Plan      Diagnoses and all orders for this visit:    1. Pre-operative examination (Primary)  -     Nicotine Screen, Urine - Urine, Clean Catch; Future    2. Lipedema        Plan:    Assessment & Plan  1. Hernia.  The " patient's hernia was discovered during a panniculectomy procedure. The insurance company has requested a letter of medical necessity for the appeal process. The surgery will be postponed until Monday, pending further communication from the insurance company. If no response is received by Monday, the surgery will be rescheduled.    PROCEDURE  Panniculectomy was performed.             Follow Up     No follow-ups on file.    Patient was given instructions and counseling regarding her condition. Please see specific information pulled into the AVS if appropriate.     Raffaele Willams MD  06/12/2025      Patient or patient representative verbalized consent for the use of Ambient Listening during the visit with  Raffaele Willams MD for chart documentation. 6/14/2025  12:20 EDT

## 2025-06-12 ENCOUNTER — OFFICE VISIT (OUTPATIENT)
Dept: PLASTIC SURGERY | Facility: CLINIC | Age: 45
End: 2025-06-12
Payer: OTHER GOVERNMENT

## 2025-06-12 VITALS
BODY MASS INDEX: 25.32 KG/M2 | HEIGHT: 60 IN | SYSTOLIC BLOOD PRESSURE: 126 MMHG | DIASTOLIC BLOOD PRESSURE: 85 MMHG | HEART RATE: 87 BPM | WEIGHT: 129 LBS | OXYGEN SATURATION: 98 %

## 2025-06-12 DIAGNOSIS — Z01.818 PRE-OPERATIVE EXAMINATION: Primary | ICD-10-CM

## 2025-06-12 DIAGNOSIS — R60.9 LIPEDEMA: ICD-10-CM

## 2025-06-14 PROBLEM — Q17.9 CONGENITAL ABNORMALITY OF EAR: Status: RESOLVED | Noted: 2024-05-30 | Resolved: 2025-06-14

## 2025-06-18 RX ORDER — SEMAGLUTIDE 0.25 MG/.5ML
0.25 INJECTION, SOLUTION SUBCUTANEOUS WEEKLY
Qty: 2 ML | Refills: 1 | Status: SHIPPED | OUTPATIENT
Start: 2025-06-18

## 2025-07-08 ENCOUNTER — OFFICE VISIT (OUTPATIENT)
Dept: FAMILY MEDICINE CLINIC | Facility: CLINIC | Age: 45
End: 2025-07-08
Payer: OTHER GOVERNMENT

## 2025-07-08 VITALS
BODY MASS INDEX: 25.09 KG/M2 | TEMPERATURE: 98 F | WEIGHT: 127.8 LBS | HEIGHT: 60 IN | SYSTOLIC BLOOD PRESSURE: 102 MMHG | DIASTOLIC BLOOD PRESSURE: 64 MMHG | OXYGEN SATURATION: 98 % | HEART RATE: 96 BPM

## 2025-07-08 DIAGNOSIS — R60.9 LIPEDEMA: ICD-10-CM

## 2025-07-08 DIAGNOSIS — Z00.00 ANNUAL PHYSICAL EXAM: Primary | ICD-10-CM

## 2025-07-08 DIAGNOSIS — Z13.29 THYROID DISORDER SCREEN: ICD-10-CM

## 2025-07-08 DIAGNOSIS — Z13.220 LIPID SCREENING: ICD-10-CM

## 2025-07-08 DIAGNOSIS — E66.3 OVERWEIGHT (BMI 25.0-29.9): ICD-10-CM

## 2025-07-08 PROCEDURE — 99396 PREV VISIT EST AGE 40-64: CPT | Performed by: NURSE PRACTITIONER

## 2025-07-08 RX ORDER — SEMAGLUTIDE 0.5 MG/.5ML
0.5 INJECTION, SOLUTION SUBCUTANEOUS WEEKLY
Qty: 2 ML | Refills: 1 | Status: SHIPPED | OUTPATIENT
Start: 2025-07-08

## 2025-07-08 NOTE — PROGRESS NOTES
Chief Complaint  Obesity, Annual exam     SUBJECTIVE  Angelique Cordon presents to [facility]    Patient presents today for annual exam, follow-up on weight and lipedema.     Obesity:   Patient has restarted Wegovy.  Patient states she had stopped Wegovy a few weeks because she was scheduled for Lymphedema surgery, but that was cancelled due to insurance denial.  Patient has lost 2 lbs since 6/12/25, with a current BMI of 24.96. restarted the med about 1 month ago at starting dose, ready to increase     Pt states is tolerating the medication well, ready to increase dose.      History of Present Illness  Past Medical History:   Diagnosis Date    Abnormal bone density screening     Anxiety     History of transfusion     X2 following previous leg surgeries    Lipedema     Lipedema of lower extremity     LEGS AND ARMS    Localized edema     Pap smear for cervical cancer screening 2018    PONV (postoperative nausea and vomiting)     Skin irritation     noted under nasal area, currently using  ointment prescribed by pcp    Varicose vein of leg     Visit for screening mammogram       Family History   Problem Relation Age of Onset    Diabetes Mother     Sleep apnea Mother     COPD Mother     Hypertension Mother     Colon cancer Father     Malig Hyperthermia Neg Hx       Past Surgical History:   Procedure Laterality Date    EXCISION LESION Left 4/1/2022    Procedure: EXCISION CYST LEFT AXILLARY;  Surgeon: Rosalie Talley MD;  Location: [facility] OR [facility] [place];  Service: General;  Laterality: Left;    FOOT SURGERY Left 2003/2009 HAD 3 DIFFERENT SURGERIES    L FOOT/TENDON RELEASE PLANTAR FASIA    LIPOSUCTION Right 10/24/2023    Procedure: LIPOSUCTION, Right Lower Extremity Circumferential Liposuction with Skin Resection and negative pressure dressing;  Surgeon: Raffaele Willams MD;  Location: [facility] OR [facility] [place];  Service: Plastics;  Laterality: Right;    LIPOSUCTION Left 4/19/2024    Procedure:  "LIPOSUCTION, Left Lower Extremity Circumferential Liposuction with Skin Resection and negative pressure dressing;  Surgeon: Raffaele Willams MD;  Location: [facility] [place];  Service: Plastics;  Laterality: Left;    LIPOSUCTION Bilateral 10/16/2024    Procedure: LIPOSUCTION BACK, hip liposuction with skin excision and closure;  Surgeon: Raffaele Willams MD;  Location: [facility] MAIN OR;  Service: Plastics;  Laterality: Bilateral;        Current Outpatient Medications:     ibuprofen (ADVIL,MOTRIN) 800 MG tablet, Take 1 tablet by mouth Every 8 (Eight) Hours As Needed for Mild Pain., Disp: 30 tablet, Rfl: 0    melatonin 5 MG tablet tablet, Take 1 tablet by mouth Every Night., Disp: , Rfl:     Vortioxetine HBr (Trintellix) 20 MG tablet, Take 1 tablet by mouth Daily., Disp: 90 tablet, Rfl: 1    Semaglutide-Weight Management (Wegovy) 0.5 MG/0.5ML solution auto-injector, Inject 0.5 mL under the skin into the appropriate area as directed 1 (One) Time Per Week., Disp: 2 mL, Rfl: 1    OBJECTIVE  Vital Signs:   /64 (BP Location: Left arm, Patient Position: Sitting, Cuff Size: Large Adult)   Pulse 96   Temp 98 °F (36.7 °C) (Temporal)   Ht 152.4 cm (60\")   Wt 58 kg (127 lb 12.8 oz)   SpO2 98%   BMI 24.96 kg/m²    Estimated body mass index is 24.96 kg/m² as calculated from the following:    Height as of this encounter: 152.4 cm (60\").    Weight as of this encounter: 58 kg (127 lb 12.8 oz).     Wt Readings from Last 3 Encounters:   07/08/25 58 kg (127 lb 12.8 oz)   06/12/25 58.5 kg (129 lb)   05/08/25 59.4 kg (131 lb)     BP Readings from Last 3 Encounters:   07/08/25 102/64   06/12/25 126/85   05/08/25 122/85       Physical Exam  Vitals reviewed.   Constitutional:       General: She is not in acute distress.     Appearance: Normal appearance. She is well-developed. She is not diaphoretic.   HENT:      Head: Normocephalic and atraumatic. Hair is normal.      Right Ear: Hearing, tympanic membrane, ear canal and " external ear normal. No decreased hearing noted. No drainage.      Left Ear: Hearing, tympanic membrane, ear canal and external ear normal. No decreased hearing noted.      Nose: Nose normal. No nasal deformity.      Mouth/Throat:      Mouth: Mucous membranes are moist.   Eyes:      General: Lids are normal.         Right eye: No discharge.         Left eye: No discharge.      Extraocular Movements: Extraocular movements intact.      Conjunctiva/sclera: Conjunctivae normal.      Pupils: Pupils are equal, round, and reactive to light.   Neck:      Thyroid: No thyromegaly.      Vascular: No JVD.   Cardiovascular:      Rate and Rhythm: Normal rate and regular rhythm.      Pulses: Normal pulses.      Heart sounds: Normal heart sounds. No murmur heard.     No friction rub. No gallop.   Pulmonary:      Effort: Pulmonary effort is normal. No respiratory distress.      Breath sounds: Normal breath sounds. No wheezing, rhonchi or rales.   Chest:      Chest wall: No tenderness.   Abdominal:      General: Bowel sounds are normal. There is no distension.      Palpations: Abdomen is soft. There is no mass.      Tenderness: There is no abdominal tenderness. There is no guarding or rebound.      Hernia: No hernia is present.   Musculoskeletal:         General: No tenderness or deformity. Normal range of motion.      Cervical back: Normal range of motion and neck supple.   Lymphadenopathy:      Cervical: No cervical adenopathy.   Skin:     General: Skin is warm and dry.      Findings: No erythema or rash.   Neurological:      Mental Status: She is alert and oriented to person, place, and time.      Cranial Nerves: No cranial nerve deficit.      Motor: No abnormal muscle tone.      Coordination: Coordination normal.      Deep Tendon Reflexes: Reflexes are normal and symmetric. Reflexes normal.   Psychiatric:         Mood and Affect: Mood and affect normal.         Behavior: Behavior normal.         Thought Content: Thought content  normal.         Judgment: Judgment normal.        Result Review    CMP          10/17/2024    04:37   CMP   Glucose 139    BUN 9    Creatinine 0.69    EGFR 109.9    Sodium 138    Potassium 4.4    Chloride 108    Calcium 8.1    BUN/Creatinine Ratio 13.0    Anion Gap 5.5      CBC          10/17/2024    04:37 11/18/2024    15:49   CBC   WBC 9.36  5.81    RBC 2.59  3.66    Hemoglobin 8.1  11.4    Hematocrit 24.8  34.7    MCV 95.8  94.8    MCH 31.3  31.1    MCHC 32.7  32.9    RDW 13.5  13.0    Platelets 202  320      CBC w/diff          10/17/2024    04:37 11/18/2024    15:49   CBC w/Diff   WBC 9.36  5.81    RBC 2.59  3.66    Hemoglobin 8.1  11.4    Hematocrit 24.8  34.7    MCV 95.8  94.8    MCH 31.3  31.1    MCHC 32.7  32.9    RDW 13.5  13.0    Platelets 202  320    Neutrophil Rel % 75.4  59.4    Immature Granulocyte Rel % 0.4  0.3    Lymphocyte Rel % 15.7  30.3    Monocyte Rel % 8.3  4.8    Eosinophil Rel % 0.1  4.5    Basophil Rel % 0.1  0.7              No Images in the past 120 days found..      The above data has been reviewed by ANDREI Olivera 07/08/2025 08:00 EDT.          Patient Care Team:  Hannah Parrish APRN as PCP - General (Nurse Practitioner)  Rosalie Talley MD as Consulting Physician (General Surgery)  Raffaele Willams MD as Consulting Physician (Plastic Surgery)    BMI is within normal parameters. No other follow-up for BMI required.       ASSESSMENT & PLAN    Diagnoses and all orders for this visit:    1. Annual physical exam (Primary)  -     Comprehensive Metabolic Panel; Future  -     CBC & Differential; Future  -     Lipid Panel; Future  -     TSH Rfx On Abnormal To Free T4; Future    2. Lipedema  Assessment & Plan:  Short-term discontinuation of Wegovy related to lipedema surgery, surgery ended up being canceled, now in the process of trying to get rescheduled and has resumed her Wegovy.  She will continue pending surgery and would then will resume again after  surgery    Orders:  -     Semaglutide-Weight Management (Wegovy) 0.5 MG/0.5ML solution auto-injector; Inject 0.5 mL under the skin into the appropriate area as directed 1 (One) Time Per Week.  Dispense: 2 mL; Refill: 1    3. Overweight (BMI 25.0-29.9)  Assessment & Plan:  Patient doing well with Wegovy, we will continue occasion, dose increased sent    Orders:  -     Semaglutide-Weight Management (Wegovy) 0.5 MG/0.5ML solution auto-injector; Inject 0.5 mL under the skin into the appropriate area as directed 1 (One) Time Per Week.  Dispense: 2 mL; Refill: 1    4. Lipid screening  -     Lipid Panel; Future    5. Thyroid disorder screen  -     TSH Rfx On Abnormal To Free T4; Future         Tobacco Use: Low Risk  (7/8/2025)    Patient History     Smoking Tobacco Use: Never     Smokeless Tobacco Use: Never     Passive Exposure: Never       The patient is advised to continue current medications, continue current healthy lifestyle patterns, and return for routine annual checkups.      Follow Up     Return in about 6 months (around 1/8/2026), or if symptoms worsen or fail to improve.      Patient was given instructions and counseling regarding her condition or for health maintenance advice. Please see specific information pulled into the AVS if appropriate.   I have reviewed information obtained and documented by others and I have confirmed the accuracy of this documented note.    ANDREI Olivera

## 2025-07-08 NOTE — ASSESSMENT & PLAN NOTE
Short-term discontinuation of Wegovy related to lipedema surgery, surgery ended up being canceled, now in the process of trying to get rescheduled and has resumed her Wegovy.  She will continue pending surgery and would then will resume again after surgery

## 2025-07-28 RX ORDER — SEMAGLUTIDE 1 MG/.5ML
1 INJECTION, SOLUTION SUBCUTANEOUS WEEKLY
Qty: 2 ML | Refills: 2 | Status: SHIPPED | OUTPATIENT
Start: 2025-07-28

## 2025-08-25 RX ORDER — SEMAGLUTIDE 1 MG/.5ML
1 INJECTION, SOLUTION SUBCUTANEOUS WEEKLY
Qty: 2 ML | Refills: 2 | Status: SHIPPED | OUTPATIENT
Start: 2025-08-25

## 2025-08-28 ENCOUNTER — LAB (OUTPATIENT)
Dept: LAB | Facility: HOSPITAL | Age: 45
End: 2025-08-28
Payer: OTHER GOVERNMENT

## 2025-08-28 DIAGNOSIS — Z13.29 THYROID DISORDER SCREEN: ICD-10-CM

## 2025-08-28 DIAGNOSIS — E55.9 VITAMIN D DEFICIENCY: ICD-10-CM

## 2025-08-28 DIAGNOSIS — Z13.220 LIPID SCREENING: ICD-10-CM

## 2025-08-28 DIAGNOSIS — Z00.00 ANNUAL PHYSICAL EXAM: ICD-10-CM

## 2025-08-28 DIAGNOSIS — E83.51 HYPOCALCEMIA: ICD-10-CM

## 2025-08-28 DIAGNOSIS — D64.9 ANEMIA, UNSPECIFIED TYPE: ICD-10-CM

## 2025-08-28 LAB
25(OH)D3 SERPL-MCNC: 37.1 NG/ML (ref 30–100)
ALBUMIN SERPL-MCNC: 4.5 G/DL (ref 3.5–5.2)
ALBUMIN/GLOB SERPL: 1.7 G/DL
ALP SERPL-CCNC: 33 U/L (ref 39–117)
ALT SERPL W P-5'-P-CCNC: 14 U/L (ref 1–33)
ANION GAP SERPL CALCULATED.3IONS-SCNC: 13.2 MMOL/L (ref 5–15)
AST SERPL-CCNC: 11 U/L (ref 1–32)
BASOPHILS # BLD MANUAL: 0.04 10*3/MM3 (ref 0–0.2)
BASOPHILS NFR BLD MANUAL: 1 % (ref 0–1.5)
BILIRUB SERPL-MCNC: 0.3 MG/DL (ref 0–1.2)
BUN SERPL-MCNC: 13 MG/DL (ref 6–20)
BUN/CREAT SERPL: 15.9 (ref 7–25)
CALCIUM SPEC-SCNC: 10.1 MG/DL (ref 8.6–10.5)
CHLORIDE SERPL-SCNC: 105 MMOL/L (ref 98–107)
CHOLEST SERPL-MCNC: 222 MG/DL (ref 0–200)
CO2 SERPL-SCNC: 21.8 MMOL/L (ref 22–29)
CREAT SERPL-MCNC: 0.82 MG/DL (ref 0.57–1)
DEPRECATED RDW RBC AUTO: 43.2 FL (ref 37–54)
EGFRCR SERPLBLD CKD-EPI 2021: 90 ML/MIN/1.73
EOSINOPHIL # BLD MANUAL: 0 10*3/MM3 (ref 0–0.4)
EOSINOPHIL NFR BLD MANUAL: 0 % (ref 0.3–6.2)
ERYTHROCYTE [DISTWIDTH] IN BLOOD BY AUTOMATED COUNT: 12.5 % (ref 12.3–15.4)
GLOBULIN UR ELPH-MCNC: 2.6 GM/DL
GLUCOSE SERPL-MCNC: 90 MG/DL (ref 65–99)
HCT VFR BLD AUTO: 40.6 % (ref 34–46.6)
HDLC SERPL-MCNC: 49 MG/DL (ref 40–60)
HGB BLD-MCNC: 13.2 G/DL (ref 12–15.9)
LDLC SERPL CALC-MCNC: 161 MG/DL (ref 0–100)
LDLC/HDLC SERPL: 3.25 {RATIO}
LYMPHOCYTES # BLD MANUAL: 1.38 10*3/MM3 (ref 0.7–3.1)
LYMPHOCYTES NFR BLD MANUAL: 7.1 % (ref 5–12)
MCH RBC QN AUTO: 30.8 PG (ref 26.6–33)
MCHC RBC AUTO-ENTMCNC: 32.5 G/DL (ref 31.5–35.7)
MCV RBC AUTO: 94.9 FL (ref 79–97)
MONOCYTES # BLD: 0.27 10*3/MM3 (ref 0.1–0.9)
NEUTROPHILS # BLD AUTO: 2.07 10*3/MM3 (ref 1.7–7)
NEUTROPHILS NFR BLD MANUAL: 55.1 % (ref 42.7–76)
PLAT MORPH BLD: NORMAL
PLATELET # BLD AUTO: 191 10*3/MM3 (ref 140–450)
PMV BLD AUTO: 13.8 FL (ref 6–12)
POIKILOCYTOSIS BLD QL SMEAR: ABNORMAL
POTASSIUM SERPL-SCNC: 4 MMOL/L (ref 3.5–5.2)
PROT SERPL-MCNC: 7.1 G/DL (ref 6–8.5)
RBC # BLD AUTO: 4.28 10*6/MM3 (ref 3.77–5.28)
SODIUM SERPL-SCNC: 140 MMOL/L (ref 136–145)
TRIGL SERPL-MCNC: 69 MG/DL (ref 0–150)
TSH SERPL DL<=0.05 MIU/L-ACNC: 2.03 UIU/ML (ref 0.27–4.2)
VARIANT LYMPHS NFR BLD MANUAL: 36.7 % (ref 19.6–45.3)
VLDLC SERPL-MCNC: 12 MG/DL (ref 5–40)
WBC MORPH BLD: NORMAL
WBC NRBC COR # BLD AUTO: 3.75 10*3/MM3 (ref 3.4–10.8)

## 2025-08-28 PROCEDURE — 84443 ASSAY THYROID STIM HORMONE: CPT

## 2025-08-28 PROCEDURE — 85007 BL SMEAR W/DIFF WBC COUNT: CPT

## 2025-08-28 PROCEDURE — 85025 COMPLETE CBC W/AUTO DIFF WBC: CPT

## 2025-08-28 PROCEDURE — 82306 VITAMIN D 25 HYDROXY: CPT

## 2025-08-28 PROCEDURE — 36415 COLL VENOUS BLD VENIPUNCTURE: CPT

## 2025-08-28 PROCEDURE — 80061 LIPID PANEL: CPT

## 2025-08-28 PROCEDURE — 80053 COMPREHEN METABOLIC PANEL: CPT

## (undated) DEVICE — NON-WOVEN ADHESIVE WOUND DRESSING: Brand: PRIMAPORE ADHESIVE WOUND DRSG 7.2*5CM

## (undated) DEVICE — STERILE POLYISOPRENE POWDER-FREE SURGICAL GLOVES WITH EMOLLIENT COATING: Brand: PROTEXIS

## (undated) DEVICE — PENCL E/S SMOKEEVAC W/TELESCP CANN

## (undated) DEVICE — KT CANSTR VAC WND W/ISOLYSER SENSATRAC 500CC 5CS

## (undated) DEVICE — INTENDED FOR TISSUE SEPARATION, AND OTHER PROCEDURES THAT REQUIRE A SHARP SURGICAL BLADE TO PUNCTURE OR CUT.: Brand: BARD-PARKER ® CARBON RIB-BACK BLADES

## (undated) DEVICE — GLV SURG SENSICARE SLT PF LF 6.5 STRL

## (undated) DEVICE — 3M™ STERI-STRIP™ REINFORCED ADHESIVE SKIN CLOSURES, R1547, 1/2 IN X 4 IN (12 MM X 100 MM), 6 STRIPS/ENVELOPE: Brand: 3M™ STERI-STRIP™

## (undated) DEVICE — SUT MNCRYL 4/0 PS2 18 IN

## (undated) DEVICE — GLV SURG SENSICARE PI PF LF 7 GRN STRL

## (undated) DEVICE — ELECTRD BLD EDGE COAT 3IN

## (undated) DEVICE — GOWN,REINFORCE,POLY,SIRUS,BREATH SLV,XLG: Brand: MEDLINE

## (undated) DEVICE — PLASTIC MAJOR-LF: Brand: MEDLINE INDUSTRIES, INC.

## (undated) DEVICE — PACK,UNIVERSAL,NO GOWNS: Brand: MEDLINE

## (undated) DEVICE — GLOVE,SURG,SENSICARE SLT,LF,PF,5.5: Brand: MEDLINE

## (undated) DEVICE — Device: Brand: MICROAIRE®

## (undated) DEVICE — ANTIBACTERIAL UNDYED BRAIDED (POLYGLACTIN 910), SYNTHETIC ABSORBABLE SUTURE: Brand: COATED VICRYL

## (undated) DEVICE — CVR HNDL LT SURG ACCSSRY BLU STRL

## (undated) DEVICE — PROXIMATE RH ROTATING HEAD SKIN STAPLERS (35 WIDE) CONTAINS 35 STAINLESS STEEL STAPLES: Brand: PROXIMATE

## (undated) DEVICE — STPLR SKIN SUBCUTICULAR INSORB 2030

## (undated) DEVICE — BANDAGE,GAUZE,BULKEE II,4.5"X4.1YD,STRL: Brand: MEDLINE

## (undated) DEVICE — ADHS LIQ MASTISOL 2/3ML

## (undated) DEVICE — MAJOR-LF: Brand: MEDLINE INDUSTRIES, INC.

## (undated) DEVICE — SLV SCD KN/LEN ADJ EXPRSS BLENDED MD 1P/U

## (undated) DEVICE — NON-WOVEN ADHESIVE WOUND DRESSING: Brand: PRIMAPORE ADHESIVE DRESSING 10*8CM

## (undated) DEVICE — 3M™ IOBAN™ 2 ANTIMICROBIAL INCISE DRAPE 6650EZ: Brand: IOBAN™ 2

## (undated) DEVICE — ANTIBACTERIAL VIOLET BRAIDED (POLYGLACTIN 910), SYNTHETIC ABSORBABLE SUTURE: Brand: COATED VICRYL

## (undated) DEVICE — DRSNG WND VAC GRANUFOAM SVR SENSATRAC SM

## (undated) DEVICE — ASPIRATION TUBING SET, DISPOSABLE: Brand: MICROAIRE®

## (undated) DEVICE — GLOVE,SURG,SENSICARE SLT,LF,PF,6.5: Brand: MEDLINE

## (undated) DEVICE — GLV SURG SENSICARE SLT PF LF 5.5 STRL

## (undated) DEVICE — PROXIMATE RH ROTATING HEAD SKIN STAPLERS (35 REGULAR) CONTAINS 35 STAINLESS STEEL STAPLES: Brand: PROXIMATE